# Patient Record
Sex: FEMALE | Race: WHITE | NOT HISPANIC OR LATINO | Employment: UNEMPLOYED | URBAN - NONMETROPOLITAN AREA
[De-identification: names, ages, dates, MRNs, and addresses within clinical notes are randomized per-mention and may not be internally consistent; named-entity substitution may affect disease eponyms.]

---
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
## 2020-01-27 ENCOUNTER — OFFICE VISIT (OUTPATIENT)
Dept: URGENT CARE | Facility: PHYSICIAN GROUP | Age: 20
End: 2020-01-27
Payer: MEDICAID

## 2020-01-27 VITALS
OXYGEN SATURATION: 98 % | BODY MASS INDEX: 21.69 KG/M2 | WEIGHT: 135 LBS | TEMPERATURE: 97.9 F | HEIGHT: 66 IN | RESPIRATION RATE: 16 BRPM | HEART RATE: 92 BPM | DIASTOLIC BLOOD PRESSURE: 80 MMHG | SYSTOLIC BLOOD PRESSURE: 118 MMHG

## 2020-01-27 DIAGNOSIS — J00 ACUTE NASOPHARYNGITIS: Primary | ICD-10-CM

## 2020-01-27 DIAGNOSIS — J02.9 SORE THROAT: ICD-10-CM

## 2020-01-27 LAB
INT CON NEG: NEGATIVE
INT CON POS: POSITIVE
S PYO AG THROAT QL: NEGATIVE

## 2020-01-27 PROCEDURE — 99214 OFFICE O/P EST MOD 30 MIN: CPT | Performed by: PHYSICIAN ASSISTANT

## 2020-01-27 PROCEDURE — 87880 STREP A ASSAY W/OPTIC: CPT | Performed by: PHYSICIAN ASSISTANT

## 2020-01-27 RX ORDER — SUMATRIPTAN 50 MG/1
TABLET, FILM COATED ORAL
COMMUNITY
Start: 2019-11-12 | End: 2020-11-28

## 2020-01-27 RX ORDER — FLUOXETINE HYDROCHLORIDE 20 MG/1
CAPSULE ORAL
COMMUNITY
Start: 2020-01-07 | End: 2020-08-31

## 2020-01-27 NOTE — PATIENT INSTRUCTIONS
"Upper Respiratory Infection, Adult  Most upper respiratory infections (URIs) are a viral infection of the air passages leading to the lungs. A URI affects the nose, throat, and upper air passages. The most common type of URI is nasopharyngitis and is typically referred to as \"the common cold.\"  URIs run their course and usually go away on their own. Most of the time, a URI does not require medical attention, but sometimes a bacterial infection in the upper airways can follow a viral infection. This is called a secondary infection. Sinus and middle ear infections are common types of secondary upper respiratory infections.  Bacterial pneumonia can also complicate a URI. A URI can worsen asthma and chronic obstructive pulmonary disease (COPD). Sometimes, these complications can require emergency medical care and may be life threatening.  What are the causes?  Almost all URIs are caused by viruses. A virus is a type of germ and can spread from one person to another.  What increases the risk?  You may be at risk for a URI if:  · You smoke.  · You have chronic heart or lung disease.  · You have a weakened defense (immune) system.  · You are very young or very old.  · You have nasal allergies or asthma.  · You work in crowded or poorly ventilated areas.  · You work in health care facilities or schools.  What are the signs or symptoms?  Symptoms typically develop 2-3 days after you come in contact with a cold virus. Most viral URIs last 7-10 days. However, viral URIs from the influenza virus (flu virus) can last 14-18 days and are typically more severe. Symptoms may include:  · Runny or stuffy (congested) nose.  · Sneezing.  · Cough.  · Sore throat.  · Headache.  · Fatigue.  · Fever.  · Loss of appetite.  · Pain in your forehead, behind your eyes, and over your cheekbones (sinus pain).  · Muscle aches.  How is this diagnosed?  Your health care provider may diagnose a URI by:  · Physical exam.  · Tests to check that your " symptoms are not due to another condition such as:  ¨ Strep throat.  ¨ Sinusitis.  ¨ Pneumonia.  ¨ Asthma.  How is this treated?  A URI goes away on its own with time. It cannot be cured with medicines, but medicines may be prescribed or recommended to relieve symptoms. Medicines may help:  · Reduce your fever.  · Reduce your cough.  · Relieve nasal congestion.  Follow these instructions at home:  · Take medicines only as directed by your health care provider.  · Gargle warm saltwater or take cough drops to comfort your throat as directed by your health care provider.  · Use a warm mist humidifier or inhale steam from a shower to increase air moisture. This may make it easier to breathe.  · Drink enough fluid to keep your urine clear or pale yellow.  · Eat soups and other clear broths and maintain good nutrition.  · Rest as needed.  · Return to work when your temperature has returned to normal or as your health care provider advises. You may need to stay home longer to avoid infecting others. You can also use a face mask and careful hand washing to prevent spread of the virus.  · Increase the usage of your inhaler if you have asthma.  · Do not use any tobacco products, including cigarettes, chewing tobacco, or electronic cigarettes. If you need help quitting, ask your health care provider.  How is this prevented?  The best way to protect yourself from getting a cold is to practice good hygiene.  · Avoid oral or hand contact with people with cold symptoms.  · Wash your hands often if contact occurs.  There is no clear evidence that vitamin C, vitamin E, echinacea, or exercise reduces the chance of developing a cold. However, it is always recommended to get plenty of rest, exercise, and practice good nutrition.  Contact a health care provider if:  · You are getting worse rather than better.  · Your symptoms are not controlled by medicine.  · You have chills.  · You have worsening shortness of breath.  · You have brown  or red mucus.  · You have yellow or brown nasal discharge.  · You have pain in your face, especially when you bend forward.  · You have a fever.  · You have swollen neck glands.  · You have pain while swallowing.  · You have white areas in the back of your throat.  Get help right away if:  · You have severe or persistent:  ¨ Headache.  ¨ Ear pain.  ¨ Sinus pain.  ¨ Chest pain.  · You have chronic lung disease and any of the following:  ¨ Wheezing.  ¨ Prolonged cough.  ¨ Coughing up blood.  ¨ A change in your usual mucus.  · You have a stiff neck.  · You have changes in your:  ¨ Vision.  ¨ Hearing.  ¨ Thinking.  ¨ Mood.  This information is not intended to replace advice given to you by your health care provider. Make sure you discuss any questions you have with your health care provider.  Document Released: 06/13/2002 Document Revised: 08/20/2017 Document Reviewed: 03/25/2015  [employer] Interactive Patient Education © 2017 [employer]

## 2020-01-27 NOTE — PROGRESS NOTES
Chief Complaint   Patient presents with   • Pharyngitis       HISTORY OF PRESENT ILLNESS: Patient is a 19 y.o. female who presents today because she has a 2-day history of sore throat, nasal congestion, bilateral ear pain.  She has not been taking any medications for her current symptoms.  Denies any fevers, chills, nausea, vomiting or diarrhea.    Patient Active Problem List    Diagnosis Date Noted   • Hospital discharge follow-up 10/01/2019   • Drug-induced sexual dysfunction (HCC) 04/29/2019   • Pain of right scapula 04/29/2019   • Myalgia of muscle of neck 01/17/2019   • Cyst of right ovary 06/27/2018   • Eczema 05/29/2018   • Mild intermittent asthma without complication 12/04/2017   • Chronic right-sided low back pain without sciatica 05/15/2017   • Mild episode of recurrent major depressive disorder (HCC) 12/30/2016       Allergies:Kiwi extract    Current Outpatient Medications Ordered in Epic   Medication Sig Dispense Refill   • SUMAtriptan (IMITREX) 50 MG Tab      • FLUoxetine (PROZAC) 20 MG Cap      • albuterol (PROAIR HFA) 108 (90 Base) MCG/ACT Aero Soln inhalation aerosol INHALE TWO PUFFS BY MOUTH EVERY 6 HOURS AS NEEDED FOR SHORTNESS OF BREATH 1 Inhaler 2   • levonorgestrel (MIRENA, 52 MG,) 20 MCG/24HR IUD 1 Each by Intrauterine route Once.       No current Epic-ordered facility-administered medications on file.        Past Medical History:   Diagnosis Date   • Asthma    • Migraine    • Shoulder pain 8/8/2014   • Urinary tract infection        Social History     Tobacco Use   • Smoking status: Current Some Day Smoker     Packs/day: 0.00     Types: Cigars   • Smokeless tobacco: Never Used   Substance Use Topics   • Alcohol use: No     Alcohol/week: 0.0 oz   • Drug use: No       Family Status   Relation Name Status   • Mo  Alive   • Fa  Alive   • MGMo  Alive   • MGFa  Alive   • Child  Alive     Family History   Problem Relation Age of Onset   • Anxiety disorder Mother    • Heart Disease Maternal  "Grandfather        ROS:  Review of Systems   Constitutional: Negative for fever, chills, weight loss and malaise/fatigue.   HENT: Positive for bilateral ear pain, nosebleeds, positive for congestion, sore throat and no neck pain.    Eyes: Negative for blurred vision.   Respiratory: Positive for mild cough, no sputum production, shortness of breath and wheezing.    Cardiovascular: Negative for chest pain, palpitations, orthopnea and leg swelling.   Gastrointestinal: Negative for heartburn, nausea, vomiting and abdominal pain.   Genitourinary: Negative for dysuria, urgency and frequency.     Exam:  /80 (BP Location: Left arm, Patient Position: Sitting, BP Cuff Size: Adult)   Pulse 92   Temp 36.6 °C (97.9 °F) (Temporal)   Resp 16   Ht 1.664 m (5' 5.5\")   Wt 61.2 kg (135 lb)   SpO2 98%   General:  Well nourished, well developed female in NAD  Head:Normocephalic, atraumatic  Eyes: PERRLA, EOM within normal limits, no conjunctival injection, no scleral icterus, visual fields and acuity grossly intact.  Ears: Normal shape and symmetry, no tenderness, no discharge. External canals are without any significant edema or erythema. Tympanic membranes are without any inflammation, no effusion. Gross auditory acuity is intact  Nose: Symmetrical without tenderness, no discharge.  Mouth: reasonable hygiene, she has mild pharyngeal arch erythema without exudates or tonsillar enlargement.  Neck: no masses, range of motion within normal limits, no tracheal deviation. No obvious thyroid enlargement.  Pulmonary: chest is symmetrical with respiration, no wheezes, crackles, or rhonchi.  Cardiovascular: regular rate and rhythm without murmurs, rubs, or gallops.  Extremities: no clubbing, cyanosis, or edema.    Strep test is negative    Please note that this dictation was created using voice recognition software. I have made every reasonable attempt to correct obvious errors, but I expect that there are errors of grammar and " possibly content that I did not discover before finalizing the note.    Assessment/Plan:  1. Acute nasopharyngitis     2. Sore throat  POCT Rapid Strep A   Over-the-counter DayQuil and NyQuil as tolerated.    Followup with primary care in the next 7-10 days if not significantly improving, return to the urgent care or go to the emergency room sooner for any worsening of symptoms.

## 2020-02-19 ENCOUNTER — OFFICE VISIT (OUTPATIENT)
Dept: URGENT CARE | Facility: PHYSICIAN GROUP | Age: 20
End: 2020-02-19
Payer: MEDICAID

## 2020-02-19 VITALS
TEMPERATURE: 98.3 F | BODY MASS INDEX: 21.69 KG/M2 | SYSTOLIC BLOOD PRESSURE: 102 MMHG | WEIGHT: 135 LBS | HEART RATE: 64 BPM | DIASTOLIC BLOOD PRESSURE: 58 MMHG | RESPIRATION RATE: 16 BRPM | OXYGEN SATURATION: 96 % | HEIGHT: 66 IN

## 2020-02-19 DIAGNOSIS — J06.9 UPPER RESPIRATORY TRACT INFECTION, UNSPECIFIED TYPE: ICD-10-CM

## 2020-02-19 PROCEDURE — 99214 OFFICE O/P EST MOD 30 MIN: CPT | Performed by: PHYSICIAN ASSISTANT

## 2020-02-19 RX ORDER — BENZONATATE 200 MG/1
200 CAPSULE ORAL 3 TIMES DAILY PRN
Qty: 30 CAP | Refills: 0 | Status: SHIPPED | OUTPATIENT
Start: 2020-02-19 | End: 2020-08-31

## 2020-02-19 NOTE — PROGRESS NOTES
Chief Complaint   Patient presents with   • Cough     Sore Throat, Cough, Fatigue.        HISTORY OF PRESENT ILLNESS: Patient is a 19 y.o. female who presents today for the following:    Cough x 3-4 days  + ST, tired, mild ear pain off and on  Denies nasal congestion  OTC meds tried: cough drop  Denies fever, chills    Patient Active Problem List    Diagnosis Date Noted   • Hospital discharge follow-up 10/01/2019   • Drug-induced sexual dysfunction (HCC) 04/29/2019   • Pain of right scapula 04/29/2019   • Myalgia of muscle of neck 01/17/2019   • Cyst of right ovary 06/27/2018   • Eczema 05/29/2018   • Mild intermittent asthma without complication 12/04/2017   • Chronic right-sided low back pain without sciatica 05/15/2017   • Mild episode of recurrent major depressive disorder (HCC) 12/30/2016       Allergies:Kiwi extract    Current Outpatient Medications Ordered in Epic   Medication Sig Dispense Refill   • benzonatate (TESSALON) 200 MG capsule Take 1 Cap by mouth 3 times a day as needed for Cough. 30 Cap 0   • SUMAtriptan (IMITREX) 50 MG Tab      • FLUoxetine (PROZAC) 20 MG Cap      • albuterol (PROAIR HFA) 108 (90 Base) MCG/ACT Aero Soln inhalation aerosol INHALE TWO PUFFS BY MOUTH EVERY 6 HOURS AS NEEDED FOR SHORTNESS OF BREATH 1 Inhaler 2   • levonorgestrel (MIRENA, 52 MG,) 20 MCG/24HR IUD 1 Each by Intrauterine route Once.       No current Epic-ordered facility-administered medications on file.        Past Medical History:   Diagnosis Date   • Asthma    • Migraine    • Shoulder pain 8/8/2014   • Urinary tract infection        Social History     Tobacco Use   • Smoking status: Current Some Day Smoker     Packs/day: 0.00     Types: Cigars   • Smokeless tobacco: Never Used   Substance Use Topics   • Alcohol use: No     Alcohol/week: 0.0 oz   • Drug use: No       Family Status   Relation Name Status   • Mo  Alive   • Fa  Alive   • MGMo  Alive   • MGFa  Alive   • Child  Alive     Family History   Problem Relation  "Age of Onset   • Anxiety disorder Mother    • Heart Disease Maternal Grandfather        Review of Systems:   Constitutional ROS: No unexpected change in weight, No weakness, No fatigue  Eye ROS: No recent significant change in vision, No eye pain, redness, discharge  Ear ROS: No drainage, No tinnitus or vertigo, No recent change in hearing  Mouth/Throat ROS: No teeth or gum problems, No bleeding gums, No tongue complaints  Neck ROS: No swollen glands, No significant pain in neck  Pulmonary ROS: No chronic cough, sputum, or hemoptysis, No dyspnea on exertion, No wheezing  Cardiovascular ROS: No diaphoresis, No edema, No palpitations  Musculoskeletal/Extremities ROS: No peripheral edema, No pain, redness or swelling on the joints  Hematologic/Lymphatic ROS: No chills, No night sweats, No weight loss  Skin/Integumentary ROS: No edema, No evidence of rash, No itching      Exam:  /58   Pulse 64   Temp 36.8 °C (98.3 °F) (Temporal)   Resp 16   Ht 1.676 m (5' 6\")   Wt 61.2 kg (135 lb)   SpO2 96%   General: Well developed, well nourished. No distress.    Eye: PERRL/EOMI; conjunctivae clear, lids normal.  ENMT: Lips without lesions, MMM. Oropharynx is clear. Bilateral TMs are within normal limits.  Pulmonary: Unlabored respiratory effort. Lungs clear to auscultation, no wheezes, no rhonchi.    Cardiovascular: Regular rate and rhythm without murmur.   Neurologic: Grossly nonfocal. No facial asymmetry noted.  Lymph: No cervical lymphadenopathy noted.  Skin: Warm, dry, good turgor. No rashes in visible areas.   Psych: Normal mood. Alert and oriented to person, place and time.    Assessment/Plan:  Discussed likely viral etiology.  Vitals and exam unremarkable.  Low suspicion for pneumonia.  Discussed appropriate over-the-counter symptomatic medication, and when to return to clinic. Follow up for worsening or persistent symptoms.  1. Upper respiratory tract infection, unspecified type  benzonatate (TESSALON) 200 MG " capsule

## 2020-08-31 ENCOUNTER — OFFICE VISIT (OUTPATIENT)
Dept: URGENT CARE | Facility: PHYSICIAN GROUP | Age: 20
End: 2020-08-31
Payer: MEDICAID

## 2020-08-31 VITALS
TEMPERATURE: 98 F | WEIGHT: 137 LBS | RESPIRATION RATE: 16 BRPM | DIASTOLIC BLOOD PRESSURE: 58 MMHG | HEART RATE: 78 BPM | OXYGEN SATURATION: 97 % | SYSTOLIC BLOOD PRESSURE: 98 MMHG | BODY MASS INDEX: 22.02 KG/M2 | HEIGHT: 66 IN

## 2020-08-31 DIAGNOSIS — R42 DIZZINESS: ICD-10-CM

## 2020-08-31 LAB
APPEARANCE UR: CLEAR
BILIRUB UR STRIP-MCNC: NORMAL MG/DL
COLOR UR AUTO: YELLOW
GLUCOSE BLD-MCNC: 96 MG/DL (ref 70–100)
GLUCOSE UR STRIP.AUTO-MCNC: NORMAL MG/DL
INT CON NEG: NORMAL
INT CON POS: NORMAL
KETONES UR STRIP.AUTO-MCNC: NORMAL MG/DL
LEUKOCYTE ESTERASE UR QL STRIP.AUTO: NORMAL
NITRITE UR QL STRIP.AUTO: NORMAL
PH UR STRIP.AUTO: 5.5 [PH] (ref 5–8)
POC URINE PREGNANCY TEST: NORMAL
PROT UR QL STRIP: NORMAL MG/DL
RBC UR QL AUTO: NORMAL
SP GR UR STRIP.AUTO: 1.02
UROBILINOGEN UR STRIP-MCNC: 0.2 MG/DL

## 2020-08-31 PROCEDURE — 99214 OFFICE O/P EST MOD 30 MIN: CPT | Mod: 25 | Performed by: PHYSICIAN ASSISTANT

## 2020-08-31 PROCEDURE — 81002 URINALYSIS NONAUTO W/O SCOPE: CPT | Performed by: PHYSICIAN ASSISTANT

## 2020-08-31 PROCEDURE — 81025 URINE PREGNANCY TEST: CPT | Performed by: PHYSICIAN ASSISTANT

## 2020-08-31 PROCEDURE — 82962 GLUCOSE BLOOD TEST: CPT | Performed by: PHYSICIAN ASSISTANT

## 2020-08-31 ASSESSMENT — FIBROSIS 4 INDEX: FIB4 SCORE: 0.34

## 2020-08-31 NOTE — PROGRESS NOTES
Chief Complaint   Patient presents with   • Dizziness     SOB/Dizziness/Heart Palpitations.        HISTORY OF PRESENT ILLNESS: Patient is a 20 y.o. female who presents today for the following:    Dizziness x 4 days  + SOB; h/o asthma; using inhaler almost daily  Denies fever, body aches, chills  Does not use a nebulizer at home  Mild nasal congestion  LMP: 3 years ago; IUD in place  Nausea with dizziness  OTC meds today: none  COVID contact: none  vapes nictotine  Denies EtOH, illicit drugs, marijuana    Patient Active Problem List    Diagnosis Date Noted   • Hospital discharge follow-up 10/01/2019   • Drug-induced sexual dysfunction (HCC) 04/29/2019   • Pain of right scapula 04/29/2019   • Myalgia of muscle of neck 01/17/2019   • Cyst of right ovary 06/27/2018   • Eczema 05/29/2018   • Mild intermittent asthma without complication 12/04/2017   • Chronic right-sided low back pain without sciatica 05/15/2017   • Mild episode of recurrent major depressive disorder (HCC) 12/30/2016       Allergies:Kiwi extract    Current Outpatient Medications Ordered in Epic   Medication Sig Dispense Refill   • SUMAtriptan (IMITREX) 50 MG Tab      • albuterol (PROAIR HFA) 108 (90 Base) MCG/ACT Aero Soln inhalation aerosol INHALE TWO PUFFS BY MOUTH EVERY 6 HOURS AS NEEDED FOR SHORTNESS OF BREATH 1 Inhaler 2   • levonorgestrel (MIRENA, 52 MG,) 20 MCG/24HR IUD 1 Each by Intrauterine route Once.       No current Epic-ordered facility-administered medications on file.        Past Medical History:   Diagnosis Date   • Asthma    • Migraine    • Shoulder pain 8/8/2014   • Urinary tract infection        Social History     Tobacco Use   • Smoking status: Current Some Day Smoker     Packs/day: 0.00     Types: Cigars   • Smokeless tobacco: Never Used   Substance Use Topics   • Alcohol use: No     Alcohol/week: 0.0 oz   • Drug use: No       Family Status   Relation Name Status   • Mo  Alive   • Fa  Alive   • MGMo  Alive   • MGFa  Alive   • Child   "Alive     Family History   Problem Relation Age of Onset   • Anxiety disorder Mother    • Heart Disease Maternal Grandfather        Review of Systems   Constitutional ROS: No unexpected change in weight, No weakness, No fatigue  Eye ROS: No recent significant change in vision, No eye pain, redness, discharge  Ear ROS: No drainage, No tinnitus or vertigo, No recent change in hearing  Mouth/Throat ROS: No teeth or gum problems, No bleeding gums, No tongue complaints  Neck ROS: No swollen glands, No significant pain in neck  Pulmonary ROS: No chronic cough, sputum, or hemoptysis, No dyspnea on exertion, No wheezing  Cardiovascular ROS: No diaphoresis, No edema, No palpitations  Musculoskeletal/Extremities ROS: No peripheral edema, No pain, redness or swelling on the joints  Hematologic/Lymphatic ROS: No chills, No night sweats, No weight loss  Skin/Integumentary ROS: No edema, No evidence of rash, No itching    Exam:  BP (!) 98/58   Pulse 78   Temp 36.7 °C (98 °F) (Temporal)   Resp 16   Ht 1.676 m (5' 6\")   Wt 62.1 kg (137 lb)   SpO2 97%   General: Well developed, well nourished. No distress.    Eye: PERRL/EOMI; conjunctivae clear, lids normal.  ENMT: Lips without lesions, MMM. Oropharynx is clear. Bilateral TMs are within normal limits.  Pulmonary: Unlabored respiratory effort. Lungs clear to auscultation, no wheezes, no rhonchi.    Cardiovascular: Regular rate and rhythm without murmur.   Neurologic: Grossly nonfocal. No facial asymmetry noted.  Lymph: No cervical lymphadenopathy noted.  Skin: Warm, dry, good turgor. No rashes in visible areas.   Psych: Normal mood. Alert and oriented to person, place and time.    Glucose: 96  UA: Trace blood  Urine hcg: negative    Assessment/Plan:  Discussed differential diagnosis with the patient including but not limited to viral etiology, low blood pressure, anemia, thyroid disease, among others.  Encourage patient increase her fluid intake.  Patient to have labs drawn and " follow up with her primary care provider for further evaluation management of persistent symptoms.   1. Dizziness  POCT Urinalysis    POCT PREGNANCY    POCT glucose

## 2020-09-14 ENCOUNTER — OFFICE VISIT (OUTPATIENT)
Dept: URGENT CARE | Facility: PHYSICIAN GROUP | Age: 20
End: 2020-09-14
Payer: MEDICAID

## 2020-09-14 ENCOUNTER — HOSPITAL ENCOUNTER (OUTPATIENT)
Facility: MEDICAL CENTER | Age: 20
End: 2020-09-14
Attending: NURSE PRACTITIONER
Payer: MEDICAID

## 2020-09-14 VITALS
TEMPERATURE: 98.4 F | HEIGHT: 67 IN | SYSTOLIC BLOOD PRESSURE: 104 MMHG | WEIGHT: 139.8 LBS | HEART RATE: 81 BPM | DIASTOLIC BLOOD PRESSURE: 66 MMHG | BODY MASS INDEX: 21.94 KG/M2 | OXYGEN SATURATION: 97 % | RESPIRATION RATE: 16 BRPM

## 2020-09-14 DIAGNOSIS — J45.21 EXACERBATION OF INTERMITTENT ASTHMA, UNSPECIFIED ASTHMA SEVERITY: ICD-10-CM

## 2020-09-14 DIAGNOSIS — J06.9 ACUTE URI: ICD-10-CM

## 2020-09-14 DIAGNOSIS — H10.9 BACTERIAL CONJUNCTIVITIS OF LEFT EYE: ICD-10-CM

## 2020-09-14 LAB — COVID ORDER STATUS COVID19: NORMAL

## 2020-09-14 PROCEDURE — 99000 SPECIMEN HANDLING OFFICE-LAB: CPT | Performed by: NURSE PRACTITIONER

## 2020-09-14 PROCEDURE — 99214 OFFICE O/P EST MOD 30 MIN: CPT | Performed by: NURSE PRACTITIONER

## 2020-09-14 PROCEDURE — U0003 INFECTIOUS AGENT DETECTION BY NUCLEIC ACID (DNA OR RNA); SEVERE ACUTE RESPIRATORY SYNDROME CORONAVIRUS 2 (SARS-COV-2) (CORONAVIRUS DISEASE [COVID-19]), AMPLIFIED PROBE TECHNIQUE, MAKING USE OF HIGH THROUGHPUT TECHNOLOGIES AS DESCRIBED BY CMS-2020-01-R: HCPCS

## 2020-09-14 RX ORDER — PREDNISONE 10 MG/1
TABLET ORAL
Qty: 15 TAB | Refills: 0 | Status: SHIPPED | OUTPATIENT
Start: 2020-09-14 | End: 2020-11-28

## 2020-09-14 RX ORDER — POLYMYXIN B SULFATE AND TRIMETHOPRIM 1; 10000 MG/ML; [USP'U]/ML
1 SOLUTION OPHTHALMIC EVERY 4 HOURS
Qty: 10 ML | Refills: 0 | Status: SHIPPED | OUTPATIENT
Start: 2020-09-14 | End: 2020-11-28

## 2020-09-14 RX ORDER — ALBUTEROL SULFATE 90 UG/1
2 AEROSOL, METERED RESPIRATORY (INHALATION) EVERY 6 HOURS PRN
Qty: 8.5 G | Refills: 1 | Status: SHIPPED | OUTPATIENT
Start: 2020-09-14 | End: 2022-04-27 | Stop reason: SDUPTHER

## 2020-09-14 RX ORDER — MOXIFLOXACIN 5 MG/ML
1 SOLUTION/ DROPS OPHTHALMIC 3 TIMES DAILY
Qty: 3 ML | Refills: 0 | Status: SHIPPED | OUTPATIENT
Start: 2020-09-14 | End: 2020-09-21

## 2020-09-14 ASSESSMENT — ENCOUNTER SYMPTOMS
EYE DISCHARGE: 1
CHILLS: 0
COUGH: 1
FEVER: 0

## 2020-09-14 ASSESSMENT — FIBROSIS 4 INDEX: FIB4 SCORE: 0.34

## 2020-09-14 NOTE — PROGRESS NOTES
Patient return to office with request for change in her eyedrops.  Vigamox initially prescribed was over $100.  Prescription for Polytrim ophthalmic drops sent to patient's pharmacy.

## 2020-09-14 NOTE — PROGRESS NOTES
Subjective:      Niki Riley is a 20 y.o. female who presents with Conjunctivitis and Nasal Congestion    Past Medical History:   Diagnosis Date   • Asthma    • Migraine    • Shoulder pain 8/8/2014   • Urinary tract infection      Social History     Socioeconomic History   • Marital status: Legally      Spouse name: Not on file   • Number of children: Not on file   • Years of education: Not on file   • Highest education level: Not on file   Occupational History   • Not on file   Social Needs   • Financial resource strain: Not on file   • Food insecurity     Worry: Not on file     Inability: Not on file   • Transportation needs     Medical: Not on file     Non-medical: Not on file   Tobacco Use   • Smoking status: Current Some Day Smoker     Packs/day: 0.00     Types: Cigars   • Smokeless tobacco: Never Used   • Tobacco comment: vapes   Substance and Sexual Activity   • Alcohol use: No     Alcohol/week: 0.0 oz   • Drug use: No   • Sexual activity: Yes     Partners: Male     Birth control/protection: I.U.D.   Lifestyle   • Physical activity     Days per week: Not on file     Minutes per session: Not on file   • Stress: Not on file   Relationships   • Social connections     Talks on phone: Not on file     Gets together: Not on file     Attends Taoist service: Not on file     Active member of club or organization: Not on file     Attends meetings of clubs or organizations: Not on file     Relationship status: Not on file   • Intimate partner violence     Fear of current or ex partner: Not on file     Emotionally abused: Not on file     Physically abused: Not on file     Forced sexual activity: Not on file   Other Topics Concern   • Behavioral problems Not Asked   • Interpersonal relationships Not Asked   • Sad or not enjoying activities Not Asked   • Suicidal thoughts Not Asked   • Poor school performance Not Asked   • Reading difficulties Not Asked   • Speech difficulties Not Asked   • Writing  "difficulties Not Asked   • Inadequate sleep Not Asked   • Excessive TV viewing Not Asked   • Excessive video game use Not Asked   • Inadequate exercise Not Asked   • Sports related Not Asked   • Poor diet Not Asked   • Family concerns for drug/alcohol abuse Not Asked   • Poor oral hygiene Not Asked   • Bike safety Not Asked   • Family concerns vehicle safety Not Asked   Social History Narrative   • Not on file     Family History   Problem Relation Age of Onset   • Anxiety disorder Mother    • Heart Disease Maternal Grandfather        Allergies: Kiwi extract    Patient is a 20-year-old female who presents today with complaint of general fatigue and malaise with nasal congestion and cough.  Also was having tightness in her chest and intermittent wheezing.  Positive prior history of asthma.  States she is out of her inhaler at this time.  She has also had redness and swelling to the left eye with injection of the conjunctiva and yellow thick drainage.  Symptoms started with this over the last 24 hours.  No fevers, no chills.          Conjunctivitis  This is a new problem. The current episode started in the past 7 days. The problem occurs constantly. The problem has been unchanged. Associated symptoms include congestion and coughing. Pertinent negatives include no chills or fever. Associated symptoms comments: Conjunctivitis  . Nothing aggravates the symptoms. She has tried nothing for the symptoms. The treatment provided no relief.       Review of Systems   Constitutional: Positive for malaise/fatigue. Negative for chills and fever.   HENT: Positive for congestion.    Eyes: Positive for discharge.        Patient states she does not wear contact lenses   Respiratory: Positive for cough.    Skin: Negative.    All other systems reviewed and are negative.         Objective:     /66   Pulse 81   Temp 36.9 °C (98.4 °F) (Temporal)   Resp 16   Ht 1.702 m (5' 7\")   Wt 63.4 kg (139 lb 12.8 oz)   SpO2 97%   BMI 21.90 " kg/m²      Physical Exam  Vitals signs reviewed.   Constitutional:       Appearance: Normal appearance.   HENT:      Head: Normocephalic and atraumatic.      Right Ear: Tympanic membrane, ear canal and external ear normal.      Left Ear: Tympanic membrane, ear canal and external ear normal.      Nose: Nose normal.      Mouth/Throat:      Mouth: Mucous membranes are moist.   Eyes:      General:         Right eye: No discharge.         Left eye: Discharge present.     Extraocular Movements: Extraocular movements intact.      Pupils: Pupils are equal, round, and reactive to light.      Comments: Conjunctiva of the left eye red and injected.  There is thick yellow purulent drainage present.   Neck:      Musculoskeletal: Normal range of motion and neck supple.   Cardiovascular:      Rate and Rhythm: Normal rate and regular rhythm.      Heart sounds: Normal heart sounds.   Pulmonary:      Effort: Pulmonary effort is normal.      Comments: Breath sounds are slightly harsh at this time  Musculoskeletal: Normal range of motion.   Skin:     General: Skin is warm and dry.   Neurological:      Mental Status: She is alert and oriented to person, place, and time.   Psychiatric:         Mood and Affect: Mood normal.         Behavior: Behavior normal.         Thought Content: Thought content normal.         Judgment: Judgment normal.                 Assessment/Plan:        1. Acute URI  2.  Exacerbation of asthma  3.  Bacterial conjunctivitis of the left eye    Vigamox drops  Prednisone  Refill albuterol inhaler  COVID nasal swab obtained; will call results  Strict ER precautions for respiratory distress

## 2020-09-15 LAB
SARS-COV-2 RNA RESP QL NAA+PROBE: NOTDETECTED
SPECIMEN SOURCE: NORMAL

## 2020-09-16 ENCOUNTER — TELEPHONE (OUTPATIENT)
Dept: URGENT CARE | Facility: CLINIC | Age: 20
End: 2020-09-16

## 2020-11-28 ENCOUNTER — OFFICE VISIT (OUTPATIENT)
Dept: URGENT CARE | Facility: PHYSICIAN GROUP | Age: 20
End: 2020-11-28
Payer: MEDICAID

## 2020-11-28 VITALS
BODY MASS INDEX: 20.89 KG/M2 | OXYGEN SATURATION: 97 % | SYSTOLIC BLOOD PRESSURE: 122 MMHG | RESPIRATION RATE: 16 BRPM | DIASTOLIC BLOOD PRESSURE: 70 MMHG | HEIGHT: 66 IN | HEART RATE: 98 BPM | WEIGHT: 130 LBS | TEMPERATURE: 98.7 F

## 2020-11-28 DIAGNOSIS — S31.135A INFECTED PIERCED UMBILICUS: ICD-10-CM

## 2020-11-28 DIAGNOSIS — L08.9 INFECTED PIERCED UMBILICUS: ICD-10-CM

## 2020-11-28 PROCEDURE — 99214 OFFICE O/P EST MOD 30 MIN: CPT | Performed by: FAMILY MEDICINE

## 2020-11-28 RX ORDER — SULFAMETHOXAZOLE AND TRIMETHOPRIM 800; 160 MG/1; MG/1
TABLET ORAL
Qty: 20 TAB | Refills: 0 | Status: SHIPPED | OUTPATIENT
Start: 2020-11-28 | End: 2020-12-03

## 2020-11-28 ASSESSMENT — PAIN SCALES - GENERAL: PAINLEVEL: 7=MODERATE-SEVERE PAIN

## 2020-11-28 ASSESSMENT — FIBROSIS 4 INDEX: FIB4 SCORE: 0.34

## 2020-11-28 NOTE — PROGRESS NOTES
Chief Complaint:    Chief Complaint   Patient presents with   • Pain     infected belly button piercing, blister on inside of bottom part, leaking puss, red, and hot. nausea with pain, x2-3 weeks.        History of Present Illness:    This is a new problem. She got a new umbilical piercing and the skin surrounding it has been red, tender, and at times draining pus for the past 2-3 weeks, not getting better. She also feels she might be allergic to the piercing material.      Review of Systems:    Constitutional: Negative for fever, chills, and diaphoresis.   Eyes: Negative for change in vision, photophobia, pain, redness, and discharge.  ENT: Negative for ear pain, ear discharge, hearing loss, tinnitus, nasal congestion, nosebleeds, and sore throat.    Respiratory: Negative for cough, hemoptysis, sputum production, shortness of breath, wheezing, and stridor.    Cardiovascular: Negative for chest pain, palpitations, orthopnea, claudication, leg swelling, and PND.   Gastrointestinal: See HPI.   Genitourinary: Negative for dysuria, urinary urgency, urinary frequency, hematuria, and flank pain.   Musculoskeletal: Negative for myalgias, joint pain, neck pain, and back pain.   Skin: See HPI.  Neurological: Negative for dizziness, tingling, tremors, sensory change, speech change, focal weakness, seizures, and loss of consciousness.   Endo: Negative for polydipsia.   Heme: Does not bruise/bleed easily.   Psychiatric/Behavioral: Negative for depression, suicidal ideas, hallucinations, memory loss and substance abuse. The patient is not nervous/anxious and does not have insomnia.        Past Medical History:    Past Medical History:   Diagnosis Date   • Asthma    • Migraine    • Shoulder pain 8/8/2014   • Urinary tract infection      Past Surgical History:    Past Surgical History:   Procedure Laterality Date   • PELVISCOPY  9/5/2018    Procedure: PELVISCOPY-  OVARIAN CYSTECTOMY;  Surgeon: Kathleen Chen M.D.;  Location:  SURGERY SAME DAY [facility] ORS;  Service: Gynecology   • GASTROSCOPY  5/24/2014    Performed by Luis Felipe Decker M.D. at SURGERY [facility] ORS     Social History:    Social History     Socioeconomic History   • Marital status: Legally      Spouse name: Not on file   • Number of children: Not on file   • Years of education: Not on file   • Highest education level: Not on file   Occupational History   • Not on file   Social Needs   • Financial resource strain: Not on file   • Food insecurity     Worry: Not on file     Inability: Not on file   • Transportation needs     Medical: Not on file     Non-medical: Not on file   Tobacco Use   • Smoking status: Current Some Day Smoker     Packs/day: 0.00     Types: Cigars   • Smokeless tobacco: Never Used   • Tobacco comment: vapes   Substance and Sexual Activity   • Alcohol use: No     Alcohol/week: 0.0 oz   • Drug use: No   • Sexual activity: Yes     Partners: Male     Birth control/protection: I.U.D.   Lifestyle   • Physical activity     Days per week: Not on file     Minutes per session: Not on file   • Stress: Not on file   Relationships   • Social connections     Talks on phone: Not on file     Gets together: Not on file     Attends Protestant service: Not on file     Active member of club or organization: Not on file     Attends meetings of clubs or organizations: Not on file     Relationship status: Not on file   • Intimate partner violence     Fear of current or ex partner: Not on file     Emotionally abused: Not on file     Physically abused: Not on file     Forced sexual activity: Not on file   Other Topics Concern   • Behavioral problems Not Asked   • Interpersonal relationships Not Asked   • Sad or not enjoying activities Not Asked   • Suicidal thoughts Not Asked   • Poor school performance Not Asked   • Reading difficulties Not Asked   • Speech difficulties Not Asked   • Writing difficulties Not Asked   • Inadequate sleep Not Asked   • Excessive TV viewing  "Not Asked   • Excessive video game use Not Asked   • Inadequate exercise Not Asked   • Sports related Not Asked   • Poor diet Not Asked   • Family concerns for drug/alcohol abuse Not Asked   • Poor oral hygiene Not Asked   • Bike safety Not Asked   • Family concerns vehicle safety Not Asked   Social History Narrative   • Not on file     Family History:    Family History   Problem Relation Age of Onset   • Anxiety disorder Mother    • Heart Disease Maternal Grandfather      Medications:    Current Outpatient Medications on File Prior to Visit   Medication Sig Dispense Refill   • albuterol 108 (90 Base) MCG/ACT Aero Soln inhalation aerosol Inhale 2 Puffs by mouth every 6 hours as needed for Shortness of Breath. 8.5 g 1   • levonorgestrel (MIRENA, 52 MG,) 20 MCG/24HR IUD 1 Each by Intrauterine route Once.       No current facility-administered medications on file prior to visit.      Allergies:    Allergies   Allergen Reactions   • Kiwi Extract Anaphylaxis     Clarified by dietary staff       Vitals:    Vitals:    11/28/20 1031   BP: 122/70   Pulse: 98   Resp: 16   Temp: 37.1 °C (98.7 °F)   TempSrc: Temporal   SpO2: 97%   Weight: 59 kg (130 lb)   Height: 1.676 m (5' 6\")       Physical Exam:    Constitutional: Vital signs reviewed. Appears well-developed and well-nourished. No acute distress.   Eyes: Sclera white, conjunctivae clear.   ENT: External ears normal. Hearing normal.   Neck: Neck supple.   Pulmonary/Chest: Respirations non-labored.   Abdomen: Bowel sounds are normal active. Soft, non-distended.  Musculoskeletal: Normal gait. Normal range of motion. No muscular atrophy or weakness.  Neurological: Alert and oriented to person, place, and time. Muscle tone normal. Coordination normal.   Skin: Umbilical piercing present. Skin surrounding the piercing is erythematous and tender to palpation.  Psychiatric: Normal mood and affect. Behavior is normal. Judgment and thought content normal.       Assessment / " Plan:    1. Infected pierced umbilicus  - sulfamethoxazole-trimethoprim (BACTRIM DS) 800-160 MG tablet; 1 TAB BY MOUTH TWICE A DAY X 10 DAYS.  Dispense: 20 Tab; Refill: 0      Discussed with her DDX, management options, and risks, benefits, and alternatives to treatment plan agreed upon.    She will remove current piercing and replace with piercing material that she is not allergic to.    Agreeable to medication prescribed.    Discussed expected course of duration, time for improvement, and to seek follow-up in Emergency Room, urgent care, or with PCP if getting worse at any time or not improving within expected time frame.

## 2020-12-03 ENCOUNTER — OFFICE VISIT (OUTPATIENT)
Dept: URGENT CARE | Facility: PHYSICIAN GROUP | Age: 20
End: 2020-12-03
Payer: MEDICAID

## 2020-12-03 VITALS
BODY MASS INDEX: 21.02 KG/M2 | SYSTOLIC BLOOD PRESSURE: 98 MMHG | HEIGHT: 66 IN | RESPIRATION RATE: 16 BRPM | OXYGEN SATURATION: 100 % | HEART RATE: 87 BPM | DIASTOLIC BLOOD PRESSURE: 58 MMHG | TEMPERATURE: 97.9 F | WEIGHT: 130.8 LBS

## 2020-12-03 DIAGNOSIS — T50.905A MEDICATION REACTION, INITIAL ENCOUNTER: ICD-10-CM

## 2020-12-03 DIAGNOSIS — L08.9 INFECTED PIERCED UMBILICUS: Primary | ICD-10-CM

## 2020-12-03 DIAGNOSIS — S31.135A INFECTED PIERCED UMBILICUS: Primary | ICD-10-CM

## 2020-12-03 PROCEDURE — 99214 OFFICE O/P EST MOD 30 MIN: CPT | Performed by: PHYSICIAN ASSISTANT

## 2020-12-03 RX ORDER — CLINDAMYCIN HYDROCHLORIDE 300 MG/1
300 CAPSULE ORAL 3 TIMES DAILY
Qty: 21 CAP | Refills: 0 | Status: SHIPPED | OUTPATIENT
Start: 2020-12-03 | End: 2020-12-10

## 2020-12-03 ASSESSMENT — FIBROSIS 4 INDEX: FIB4 SCORE: 0.34

## 2020-12-05 NOTE — PATIENT INSTRUCTIONS
Cellulitis, Adult    Cellulitis is a skin infection. The infected area is often warm, red, swollen, and sore. It occurs most often in the arms and lower legs. It is very important to get treated for this condition.  What are the causes?  This condition is caused by bacteria. The bacteria enter through a break in the skin, such as a cut, burn, insect bite, open sore, or crack.  What increases the risk?  This condition is more likely to occur in people who:  · Have a weak body defense system (immune system).  · Have open cuts, burns, bites, or scrapes on the skin.  · Are older than 60 years of age.  · Have a blood sugar problem (diabetes).  · Have a long-lasting (chronic) liver disease (cirrhosis) or kidney disease.  · Are very overweight (obese).  · Have a skin problem, such as:  ? Itchy rash (eczema).  ? Slow movement of blood in the veins (venous stasis).  ? Fluid buildup below the skin (edema).  · Have been treated with high-energy rays (radiation).  · Use IV drugs.  What are the signs or symptoms?  Symptoms of this condition include:  · Skin that is:  ? Red.  ? Streaking.  ? Spotting.  ? Swollen.  ? Sore or painful when you touch it.  ? Warm.  · A fever.  · Chills.  · Blisters.  How is this diagnosed?  This condition is diagnosed based on:  · Medical history.  · Physical exam.  · Blood tests.  · Imaging tests.  How is this treated?  Treatment for this condition may include:  · Medicines to treat infections or allergies.  · Home care, such as:  ? Rest.  ? Placing cold or warm cloths (compresses) on the skin.  · Hospital care, if the condition is very bad.  Follow these instructions at home:  Medicines  · Take over-the-counter and prescription medicines only as told by your doctor.  · If you were prescribed an antibiotic medicine, take it as told by your doctor. Do not stop taking it even if you start to feel better.  General instructions    · Drink enough fluid to keep your pee (urine) pale yellow.  · Do not touch  or rub the infected area.  · Raise (elevate) the infected area above the level of your heart while you are sitting or lying down.  · Place cold or warm cloths on the area as told by your doctor.  · Keep all follow-up visits as told by your doctor. This is important.  Contact a doctor if:  · You have a fever.  · You do not start to get better after 1-2 days of treatment.  · Your bone or joint under the infected area starts to hurt after the skin has healed.  · Your infection comes back. This can happen in the same area or another area.  · You have a swollen bump in the area.  · You have new symptoms.  · You feel ill and have muscle aches and pains.  Get help right away if:  · Your symptoms get worse.  · You feel very sleepy.  · You throw up (vomit) or have watery poop (diarrhea) for a long time.  · You see red streaks coming from the area.  · Your red area gets larger.  · Your red area turns dark in color.  These symptoms may represent a serious problem that is an emergency. Do not wait to see if the symptoms will go away. Get medical help right away. Call your local emergency services (911 in the [place] Do not drive yourself to the hospital.  Summary  · Cellulitis is a skin infection. The area is often warm, red, swollen, and sore.  · This condition is treated with medicines, rest, and cold and warm cloths.  · Take all medicines only as told by your doctor.  · Tell your doctor if symptoms do not start to get better after 1-2 days of treatment.  This information is not intended to replace advice given to you by your health care provider. Make sure you discuss any questions you have with your health care provider.  Document Released: 06/05/2009 Document Revised: 05/09/2019 Document Reviewed: 05/09/2019  Elsevier Patient Education © 2020 [employer]

## 2020-12-05 NOTE — PROGRESS NOTES
Subjective:      Pt is a 20 y.o. female who presents with Medication Reaction (bad stomach aches, nausea and vomiting )            HPI  This is a new problem. Pt was recently seen in the  5 days ago and placed on Bactrim for infected umbilical piercing x 7 days and notes she became nausea with vomiting and bad stomachaches after taking the prescribed anabiotic. Pt is here for a new antibiotic and re-check of her infection. Pt has not taken any Rx medications for this condition. Pt states the pain is a 4/10, aching in nature and worse at night. Pt denies CP, SOB,  paresthesias, headaches, dizziness, change in vision, hives, or other joint pain. The pt's medication list, problem list, and allergies have been evaluated and reviewed during today's visit.    PMH:  Past Medical History:   Diagnosis Date   • Asthma    • Migraine    • Shoulder pain 8/8/2014   • Urinary tract infection        PSH:  Past Surgical History:   Procedure Laterality Date   • PELVISCOPY  9/5/2018    Procedure: PELVISCOPY-  OVARIAN CYSTECTOMY;  Surgeon: Kathleen Chen M.D.;  Location: SURGERY SAME DAY [facility];  Service: Gynecology   • GASTROSCOPY  5/24/2014    Performed by Luis Felipe Decker M.D. at SURGERY [facility]       Fam Hx:    family history includes Anxiety disorder in her mother; Heart Disease in her maternal grandfather.  Family Status   Relation Name Status   • Mo  Alive   • Fa  Alive   • MGMo  Alive   • MGFa  Alive   • Child  Alive       Soc HX:  Single, nonsmoker    Medications:    Current Outpatient Medications:   •  clindamycin (CLEOCIN) 300 MG Cap, Take 1 Cap by mouth 3 times a day for 7 days., Disp: 21 Cap, Rfl: 0  •  albuterol 108 (90 Base) MCG/ACT Aero Soln inhalation aerosol, Inhale 2 Puffs by mouth every 6 hours as needed for Shortness of Breath., Disp: 8.5 g, Rfl: 1  •  levonorgestrel (MIRENA, 52 MG,) 20 MCG/24HR IUD, 1 Each by Intrauterine route Once., Disp: , Rfl:       Allergies:  Sulfa drugs, Honey dew, and  "Kiwi extract    ROS  Constitutional: Negative for fever, chills and malaise/fatigue.   HENT: Negative for congestion and sore throat.    Eyes: Negative for blurred vision, double vision and photophobia.   Respiratory: Negative for cough and shortness of breath.  Cardiovascular: Negative for chest pain and palpitations.   Gastrointestinal: Negative for heartburn, nausea, vomiting, abdominal pain, diarrhea and constipation.   Genitourinary: Negative for dysuria and flank pain.   Musculoskeletal: Negative for joint pain and myalgias.   Skin: + cellulitis of umbilical region  Neurological: Negative for dizziness, tingling and headaches.   Endo/Heme/Allergies: Does not bruise/bleed easily.   Psychiatric/Behavioral: Negative for depression. The patient is not nervous/anxious.           Objective:     BP (!) 98/58   Pulse 87   Temp 36.6 °C (97.9 °F) (Temporal)   Resp 16   Ht 1.676 m (5' 6\")   Wt 59.3 kg (130 lb 12.8 oz)   SpO2 100%   BMI 21.11 kg/m²      Physical Exam      Constitutional: PT is oriented to person, place, and time. PT appears well-developed and well-nourished. No distress.   HENT:   Head: Normocephalic and atraumatic.   Mouth/Throat: Oropharynx is clear and moist. No oropharyngeal exudate.   Eyes: Conjunctivae normal and EOM are normal. Pupils are equal, round, and reactive to light.   Neck: Normal range of motion. Neck supple. No thyromegaly present.   Cardiovascular: Normal rate, regular rhythm, normal heart sounds and intact distal pulses.  Exam reveals no gallop and no friction rub.    No murmur heard.  Pulmonary/Chest: Effort normal and breath sounds normal. No respiratory distress. PT has no wheezes. PT has no rales. Pt exhibits no tenderness.   Abdominal: Soft. Bowel sounds are normal. PT exhibits no distension and no mass. There is umbilical tenderness. There is no rebound and no guarding. +noted umbilical piercing in place with signs of erythema, edema and peeling skin in nature of both " contact dermatitis and cellulitis  Musculoskeletal: Normal range of motion. PT exhibits no edema and no tenderness.   Neurological: PT is alert and oriented to person, place, and time. PT has normal reflexes. No cranial nerve deficit.   Skin: Skin is warm and dry. No rash noted. PT is not diaphoretic. No erythema.       Psychiatric: PT has a normal mood and affect. PT behavior is normal. Judgment and thought content normal.          Assessment/Plan:        1. Infected pierced umbilicus    - clindamycin (CLEOCIN) 300 MG Cap; Take 1 Cap by mouth 3 times a day for 7 days.  Dispense: 21 Cap; Refill: 0    2. Medication reaction, initial encounter      Chart updated with Bactrim allergy  Rest, fluids encouraged.  AVS with medical info given.  Pt was in full understanding and agreement with the plan.  Differential diagnosis, natural history, supportive care, and indications for immediate follow-up discussed. All questions answered. Patient agrees with the plan of care.  Follow-up as needed if symptoms worsen or fail to improve to PCP, Urgent care or Emergency Room.

## 2020-12-26 ENCOUNTER — APPOINTMENT (OUTPATIENT)
Dept: RADIOLOGY | Facility: IMAGING CENTER | Age: 20
End: 2020-12-26
Attending: NURSE PRACTITIONER
Payer: MEDICAID

## 2020-12-26 ENCOUNTER — OFFICE VISIT (OUTPATIENT)
Dept: URGENT CARE | Facility: PHYSICIAN GROUP | Age: 20
End: 2020-12-26
Payer: MEDICAID

## 2020-12-26 VITALS
WEIGHT: 132.5 LBS | BODY MASS INDEX: 21.29 KG/M2 | OXYGEN SATURATION: 99 % | DIASTOLIC BLOOD PRESSURE: 62 MMHG | TEMPERATURE: 97.9 F | HEIGHT: 66 IN | SYSTOLIC BLOOD PRESSURE: 96 MMHG | HEART RATE: 106 BPM | RESPIRATION RATE: 16 BRPM

## 2020-12-26 DIAGNOSIS — S86.912A KNEE STRAIN, LEFT, INITIAL ENCOUNTER: ICD-10-CM

## 2020-12-26 DIAGNOSIS — M25.562 ACUTE PAIN OF LEFT KNEE: ICD-10-CM

## 2020-12-26 PROCEDURE — 99213 OFFICE O/P EST LOW 20 MIN: CPT | Performed by: NURSE PRACTITIONER

## 2020-12-26 PROCEDURE — 73564 X-RAY EXAM KNEE 4 OR MORE: CPT | Mod: TC,FY,LT | Performed by: NURSE PRACTITIONER

## 2020-12-26 ASSESSMENT — FIBROSIS 4 INDEX: FIB4 SCORE: 0.34

## 2020-12-26 NOTE — PROGRESS NOTES
Chief Complaint   Patient presents with   • Knee Pain     pain after waking in the morning, started yesterday. limited movement with brace on.        HISTORY OF PRESENT ILLNESS: Patient is a 20 y.o. female who presents to urgent care today with concerns of left knee pain.  She first noticed pain to her knee upon awakening yesterday morning and has had gradual worsening of pain since.   Pain exacerbated with climbing, walking, pressure.  She has tried a brace and ibuprofen.  She otherwise feels well denies any fever, chills, malaise.  Denies previous injuries to this knee. She has been walking up stairs more recently, otherwise cannot recall injury or trauma.     Patient Active Problem List    Diagnosis Date Noted   • Hospital discharge follow-up 10/01/2019   • Drug-induced sexual dysfunction (HCC) 04/29/2019   • Pain of right scapula 04/29/2019   • Myalgia of muscle of neck 01/17/2019   • Cyst of right ovary 06/27/2018   • Eczema 05/29/2018   • Mild intermittent asthma without complication 12/04/2017   • Chronic right-sided low back pain without sciatica 05/15/2017   • Mild episode of recurrent major depressive disorder (HCC) 12/30/2016       Allergies:Sulfa drugs, Honey dew, and Kiwi extract    Current Outpatient Medications Ordered in Epic   Medication Sig Dispense Refill   • albuterol 108 (90 Base) MCG/ACT Aero Soln inhalation aerosol Inhale 2 Puffs by mouth every 6 hours as needed for Shortness of Breath. 8.5 g 1   • levonorgestrel (MIRENA, 52 MG,) 20 MCG/24HR IUD 1 Each by Intrauterine route Once.       No current Epic-ordered facility-administered medications on file.        Past Medical History:   Diagnosis Date   • Asthma    • Migraine    • Shoulder pain 8/8/2014   • Urinary tract infection        Social History     Tobacco Use   • Smoking status: Current Some Day Smoker     Packs/day: 0.00     Types: Cigars   • Smokeless tobacco: Never Used   • Tobacco comment: vapes   Substance Use Topics   • Alcohol use:  "No     Alcohol/week: 0.0 oz   • Drug use: No       Family Status   Relation Name Status   • Mo  Alive   • Fa  Alive   • MGMo  Alive   • MGFa  Alive   • Child  Alive     Family History   Problem Relation Age of Onset   • Anxiety disorder Mother    • Heart Disease Maternal Grandfather        ROS:  Review of Systems   Constitutional: Negative for fever, chills, weight loss, malaise, and fatigue.   HENT: Negative for ear pain, nosebleeds, congestion, sore throat and neck pain.    Eyes: Negative for vision changes.   Neuro: Negative for headache, sensory changes, weakness, seizure, LOC.   Cardiovascular: Negative for chest pain, palpitations, orthopnea and leg swelling.   Respiratory: Negative for cough, sputum production, shortness of breath and wheezing.   Gastrointestinal: Negative for abdominal pain, nausea, vomiting or diarrhea.   Genitourinary: Negative for dysuria, urgency and frequency.  Musculoskeletal: Positive for left knee pain.  Negative for falls, neck pain, back pain, myalgias.   Skin: Negative for rash, diaphoresis.     Exam:  BP (!) 96/62   Pulse (!) 106   Temp 36.6 °C (97.9 °F) (Temporal)   Resp 16   Ht 1.676 m (5' 6\")   Wt 60.1 kg (132 lb 8 oz)   SpO2 99%   General: well-nourished, well-developed female in NAD  Head: normocephalic, atraumatic  Eyes: PERRLA, no conjunctival injection, acuity grossly intact, lids normal.  Ears: normal shape and symmetry, no tenderness, no discharge. External canals are without any significant edema or erythema. Tympanic membranes are without any inflammation, no effusion. Gross auditory acuity is intact.  Nose: symmetrical without tenderness, no discharge.  Mouth/Throat: reasonable hygiene, no erythema, exudates or tonsillar enlargement.  Neck: no masses, range of motion within normal limits, no tracheal deviation. No obvious thyroid enlargement.   Lymph: no cervical adenopathy. No supraclavicular adenopathy.   Neuro: alert and oriented. Cranial nerves 1-12 grossly " "intact. No sensory deficit.   Cardiovascular: regular rate (auscultated 95), regular rhythm. No edema.  Pulmonary: no distress. Chest is symmetrical with respiration, no wheezes, crackles, or rhonchi.   Musculoskeletal: no clubbing, appropriate muscle tone, gait is antalgic.  Left knee: Normal in appearance, full range of motion, no obvious laxity, soft tissue tenderness to lateral aspects of knee and quadricep tendon.  Neurovascular is intact, no erythema or swelling.  Skin: warm, dry, intact, no clubbing, no cyanosis, no rashes.   Psych: appropriate mood, affect, judgement.         DX left knee radiology reading \"No evidence of acute fracture or dislocation.\"      Assessment/Plan:  1. Knee strain, left, initial encounter  DX-KNEE COMPLETE 4+ LEFT    REFERRAL TO SPORTS MEDICINE         Patient presents with left knee pain, x-ray negative, suspect strain.  Placed in a knee brace, RICE.  OTC Tylenol and ibuprofen.  Referral to sports medicine is placed for follow-up.  Supportive care, differential diagnoses, and indications for immediate follow-up discussed with patient.   Pathogenesis of diagnosis discussed including typical length and natural progression.   Instructed to return to clinic or nearest emergency department for any change in condition, further concerns, or worsening of symptoms.  Patient states understanding of the plan of care and discharge instructions.  Instructed to make an appointment, for follow up, with her primary care provider.        Please note that this dictation was created using voice recognition software. I have made every reasonable attempt to correct obvious errors, but I expect that there are errors of grammar and possibly content that I did not discover before finalizing the note.      ALANA Ribeiro.     "

## 2020-12-30 ENCOUNTER — APPOINTMENT (OUTPATIENT)
Dept: RADIOLOGY | Facility: IMAGING CENTER | Age: 20
End: 2020-12-30
Attending: PHYSICIAN ASSISTANT
Payer: MEDICAID

## 2020-12-30 ENCOUNTER — OFFICE VISIT (OUTPATIENT)
Dept: URGENT CARE | Facility: PHYSICIAN GROUP | Age: 20
End: 2020-12-30
Payer: MEDICAID

## 2020-12-30 VITALS
TEMPERATURE: 97.5 F | SYSTOLIC BLOOD PRESSURE: 102 MMHG | RESPIRATION RATE: 12 BRPM | WEIGHT: 131 LBS | HEART RATE: 108 BPM | OXYGEN SATURATION: 99 % | DIASTOLIC BLOOD PRESSURE: 70 MMHG | BODY MASS INDEX: 21.05 KG/M2 | HEIGHT: 66 IN

## 2020-12-30 DIAGNOSIS — R00.2 PALPITATIONS: ICD-10-CM

## 2020-12-30 DIAGNOSIS — R07.9 CHEST PAIN, UNSPECIFIED TYPE: ICD-10-CM

## 2020-12-30 PROCEDURE — 99214 OFFICE O/P EST MOD 30 MIN: CPT | Performed by: PHYSICIAN ASSISTANT

## 2020-12-30 PROCEDURE — 71046 X-RAY EXAM CHEST 2 VIEWS: CPT | Mod: TC,FY | Performed by: PHYSICIAN ASSISTANT

## 2020-12-30 PROCEDURE — 93000 ELECTROCARDIOGRAM COMPLETE: CPT | Performed by: PHYSICIAN ASSISTANT

## 2020-12-30 ASSESSMENT — ENCOUNTER SYMPTOMS
COUGH: 0
NECK PAIN: 1
VOMITING: 0
TINGLING: 0
SYNCOPE: 0
DIARRHEA: 0
DIAPHORESIS: 0
HEADACHES: 0
CHILLS: 0
IRREGULAR HEARTBEAT: 0
WEAKNESS: 0
FOCAL WEAKNESS: 0
LOWER EXTREMITY EDEMA: 0
SHORTNESS OF BREATH: 1
MYALGIAS: 1
NEAR-SYNCOPE: 0
NAUSEA: 0
HEMOPTYSIS: 0
DIZZINESS: 1
NERVOUS/ANXIOUS: 1
BACK PAIN: 0
EXERTIONAL CHEST PRESSURE: 0
WHEEZING: 0
SPUTUM PRODUCTION: 0
ABDOMINAL PAIN: 0
PALPITATIONS: 1
FEVER: 0
SENSORY CHANGE: 0

## 2020-12-30 ASSESSMENT — FIBROSIS 4 INDEX: FIB4 SCORE: 0.34

## 2020-12-30 ASSESSMENT — LIFESTYLE VARIABLES: STIMULANT_USE: 0

## 2020-12-30 NOTE — PROGRESS NOTES
"Subjective:      Niki Riley is a 20 y.o. female who presents with Palpitations (Hx of heart condition. x2days )            Chest Pain   This is a new problem. The current episode started in the past 7 days (2 days). The onset quality is sudden. The problem occurs constantly. Progression since onset: less sharp today but still present. The pain is present in the substernal region. Pain scale: 10/10 pain 2 days ago. 6/10 pain today  The quality of the pain is described as heavy, sharp and pressure. The pain radiates to the left shoulder, right shoulder, right jaw, left jaw and upper back. Associated symptoms include dizziness, palpitations and shortness of breath. Pertinent negatives include no abdominal pain, back pain, cough, diaphoresis, exertional chest pressure, fever, headaches, hemoptysis, irregular heartbeat, lower extremity edema, malaise/fatigue, nausea, near-syncope, sputum production, syncope, vomiting or weakness. She has tried nothing for the symptoms.   Her past medical history is significant for anxiety/panic attacks.   Pertinent negatives for past medical history include no recent injury, no stimulant use and no thyroid problem.   Her family medical history is significant for CAD.     The patient reports that she was under the care of cardiology about 1 year ago. She states an echocardiogram was done that showed \"2 holes in her heart.\" She was supposed to get a \"monitor.\" She states, \"I never followed up because I was afraid of the surgery.\"  She currently has a Mirena IUD. She denies tobacco use, Illicit drug use or alcohol use. She states she does vape.  She has a hx of asthma and does use albuterol.   She denies any recent lower extremity pain. Her pain was so severe a few days ago she was unable to move. She has not had any recent injury or undergone strenuous exercise.   No fever, chills, or hemoptysis. The patient also states she has a history of anxiety and panic attacks- she " "reports that she has never had these symptoms before.    Past Medical History:   Diagnosis Date   • Asthma    • Migraine    • Shoulder pain 8/8/2014   • Urinary tract infection        Past Surgical History:   Procedure Laterality Date   • PELVISCOPY  9/5/2018    Procedure: PELVISCOPY-  OVARIAN CYSTECTOMY;  Surgeon: Kathleen Chen M.D.;  Location: SURGERY SAME DAY [facility];  Service: Gynecology   • GASTROSCOPY  5/24/2014    Performed by Luis Felipe Decker M.D. at SURGERY [facility]       Family History   Problem Relation Age of Onset   • Anxiety disorder Mother    • Heart Disease Maternal Grandfather        Allergies   Allergen Reactions   • Sulfa Drugs    • Honey Dew    • Kiwi Extract Anaphylaxis     Clarified by dietary staff       Medications, Allergies, and current problem list reviewed today in Epic      Review of Systems   Constitutional: Negative for chills, diaphoresis, fever and malaise/fatigue.   Respiratory: Positive for shortness of breath. Negative for cough, hemoptysis, sputum production and wheezing.    Cardiovascular: Positive for chest pain and palpitations. Negative for leg swelling, syncope and near-syncope.   Gastrointestinal: Negative for abdominal pain, diarrhea, nausea and vomiting.   Musculoskeletal: Positive for myalgias (upper back and jaw pain ) and neck pain. Negative for back pain.   Neurological: Positive for dizziness. Negative for tingling, sensory change, focal weakness, weakness and headaches.   Psychiatric/Behavioral: The patient is nervous/anxious.      All other systems reviewed and are negative.          Objective:     /70   Pulse (!) 108   Temp 36.4 °C (97.5 °F) (Temporal)   Resp 12   Ht 1.676 m (5' 6\")   Wt 59.4 kg (131 lb)   SpO2 99%   BMI 21.14 kg/m²      Physical Exam  Constitutional:       General: She is not in acute distress (patient is moderately anxious).     Appearance: She is not ill-appearing or diaphoretic.   HENT:      Head: Normocephalic and " atraumatic.   Eyes:      Conjunctiva/sclera: Conjunctivae normal.   Cardiovascular:      Rate and Rhythm: Regular rhythm. Tachycardia present.      Heart sounds: Normal heart sounds. No murmur. No friction rub. No gallop.    Pulmonary:      Effort: Pulmonary effort is normal. No respiratory distress.      Breath sounds: Normal breath sounds. No wheezing, rhonchi or rales.   Chest:      Chest wall: Tenderness (pain in anterior chest with palpationa and pain with deep breathing ) present.   Musculoskeletal: Normal range of motion.      Right lower leg: No edema.      Left lower leg: No edema.      Comments: Lower extremities without TTP, No edema or erythema.   Skin:     General: Skin is warm and dry.      Findings: No rash.   Neurological:      General: No focal deficit present.      Mental Status: She is alert and oriented to person, place, and time.   Psychiatric:         Mood and Affect: Mood is anxious.         Behavior: Behavior normal.         Thought Content: Thought content normal.         Judgment: Judgment normal.          EKG: NSR. No ST elevation or acute abnormalities.     Chest X-ray: Radiologist read pending.  X-ray negative per my read.     Assessment/Plan:        1. Palpitations  EKG - Clinic Performed    DX-CHEST-2 VIEWS   2. Chest pain, unspecified type         At this time, I feel the patient requires a higher level of care including closer monitoring, stat lab work and/or imaging for further evaluation for complaints of chest pain with unclear history of heart issues. Explained that she needs a cardiac work-up to rule out cardiac etiology for her pain and also rule out PE.     This has been discussed with the patient and they state agreement and understanding.  . The patient is in no acute distress upon clinic departure and will go directly to ED without delay. She states she will have her boyfriend drive her directly to the ED.     The patient demonstrated a good understanding and agreed with  the treatment plan.      Nan Mark P.A.-C.

## 2021-03-09 ENCOUNTER — HOSPITAL ENCOUNTER (OUTPATIENT)
Facility: MEDICAL CENTER | Age: 21
End: 2021-03-09
Attending: PHYSICIAN ASSISTANT
Payer: MEDICAID

## 2021-03-09 ENCOUNTER — OFFICE VISIT (OUTPATIENT)
Dept: URGENT CARE | Facility: PHYSICIAN GROUP | Age: 21
End: 2021-03-09
Payer: MEDICAID

## 2021-03-09 VITALS
BODY MASS INDEX: 20.25 KG/M2 | OXYGEN SATURATION: 96 % | DIASTOLIC BLOOD PRESSURE: 64 MMHG | WEIGHT: 126 LBS | HEART RATE: 84 BPM | SYSTOLIC BLOOD PRESSURE: 114 MMHG | RESPIRATION RATE: 16 BRPM | HEIGHT: 66 IN | TEMPERATURE: 99 F

## 2021-03-09 DIAGNOSIS — R68.89 FLU-LIKE SYMPTOMS: ICD-10-CM

## 2021-03-09 PROCEDURE — U0005 INFEC AGEN DETEC AMPLI PROBE: HCPCS

## 2021-03-09 PROCEDURE — U0003 INFECTIOUS AGENT DETECTION BY NUCLEIC ACID (DNA OR RNA); SEVERE ACUTE RESPIRATORY SYNDROME CORONAVIRUS 2 (SARS-COV-2) (CORONAVIRUS DISEASE [COVID-19]), AMPLIFIED PROBE TECHNIQUE, MAKING USE OF HIGH THROUGHPUT TECHNOLOGIES AS DESCRIBED BY CMS-2020-01-R: HCPCS

## 2021-03-09 PROCEDURE — 99213 OFFICE O/P EST LOW 20 MIN: CPT | Performed by: PHYSICIAN ASSISTANT

## 2021-03-09 NOTE — PROGRESS NOTES
Chief Complaint   Patient presents with   • Cough     cough, scratchy throat, lightheaded, nasueous, fatigue. x2 days.        HISTORY OF PRESENT ILLNESS: Patient is a 20 y.o. female who presents today for the following:    ST x 3-4 days  + fatigue, cough, dizziness, nausea, lightheaded  Feels clammy  Denies vomiting  OTC meds tried: none   LMP: unknown; mirena in place    Patient Active Problem List    Diagnosis Date Noted   • Hospital discharge follow-up 10/01/2019   • Drug-induced sexual dysfunction (HCC) 04/29/2019   • Pain of right scapula 04/29/2019   • Myalgia of muscle of neck 01/17/2019   • Cyst of right ovary 06/27/2018   • Eczema 05/29/2018   • Mild intermittent asthma without complication 12/04/2017   • Chronic right-sided low back pain without sciatica 05/15/2017   • Mild episode of recurrent major depressive disorder (HCC) 12/30/2016       Allergies:Sulfa drugs, Honey dew, and Kiwi extract    Current Outpatient Medications Ordered in Epic   Medication Sig Dispense Refill   • albuterol 108 (90 Base) MCG/ACT Aero Soln inhalation aerosol Inhale 2 Puffs by mouth every 6 hours as needed for Shortness of Breath. 8.5 g 1   • levonorgestrel (MIRENA, 52 MG,) 20 MCG/24HR IUD 1 Each by Intrauterine route Once.       No current Epic-ordered facility-administered medications on file.       Past Medical History:   Diagnosis Date   • Asthma    • Migraine    • Shoulder pain 8/8/2014   • Urinary tract infection        Social History     Tobacco Use   • Smoking status: Never Smoker   • Smokeless tobacco: Never Used   • Tobacco comment: vapes   Substance Use Topics   • Alcohol use: No     Alcohol/week: 0.0 oz   • Drug use: No       Family Status   Relation Name Status   • Mo  Alive   • Fa  Alive   • MGMo  Alive   • MGFa  Alive   • Child  Alive     Family History   Problem Relation Age of Onset   • Anxiety disorder Mother    • Heart Disease Maternal Grandfather        Review of Systems:    Constitutional ROS: +fatigue  Eye  "ROS: No recent significant change in vision, No eye pain, redness, discharge  Ear ROS: No drainage, No tinnitus or vertigo, No recent change in hearing  Mouth/Throat ROS: No teeth or gum problems, No bleeding gums, No tongue complaints  Neck ROS: No swollen glands, No significant pain in neck  Pulmonary ROS: No chronic cough, sputum, or hemoptysis, No dyspnea on exertion, No wheezing  Cardiovascular ROS: No diaphoresis, No edema, No palpitations  Musculoskeletal/Extremities ROS: No peripheral edema, No pain, redness or swelling on the joints  Hematologic/Lymphatic ROS: No chills, No night sweats, No weight loss  Skin/Integumentary ROS: No edema, No evidence of rash, No itching    Exam:  /64   Pulse 84   Temp 37.2 °C (99 °F) (Temporal)   Resp 16   Ht 1.676 m (5' 6\")   Wt 57.2 kg (126 lb)   SpO2 96%   General: Well developed, well nourished. No distress.    Eye: PERRL/EOMI; conjunctivae clear, lids normal.  ENMT: Lips without lesions, MMM. Oropharynx is clear. Bilateral TMs are within normal limits.  Pulmonary: Unlabored respiratory effort. Lungs clear to auscultation, no wheezes, no rhonchi.    Cardiovascular: Regular rate and rhythm without murmur.   Neurologic: Grossly nonfocal. No facial asymmetry noted.  Lymph: No cervical lymphadenopathy noted.  Skin: Warm, dry, good turgor. No rashes in visible areas.   Psych: Normal mood. Alert and oriented to person, place and time.    Assessment/Plan:  Discussed likely viral etiology.  Vitals and exam are unremarkable.  Low suspicion for pneumonia. Patient will be tested for COVID and has been advised to quarantine until results are available. Discussed appropriate over-the-counter symptomatic medication, and when to return to clinic. Follow up for worsening or persistent symptoms.  1. Flu-like symptoms  SARS-CoV-2, PCR (In-House): Collect NP OR nasal swab in VTM       "

## 2021-03-09 NOTE — LETTER
March 9, 2021         Patient: Niki Riley   YOB: 2000   Date of Visit: 3/9/2021           To Whom it May Concern:    Niki Riley was seen in my clinic on 3/9/2021.     Please excuse patient for missing school/work until COVID results are available, typically taking 1-3 days.  They have been advised to quarantine during this time.      If you have any questions or concerns, please don't hesitate to call.        Sincerely,           Arianna Cruz P.A.-C.  Electronically Signed

## 2021-03-10 LAB
COVID ORDER STATUS COVID19: NORMAL
SARS-COV-2 RNA RESP QL NAA+PROBE: NOTDETECTED
SPECIMEN SOURCE: NORMAL

## 2022-01-03 ENCOUNTER — OFFICE VISIT (OUTPATIENT)
Dept: URGENT CARE | Facility: PHYSICIAN GROUP | Age: 22
End: 2022-01-03
Payer: MEDICAID

## 2022-01-03 ENCOUNTER — HOSPITAL ENCOUNTER (OUTPATIENT)
Facility: MEDICAL CENTER | Age: 22
End: 2022-01-03
Attending: STUDENT IN AN ORGANIZED HEALTH CARE EDUCATION/TRAINING PROGRAM
Payer: MEDICAID

## 2022-01-03 VITALS
HEART RATE: 132 BPM | RESPIRATION RATE: 16 BRPM | OXYGEN SATURATION: 98 % | TEMPERATURE: 99.6 F | HEIGHT: 66 IN | DIASTOLIC BLOOD PRESSURE: 58 MMHG | BODY MASS INDEX: 20.09 KG/M2 | SYSTOLIC BLOOD PRESSURE: 114 MMHG | WEIGHT: 125 LBS

## 2022-01-03 DIAGNOSIS — R53.81 MALAISE AND FATIGUE: ICD-10-CM

## 2022-01-03 DIAGNOSIS — R53.83 MALAISE AND FATIGUE: ICD-10-CM

## 2022-01-03 LAB
EXTERNAL QUALITY CONTROL: NORMAL
SARS-COV+SARS-COV-2 AG RESP QL IA.RAPID: POSITIVE

## 2022-01-03 PROCEDURE — 99213 OFFICE O/P EST LOW 20 MIN: CPT | Performed by: STUDENT IN AN ORGANIZED HEALTH CARE EDUCATION/TRAINING PROGRAM

## 2022-01-03 PROCEDURE — 87426 SARSCOV CORONAVIRUS AG IA: CPT | Performed by: STUDENT IN AN ORGANIZED HEALTH CARE EDUCATION/TRAINING PROGRAM

## 2022-01-03 PROCEDURE — U0005 INFEC AGEN DETEC AMPLI PROBE: HCPCS

## 2022-01-03 PROCEDURE — U0003 INFECTIOUS AGENT DETECTION BY NUCLEIC ACID (DNA OR RNA); SEVERE ACUTE RESPIRATORY SYNDROME CORONAVIRUS 2 (SARS-COV-2) (CORONAVIRUS DISEASE [COVID-19]), AMPLIFIED PROBE TECHNIQUE, MAKING USE OF HIGH THROUGHPUT TECHNOLOGIES AS DESCRIBED BY CMS-2020-01-R: HCPCS

## 2022-01-03 RX ORDER — DIPHENHYDRAMINE HYDROCHLORIDE 50 MG/ML
INJECTION INTRAMUSCULAR; INTRAVENOUS
COMMUNITY
Start: 2021-11-27 | End: 2022-03-21

## 2022-01-03 RX ORDER — LORATADINE 10 MG/1
1 TABLET ORAL
COMMUNITY
End: 2022-03-21

## 2022-01-03 RX ORDER — ONDANSETRON 4 MG/1
TABLET, ORALLY DISINTEGRATING ORAL
COMMUNITY
End: 2022-03-21

## 2022-01-03 RX ORDER — METHYLPREDNISOLONE 4 MG/1
TABLET ORAL
COMMUNITY
Start: 2021-11-27 | End: 2022-03-21

## 2022-01-03 RX ORDER — DEXAMETHASONE SODIUM PHOSPHATE 4 MG/ML
INJECTION, SOLUTION INTRA-ARTICULAR; INTRALESIONAL; INTRAMUSCULAR; INTRAVENOUS; SOFT TISSUE
COMMUNITY
Start: 2021-11-27 | End: 2022-03-21

## 2022-01-03 RX ORDER — MECLIZINE HCL 25MG 25 MG/1
TABLET, CHEWABLE ORAL
COMMUNITY
End: 2022-03-21

## 2022-01-03 RX ORDER — SODIUM CHLORIDE 9 MG/ML
INJECTION, SOLUTION INTRAVENOUS
COMMUNITY
Start: 2021-11-27 | End: 2022-03-21

## 2022-01-03 ASSESSMENT — ENCOUNTER SYMPTOMS
COUGH: 1
SORE THROAT: 1
FEVER: 1
CHILLS: 1

## 2022-01-03 NOTE — PROGRESS NOTES
"Subjective     Niki Riley is a 21 y.o. female who presents with Coronavirus Screening (Sore throat, congestion, facial pressure, cough, x3days )            Patient is very agreeable 21-year-old female who presents to clinic with complaints of subjective fevers malaise and generalized fatigue for approximately 3 days.  Patient has not been vaccinated for COVID-19.  Patient denies recent sick contacts.  Patient presents to clinic for further testing evaluation.      Review of Systems   Constitutional: Positive for chills, fever and malaise/fatigue.   HENT: Positive for congestion and sore throat.    Respiratory: Positive for cough.               Objective     /58   Pulse (!) 132   Temp 37.6 °C (99.6 °F) (Temporal)   Resp 16   Ht 1.676 m (5' 6\")   Wt 56.7 kg (125 lb)   SpO2 98%   BMI 20.18 kg/m²      Physical Exam  Vitals reviewed.   Constitutional:       General: She is not in acute distress.     Appearance: She is ill-appearing. She is not toxic-appearing.   HENT:      Nose: Congestion and rhinorrhea present.                             Assessment & Plan        1. Malaise and fatigue  Not vaccinated patient for COVID-19 with symptomatology highly suggestive of COVID-19 infection  Plan:  1.  COVID-19 rapid test  2.  COVID-19 PCR test    Patient was counseled to continue use over-the-counter medication Tylenol ibuprofen intermittently and as needed for symptom support.  Patient was counseled that should her symptoms suddenly worsen or she experienced severe shortness of breath immediately go to nearest emergency department.  Patient endorsed understanding.  - POCT SARS-COV Antigen MAYO (Symptomatic Only)  - SARS-CoV-2 PCR (24 hour In-House): Collect NP swab in VTM; Future                "

## 2022-01-04 DIAGNOSIS — R53.81 MALAISE AND FATIGUE: ICD-10-CM

## 2022-01-04 DIAGNOSIS — R53.83 MALAISE AND FATIGUE: ICD-10-CM

## 2022-01-05 LAB
COVID ORDER STATUS COVID19: NORMAL
SARS-COV-2 RNA RESP QL NAA+PROBE: DETECTED
SPECIMEN SOURCE: ABNORMAL

## 2022-02-09 ENCOUNTER — OFFICE VISIT (OUTPATIENT)
Dept: URGENT CARE | Facility: PHYSICIAN GROUP | Age: 22
End: 2022-02-09
Payer: MEDICAID

## 2022-02-09 VITALS
OXYGEN SATURATION: 97 % | HEART RATE: 88 BPM | BODY MASS INDEX: 18.8 KG/M2 | WEIGHT: 117 LBS | SYSTOLIC BLOOD PRESSURE: 104 MMHG | TEMPERATURE: 97.7 F | DIASTOLIC BLOOD PRESSURE: 70 MMHG | HEIGHT: 66 IN

## 2022-02-09 DIAGNOSIS — R11.0 NAUSEA: ICD-10-CM

## 2022-02-09 PROBLEM — F41.9 ANXIETY: Status: ACTIVE | Noted: 2021-02-12

## 2022-02-09 PROBLEM — R00.2 PALPITATIONS: Status: ACTIVE | Noted: 2021-02-12

## 2022-02-09 PROCEDURE — 99213 OFFICE O/P EST LOW 20 MIN: CPT | Performed by: NURSE PRACTITIONER

## 2022-02-09 RX ORDER — PROMETHAZINE HYDROCHLORIDE 25 MG/1
25 TABLET ORAL EVERY 8 HOURS PRN
Qty: 12 TABLET | Refills: 0 | Status: SHIPPED | OUTPATIENT
Start: 2022-02-09 | End: 2022-02-13

## 2022-02-09 ASSESSMENT — ENCOUNTER SYMPTOMS
FLANK PAIN: 0
DIZZINESS: 0
WEAKNESS: 0
HEADACHES: 0
CONSTIPATION: 0
CHILLS: 0
NAUSEA: 1
ABDOMINAL PAIN: 0
MYALGIAS: 0
DIARRHEA: 0
VOMITING: 0
PALPITATIONS: 0
ORTHOPNEA: 0
HEARTBURN: 0
FEVER: 0

## 2022-02-09 NOTE — PROGRESS NOTES
"Subjective     Niki Berkley Riley is a 21 y.o. female who presents with Nausea and Emesis (Since 1/3/22)            HPI  States seen in  for nausea and vomiting a month ago. States performed blood work and gave IV fluids and \"something for nausea\", discharged on Zofran. States this medication causes more nausea. Only able to eat nuts and now this causes nausea. States was able to eat orange chicken last night. States she was referred to someone in [place] for anorexia, but adits that she has not done this yet. No current PCP.     PMH:  has a past medical history of Asthma, Migraine, Shoulder pain (8/8/2014), and Urinary tract infection. She also has no past medical history of Blood transfusion without reported diagnosis or Kidney disease.  MEDS:   Current Outpatient Medications:   •  promethazine (PHENERGAN) 25 MG Tab, Take 1 Tablet by mouth every 8 hours as needed for Nausea/Vomiting for up to 4 days. Causes drowsiness, do not drive or work while using. Caution with use with other sedating medications., Disp: 12 Tablet, Rfl: 0  •  loratadine (CLARITIN) 10 MG Tab, Take 1 Tablet by mouth every day., Disp: , Rfl:   •  ondansetron (ZOFRAN ODT) 4 MG TABLET DISPERSIBLE, 1 tablet on the tongue and allow to dissolve Orally Once a day for 30 day(s), Disp: , Rfl:   •  levonorgestrel (MIRENA, 52 MG,) 20 MCG/24HR IUD, 1 Each by Intrauterine route Once., Disp: , Rfl:   •  dexamethasone (DECADRON) 4 MG/ML Solution, , Disp: , Rfl:   •  diphenhydrAMINE (BENADRYL) 50 MG/ML Solution, , Disp: , Rfl:   •  Meclizine HCl 25 MG Chew Tab, 1 tablet as needed Orally once or twice a day PRN (Patient not taking: Reported on 1/3/2022), Disp: , Rfl:   •  methylPREDNISolone (MEDROL DOSEPAK) 4 MG Tablet Therapy Pack, TAKE BY MOUTH AS DIRECTED ON INSIDE OF PACKAGE (Patient not taking: Reported on 1/3/2022), Disp: , Rfl:   •  SOLU-MEDROL 125 MG Recon Soln, , Disp: , Rfl:   •  Sodium Chloride (NS) Solution, , Disp: , Rfl:   •  albuterol 108 " "(90 Base) MCG/ACT Aero Soln inhalation aerosol, Inhale 2 Puffs by mouth every 6 hours as needed for Shortness of Breath. (Patient not taking: Reported on 1/3/2022), Disp: 8.5 g, Rfl: 1  ALLERGIES:   Allergies   Allergen Reactions   • Sulfa Drugs    • Honey Dew    • Kiwi Extract Anaphylaxis     Clarified by dietary staff     SURGHX:   Past Surgical History:   Procedure Laterality Date   • PELVISCOPY  9/5/2018    Procedure: PELVISCOPY-  OVARIAN CYSTECTOMY;  Surgeon: Kathleen Chen M.D.;  Location: SURGERY SAME DAY [facility];  Service: Gynecology   • GASTROSCOPY  5/24/2014    Performed by Luis Felipe Decker M.D. at SURGERY [facility]     SOCHX:  reports that she has never smoked. She has never used smokeless tobacco. She reports that she does not drink alcohol and does not use drugs.  FH: Family history was reviewed, no pertinent findings to report    Review of Systems   Constitutional: Negative for chills, fever and malaise/fatigue.   Cardiovascular: Negative for chest pain, palpitations and orthopnea.   Gastrointestinal: Positive for nausea. Negative for abdominal pain, constipation, diarrhea, heartburn and vomiting.   Genitourinary: Negative for dysuria, flank pain, frequency, hematuria and urgency.   Musculoskeletal: Negative for myalgias.   Neurological: Negative for dizziness, weakness and headaches.   All other systems reviewed and are negative.             Objective     /70   Pulse 88   Temp 36.5 °C (97.7 °F)   Ht 1.676 m (5' 6\")   Wt 53.1 kg (117 lb)   SpO2 97%   BMI 18.88 kg/m²      Physical Exam  Vitals reviewed.   Constitutional:       General: She is awake. She is not in acute distress.     Appearance: She is well-developed. She is not ill-appearing, toxic-appearing or diaphoretic.   HENT:      Head: Normocephalic.   Eyes:      Conjunctiva/sclera: Conjunctivae normal.      Pupils: Pupils are equal, round, and reactive to light.   Cardiovascular:      Rate and Rhythm: Normal rate and " regular rhythm.      Heart sounds: Normal heart sounds.   Pulmonary:      Effort: Pulmonary effort is normal. No respiratory distress.      Breath sounds: Normal breath sounds. No wheezing or rales.   Abdominal:      General: Bowel sounds are normal. There is no distension.      Palpations: Abdomen is soft.      Tenderness: There is no abdominal tenderness. There is no guarding or rebound.   Musculoskeletal:         General: Normal range of motion.      Cervical back: Normal range of motion and neck supple.   Skin:     General: Skin is warm and dry.   Neurological:      Mental Status: She is alert and oriented to person, place, and time.   Psychiatric:         Behavior: Behavior is cooperative.                             Assessment & Plan        1. Nausea    - promethazine (PHENERGAN) 25 MG Tab; Take 1 Tablet by mouth every 8 hours as needed for Nausea/Vomiting for up to 4 days. Causes drowsiness, do not drive or work while using. Caution with use with other sedating medications.  Dispense: 12 Tablet; Refill: 0  - Referral to Gastroenterology  - Referral to establish with [facility] PCP    -May use over the counter Pepcid/Tums as needed for any acute acid reflux symptoms  -Maintain hydration status  -Avoid triggers for nausea like alcohol/high caffeine/high sugar or fat intake  -Get rest  -Monitor for fever, severe abdominal pain, nausea/vomiting, unable to retain fluids or solids, unable to urinate or have bowel movement- need re-evaluation- may need to go to ER, patient understands this  -Sign up with new PCP for general health exam  -Follow up with Gastroenterology   -Recommend to follow up with referral from [facility] ER for evaluation of possible anorexia  -Gave patient [facility] Scheduling number to set up for new PCP in [facility]

## 2022-02-09 NOTE — LETTER
February 9, 2022       Patient: Niki Riley   YOB: 2000   Date of Visit: 2/9/2022         To Whom It May Concern:    In my medical opinion, I recommend that Niki Riley be excused from work today due to non-respiratory illness.     If you have any questions or concerns, please don't hesitate to call [phone]          Sincerely,          ZEHRA MartinRDionneN.  Electronically Signed

## 2022-03-21 ENCOUNTER — OFFICE VISIT (OUTPATIENT)
Dept: MEDICAL GROUP | Facility: CLINIC | Age: 22
End: 2022-03-21
Payer: MEDICAID

## 2022-03-21 VITALS
RESPIRATION RATE: 16 BRPM | SYSTOLIC BLOOD PRESSURE: 90 MMHG | DIASTOLIC BLOOD PRESSURE: 62 MMHG | HEIGHT: 66 IN | OXYGEN SATURATION: 98 % | TEMPERATURE: 97.8 F | WEIGHT: 116.6 LBS | HEART RATE: 84 BPM | BODY MASS INDEX: 18.74 KG/M2

## 2022-03-21 DIAGNOSIS — E16.2 HYPOGLYCEMIA: ICD-10-CM

## 2022-03-21 DIAGNOSIS — F41.9 ANXIETY: ICD-10-CM

## 2022-03-21 DIAGNOSIS — R63.4 WEIGHT LOSS: ICD-10-CM

## 2022-03-21 DIAGNOSIS — R63.6 UNDERWEIGHT: ICD-10-CM

## 2022-03-21 DIAGNOSIS — F33.0 MILD EPISODE OF RECURRENT MAJOR DEPRESSIVE DISORDER (HCC): ICD-10-CM

## 2022-03-21 DIAGNOSIS — R11.0 NAUSEA: ICD-10-CM

## 2022-03-21 PROCEDURE — 99214 OFFICE O/P EST MOD 30 MIN: CPT | Performed by: PHYSICIAN ASSISTANT

## 2022-03-21 RX ORDER — HYDROXYZINE HYDROCHLORIDE 10 MG/1
TABLET, FILM COATED ORAL
COMMUNITY
Start: 2022-02-10 | End: 2022-03-21 | Stop reason: SDUPTHER

## 2022-03-21 RX ORDER — FLUOXETINE 10 MG/1
10 CAPSULE ORAL DAILY
Qty: 30 CAPSULE | Refills: 2 | Status: SHIPPED | OUTPATIENT
Start: 2022-03-21 | End: 2022-04-27

## 2022-03-21 RX ORDER — HYDROXYZINE HYDROCHLORIDE 10 MG/1
TABLET, FILM COATED ORAL
Qty: 50 TABLET | Refills: 1 | Status: SHIPPED | OUTPATIENT
Start: 2022-03-21 | End: 2022-09-25

## 2022-03-21 ASSESSMENT — PATIENT HEALTH QUESTIONNAIRE - PHQ9
8. MOVING OR SPEAKING SO SLOWLY THAT OTHER PEOPLE COULD HAVE NOTICED. OR THE OPPOSITE, BEING SO FIGETY OR RESTLESS THAT YOU HAVE BEEN MOVING AROUND A LOT MORE THAN USUAL: NEARLY EVERY DAY
4. FEELING TIRED OR HAVING LITTLE ENERGY: NEARLY EVERY DAY
1. LITTLE INTEREST OR PLEASURE IN DOING THINGS: SEVERAL DAYS
2. FEELING DOWN, DEPRESSED, IRRITABLE, OR HOPELESS: SEVERAL DAYS
6. FEELING BAD ABOUT YOURSELF - OR THAT YOU ARE A FAILURE OR HAVE LET YOURSELF OR YOUR FAMILY DOWN: SEVERAL DAYS
3. TROUBLE FALLING OR STAYING ASLEEP OR SLEEPING TOO MUCH: MORE THAN HALF THE DAYS
9. THOUGHTS THAT YOU WOULD BE BETTER OFF DEAD, OR OF HURTING YOURSELF: NOT AT ALL
5. POOR APPETITE OR OVEREATING: NEARLY EVERY DAY
SUM OF ALL RESPONSES TO PHQ QUESTIONS 1-9: 15
SUM OF ALL RESPONSES TO PHQ9 QUESTIONS 1 AND 2: 2
7. TROUBLE CONCENTRATING ON THINGS, SUCH AS READING THE NEWSPAPER OR WATCHING TELEVISION: SEVERAL DAYS

## 2022-03-21 NOTE — PROGRESS NOTES
"cc:  anorexia    Subjective:     Niki Riley is a 21 y.o. female presenting for anorexia      Patient presents to the office for not eating.  She states that she has not been able to eat as she is not having an appetite.  She states that this has been going on for several weeks.  She states that she was between 126 to 127.  She states that she went to the ER in [place].  We currently do not have records.  She states that she was placed on an anti-nausea medication.  She states that she was also set up with an anorexia specialist.  She states that her appointment is in April.  She states that she is nauseated every time she eats and is vomiting.  Hydroxyzine helps with nausea but she cannot take at work. Anti nausea medication does not help as she vomits it up as soon as she takes it.  She states that her sugars were \"super low\".  She was also told she was dehydrated.  She has had to go home from work because of how the hydroxyzine has made her feel. She states that she does want to eat but she cannot keep food down.  She denies stomach pain.    Review of systems:  See above.   Denies any symptoms unless previously indicated.        Current Outpatient Medications:   •  FLUoxetine (PROZAC) 10 MG Cap, Take 1 Capsule by mouth every day., Disp: 30 Capsule, Rfl: 2  •  hydrOXYzine HCl (ATARAX) 10 MG Tab, TAKE 1 TO 2 TABLETS BY MOUTH 4 TIMES DAILY FOR 3 DAYS, Disp: 50 Tablet, Rfl: 1  •  albuterol 108 (90 Base) MCG/ACT Aero Soln inhalation aerosol, Inhale 2 Puffs by mouth every 6 hours as needed for Shortness of Breath., Disp: 8.5 g, Rfl: 1  •  levonorgestrel (MIRENA) 52 mg (20 mcg/24 hr) IUD, 1 Each by Intrauterine route Once., Disp: , Rfl:     Allergies, past medical history, past surgical history, family history, social history reviewed and updated    Objective:     Vitals: BP (!) 90/62 (BP Location: Left arm, Patient Position: Sitting, BP Cuff Size: Adult)   Pulse 84   Temp 36.6 °C (97.8 °F) (Temporal)   " "Resp 16   Ht 1.676 m (5' 6\")   Wt 52.9 kg (116 lb 9.6 oz)   SpO2 98%   BMI 18.82 kg/m²   General: Alert, pleasant, NAD  EYES:   PERRL, EOMI, no icterus or pallor.  Conjunctivae and lids normal.   HENT:  Normocephalic.  External ears normal.     Neck supple.   No thyromegaly or masses palpated. No cervical or supraclavicular lymphadenopathy.  Respiratory: Normal respiratory effort.    Abdomen: Not obese  Skin: Warm, dry, no rashes.  Musculoskeletal: Gait is normal.  Moves all extremities well.    Extremities: normal range of motion all extremities.   Neurological: No tremors, sensation grossly intact,  gait is normal, CN2-12 intact.  Psych:  Affect/mood is normal, judgement is good, memory is intact, grooming is appropriate.    Assessment/Plan:     Niki was seen today for establish care and follow-up.    Diagnoses and all orders for this visit:    Underweight  -     Lipid Profile; Future  -     CBC WITH DIFFERENTIAL; Future  -     Comp Metabolic Panel; Future  -     TSH; Future  -     FREE THYROXINE; Future  -     VITAMIN D,25 HYDROXY; Future  -     VITAMIN B12; Future  -     FOLATE; Future  -     IRON/TOTAL IRON BIND; Future  -     H. PYLORI BREATH TEST  -     CORTISOL; Future  Nausea  -     CBC WITH DIFFERENTIAL; Future  -     Comp Metabolic Panel; Future  -     H. PYLORI BREATH TEST  -     hydrOXYzine HCl (ATARAX) 10 MG Tab; TAKE 1 TO 2 TABLETS BY MOUTH 4 TIMES DAILY FOR 3 DAYS  Weight loss  -     Lipid Profile; Future  -     CBC WITH DIFFERENTIAL; Future  -     Comp Metabolic Panel; Future  -     TSH; Future  -     FREE THYROXINE; Future  Hypoglycemia  -     CBC WITH DIFFERENTIAL; Future  -     Comp Metabolic Panel; Future  -     HEMOGLOBIN A1C; Future  Mild episode of recurrent major depressive disorder (HCC)  -     FLUoxetine (PROZAC) 10 MG Cap; Take 1 Capsule by mouth every day.  Anxiety  -     FLUoxetine (PROZAC) 10 MG Cap; Take 1 Capsule by mouth every day.    Etiology of weight loss is unknown.  It " may be anxiety/depression related.  However there may also be a physiologic reason.  We will refill hydroxyzine as this is worked best for her nausea at this time.  We will start her on fluoxetine as she has done well with this medication in the past.  We will follow-up in 2 weeks with test results and reevaluation of medication.  We are not able to draw all labs recommended at this time.  Further labs to be ordered for some adrenal testing as well.  We will need to order these at next visit.  In the meantime we will try to obtain ER records.  Information for GI has been given today.          Return in about 2 weeks (around 4/4/2022), or if symptoms worsen or fail to improve.    Please note that this dictation was created using voice recognition software. I have made every reasonable attempt to correct obvious errors, but expect that there are errors of grammar and possible content that I did not discover before finalizing note.

## 2022-04-01 ENCOUNTER — OFFICE VISIT (OUTPATIENT)
Dept: URGENT CARE | Facility: PHYSICIAN GROUP | Age: 22
End: 2022-04-01
Payer: MEDICAID

## 2022-04-01 VITALS
BODY MASS INDEX: 18.48 KG/M2 | WEIGHT: 115 LBS | HEIGHT: 66 IN | DIASTOLIC BLOOD PRESSURE: 66 MMHG | OXYGEN SATURATION: 98 % | HEART RATE: 96 BPM | TEMPERATURE: 98.1 F | SYSTOLIC BLOOD PRESSURE: 92 MMHG | RESPIRATION RATE: 16 BRPM

## 2022-04-01 DIAGNOSIS — R50.81 FEVER IN OTHER DISEASES: ICD-10-CM

## 2022-04-01 DIAGNOSIS — R10.10 PAIN OF UPPER ABDOMEN: ICD-10-CM

## 2022-04-01 DIAGNOSIS — J02.9 SORE THROAT: ICD-10-CM

## 2022-04-01 DIAGNOSIS — B34.9 VIRAL ILLNESS: ICD-10-CM

## 2022-04-01 LAB
APPEARANCE UR: CLEAR
BILIRUB UR STRIP-MCNC: NEGATIVE MG/DL
COLOR UR AUTO: YELLOW
FLUAV+FLUBV AG SPEC QL IA: NEGATIVE
GLUCOSE UR STRIP.AUTO-MCNC: NEGATIVE MG/DL
INT CON NEG: NORMAL
INT CON POS: NORMAL
KETONES UR STRIP.AUTO-MCNC: NEGATIVE MG/DL
LEUKOCYTE ESTERASE UR QL STRIP.AUTO: NEGATIVE
NITRITE UR QL STRIP.AUTO: NEGATIVE
PH UR STRIP.AUTO: 7.5 [PH] (ref 5–8)
PROT UR QL STRIP: NEGATIVE MG/DL
RBC UR QL AUTO: NORMAL
SP GR UR STRIP.AUTO: 1.02
UROBILINOGEN UR STRIP-MCNC: NEGATIVE MG/DL

## 2022-04-01 PROCEDURE — 87804 INFLUENZA ASSAY W/OPTIC: CPT | Mod: QW

## 2022-04-01 PROCEDURE — 81002 URINALYSIS NONAUTO W/O SCOPE: CPT

## 2022-04-01 PROCEDURE — 99213 OFFICE O/P EST LOW 20 MIN: CPT | Mod: 25

## 2022-04-01 RX ORDER — ACETAMINOPHEN 325 MG/1
650 TABLET ORAL EVERY 4 HOURS PRN
COMMUNITY
End: 2022-04-27

## 2022-04-01 ASSESSMENT — ENCOUNTER SYMPTOMS
SINUS PAIN: 0
COUGH: 1
MYALGIAS: 1
WHEEZING: 0
ABDOMINAL PAIN: 1
FLANK PAIN: 0
CHILLS: 1
SHORTNESS OF BREATH: 0
FEVER: 1
HEADACHES: 1
SPUTUM PRODUCTION: 1
SORE THROAT: 1
VOMITING: 0
EYES NEGATIVE: 1
DIARRHEA: 0
CONSTIPATION: 0
DIZZINESS: 0
NAUSEA: 1

## 2022-04-01 NOTE — PROGRESS NOTES
Subjective     Niki Riley is a 21 y.o. female who presents with Cough (X4days ), Fever, and Nasal Congestion        HPI     Patient presents today with symptoms for 4 days. She endorses nasal congestion, cough, sore throat, headache, and fever.  She reports that her temperature was highest at 100.9.  This was accompanied by chills, fatigue, bilateral ear pain, myalgias.  She also reports abdominal pain, states upper abdominal area, but cannot really pinpoint the location.  She describes the pain as sharp, nonradiating, not food related.  She does report one episode of vomiting 2 days ago after eating Chinese takeout. She denies any known sick contacts. She is not COVID vaccinated.     PMH:  has a past medical history of Asthma, Migraine, Shoulder pain (8/8/2014), and Urinary tract infection.    She has no past medical history of Blood transfusion without reported diagnosis or Kidney disease.  MEDS:   Current Outpatient Medications:   •  DM-Phenylephrine-Acetaminophen (ROBITUSSIN COLD+FLU DAYTIME PO), Take  by mouth., Disp: , Rfl:   •  acetaminophen (TYLENOL) 325 MG Tab, Take 650 mg by mouth every four hours as needed., Disp: , Rfl:   •  FLUoxetine (PROZAC) 10 MG Cap, Take 1 Capsule by mouth every day., Disp: 30 Capsule, Rfl: 2  •  hydrOXYzine HCl (ATARAX) 10 MG Tab, TAKE 1 TO 2 TABLETS BY MOUTH 4 TIMES DAILY FOR 3 DAYS, Disp: 50 Tablet, Rfl: 1  •  albuterol 108 (90 Base) MCG/ACT Aero Soln inhalation aerosol, Inhale 2 Puffs by mouth every 6 hours as needed for Shortness of Breath., Disp: 8.5 g, Rfl: 1  •  levonorgestrel (MIRENA) 52 mg (20 mcg/24 hr) IUD, 1 Each by Intrauterine route Once., Disp: , Rfl:   ALLERGIES:   Allergies   Allergen Reactions   • Sulfa Drugs    • Honey Dew    • Kiwi Extract Anaphylaxis     Clarified by dietary staff     SURGHX:   Past Surgical History:   Procedure Laterality Date   • PELVISCOPY  9/5/2018    Procedure: PELVISCOPY-  OVARIAN CYSTECTOMY;  Surgeon: Kathleen Chen,  "M.D.;  Location: SURGERY SAME DAY [facility];  Service: Gynecology   • GASTROSCOPY  5/24/2014    Performed by Luis Felipe Decker M.D. at SURGERY [facility]     SOCHX:  reports that she has never smoked. She has never used smokeless tobacco. She reports that she does not drink alcohol and does not use drugs.  FH: Reviewed with patient, not pertinent to this visit.       Review of Systems   Constitutional: Positive for chills, fever and malaise/fatigue.   HENT: Positive for congestion, ear pain (bilateral) and sore throat. Negative for sinus pain.    Eyes: Negative.    Respiratory: Positive for cough and sputum production. Negative for shortness of breath and wheezing.    Gastrointestinal: Positive for abdominal pain and nausea. Negative for constipation, diarrhea and vomiting.   Genitourinary: Negative for dysuria, flank pain, frequency, hematuria and urgency.   Musculoskeletal: Positive for myalgias.   Neurological: Positive for headaches. Negative for dizziness.              Objective     BP (!) 92/66   Pulse 96   Temp 36.7 °C (98.1 °F) (Temporal)   Resp 16   Ht 1.676 m (5' 6\")   Wt 52.2 kg (115 lb)   SpO2 98%   BMI 18.56 kg/m²      Physical Exam  Constitutional:       Appearance: Normal appearance.   HENT:      Head: Normocephalic.      Nose: Congestion present.      Mouth/Throat:      Pharynx: Posterior oropharyngeal erythema present. No pharyngeal swelling, oropharyngeal exudate or uvula swelling.   Eyes:      Extraocular Movements: Extraocular movements intact.   Cardiovascular:      Rate and Rhythm: Normal rate and regular rhythm.      Pulses: Normal pulses.      Heart sounds: Normal heart sounds.   Pulmonary:      Effort: Pulmonary effort is normal.      Breath sounds: Normal breath sounds.   Abdominal:      General: Abdomen is flat. Bowel sounds are normal.      Tenderness: There is no abdominal tenderness. There is no right CVA tenderness, left CVA tenderness, guarding or rebound. "   Musculoskeletal:         General: Normal range of motion.      Cervical back: Normal range of motion. Tenderness present.   Lymphadenopathy:      Cervical: No cervical adenopathy.   Skin:     General: Skin is warm and dry.   Neurological:      General: No focal deficit present.      Mental Status: She is alert.   Psychiatric:         Mood and Affect: Mood normal.         Behavior: Behavior normal.         Judgment: Judgment normal.     Results:    Influenza A/B- negative  COVID-  Urinalysis:   leukocyte-negative; blood-trace intact; glucose, bilirubin, ketone,   protein, nitrate-negative       Assessment & Plan     1. Viral illness      2. Sore throat    - POCT SARS-COV Antigen MAYO manual result ([facility] Only); Standing  - POCT SARS-COV Antigen MAYO manual result ([facility] Only)  - POCT Influenza A/B    3. Pain of upper abdomen    - POCT Urinalysis  - POCT Pregnancy    4. Fever in other diseases    - POCT Influenza A/B    Patient is negative for Covid and influenza a/B.  Patient most likely has a viral illness. Discussed treatment plan which is mostly supportive, patient is agreeable and verbalized understanding.  Discussed differentials for abdominal pain including but not limited to GERD, gastritis, stress related, muscular.  Recommended taking OTC antacids if pain is food related.  It is most likely muscular in nature considering it is related to physical activity.    Recommended supportive treatment at home:  OTC Tylenol or Motrin for fever/discomfort.  OTC supportive care for nasal congestion - saline nasal spray/Flonase nasal spray and/or netipot  Humidifier and steam inhalation/warm showers.  Increase oral fluid intake.    Differential diagnoses, supportive care, and indications for immediate follow-up discussed with patient. Pathogenesis of diagnosis discussed including typical length and natural progression.     Instructed to return to clinic or nearest emergency department for any change in condition, further  concerns, or worsening of symptoms.    Work note provided.     Electronically Signed by DYLAN Cabrera

## 2022-04-01 NOTE — LETTER
"MICHAELA  [facility] [address] [address]     April 1, 2022    Patient: Niki Riley   YOB: 2000   Date of Visit: 4/1/2022       To Whom It May Concern:    Niki Riley was seen and treated in our department on 4/1/2022.     Sincerely,     Cammie Infante \"Sweet\" DYLAN Ho               "

## 2022-04-18 ENCOUNTER — OFFICE VISIT (OUTPATIENT)
Dept: MEDICAL GROUP | Facility: CLINIC | Age: 22
End: 2022-04-18
Payer: MEDICAID

## 2022-04-18 VITALS
RESPIRATION RATE: 16 BRPM | HEART RATE: 100 BPM | SYSTOLIC BLOOD PRESSURE: 94 MMHG | HEIGHT: 66 IN | WEIGHT: 114 LBS | BODY MASS INDEX: 18.32 KG/M2 | OXYGEN SATURATION: 100 % | DIASTOLIC BLOOD PRESSURE: 60 MMHG | TEMPERATURE: 99 F

## 2022-04-18 DIAGNOSIS — N63.0 BREAST LUMP: ICD-10-CM

## 2022-04-18 PROCEDURE — 99213 OFFICE O/P EST LOW 20 MIN: CPT | Performed by: PHYSICIAN ASSISTANT

## 2022-04-18 RX ORDER — CEPHALEXIN 250 MG/1
250 CAPSULE ORAL 4 TIMES DAILY
Qty: 40 CAPSULE | Refills: 0 | Status: SHIPPED | OUTPATIENT
Start: 2022-04-18 | End: 2022-04-27

## 2022-04-18 NOTE — PROGRESS NOTES
"cc: Breast lump    Subjective:     Niki Riley is a 21 y.o. female presenting for breast lump    Patient presents to the office for breast lump.  Patient is complaining of a painful breast lump for approximately 3 days on the right side.    She states that she can't sleep on her right side.  She states that her bra makes it hurt.  She is not breast feeding.      Review of systems:  See above.   Denies any symptoms unless previously indicated.        Current Outpatient Medications:   •  cephALEXin (KEFLEX) 250 MG Cap, Take 1 Capsule by mouth 4 times a day., Disp: 40 Capsule, Rfl: 0  •  hydrOXYzine HCl (ATARAX) 10 MG Tab, TAKE 1 TO 2 TABLETS BY MOUTH 4 TIMES DAILY FOR 3 DAYS, Disp: 50 Tablet, Rfl: 1  •  albuterol 108 (90 Base) MCG/ACT Aero Soln inhalation aerosol, Inhale 2 Puffs by mouth every 6 hours as needed for Shortness of Breath., Disp: 8.5 g, Rfl: 1  •  levonorgestrel (MIRENA) 52 mg (20 mcg/24 hr) IUD, 1 Each by Intrauterine route Once., Disp: , Rfl:   •  DM-Phenylephrine-Acetaminophen (ROBITUSSIN COLD+FLU DAYTIME PO), Take  by mouth. (Patient not taking: Reported on 4/18/2022), Disp: , Rfl:   •  acetaminophen (TYLENOL) 325 MG Tab, Take 650 mg by mouth every four hours as needed. (Patient not taking: Reported on 4/18/2022), Disp: , Rfl:   •  FLUoxetine (PROZAC) 10 MG Cap, Take 1 Capsule by mouth every day. (Patient not taking: Reported on 4/18/2022), Disp: 30 Capsule, Rfl: 2    Allergies, past medical history, past surgical history, family history, social history reviewed and updated    Objective:     Vitals: BP (!) 94/60 (BP Location: Left arm, Patient Position: Sitting, BP Cuff Size: Adult)   Pulse 100   Temp 37.2 °C (99 °F) (Temporal)   Resp 16   Ht 1.676 m (5' 6\") Comment: stated by pt  Wt 51.7 kg (114 lb) Comment: with shoeson  SpO2 100%   BMI 18.40 kg/m²   General: Alert, pleasant, NAD  EYES:   PERRL, EOMI, no icterus or pallor.  Conjunctivae and lids normal.   HENT:  Normocephalic.  " External ears normal.   Neck supple.    Breast:   Large palpable lump that is irregular under the breast and firm to palpation.  Painful with pressure.    Respiratory: Normal respiratory effort.    Abdomen: Not obese  Skin: Warm, dry, no rashes.  Musculoskeletal: Gait is normal.  Moves all extremities well.    Extremities: normal range of motion all extremities.   Neurological: No tremors, sensation grossly intact,  CN2-12 intact.  Psych:  Affect/mood is normal, judgement is good, memory is intact, grooming is appropriate.    Assessment/Plan:     Niki was seen today for breast mass.    Diagnoses and all orders for this visit:    Breast lump  -     US-BREAST LIMITED-RIGHT; Future  -     cephALEXin (KEFLEX) 250 MG Cap; Take 1 Capsule by mouth 4 times a day.      Possibly an infected duct.  We will have patient start warm compresses, avoid caffeine, start on cephalexin 250 mg 4 times a day for the next 10 days.  Obtain a breast ultrasound for further evaluation and follow-up in 1 week.  She will contact me if there are any issues or concerns.    No follow-ups on file.    Please note that this dictation was created using voice recognition software. I have made every reasonable attempt to correct obvious errors, but expect that there are errors of grammar and possible content that I did not discover before finalizing note.

## 2022-04-19 ENCOUNTER — TELEPHONE (OUTPATIENT)
Dept: MEDICAL GROUP | Facility: CLINIC | Age: 22
End: 2022-04-19
Payer: MEDICAID

## 2022-04-19 NOTE — TELEPHONE ENCOUNTER
VOICEMAIL  1. Caller Name: Niki Riley                         Call Back Number: [phone] (home)       2. Message: pt called to say that she tried to call [facility] and set up appointment for order from yesterdays appointment.     I called and let her know that I refaxed it this morning and to let us know if there is anything more we can do     3. Patient approves office to leave a detailed voicemail/MyChart message: N\A

## 2022-04-27 ENCOUNTER — OFFICE VISIT (OUTPATIENT)
Dept: MEDICAL GROUP | Facility: CLINIC | Age: 22
End: 2022-04-27
Payer: MEDICAID

## 2022-04-27 VITALS
WEIGHT: 113.4 LBS | TEMPERATURE: 98.5 F | BODY MASS INDEX: 18.23 KG/M2 | OXYGEN SATURATION: 95 % | RESPIRATION RATE: 14 BRPM | HEIGHT: 66 IN | DIASTOLIC BLOOD PRESSURE: 68 MMHG | HEART RATE: 95 BPM | SYSTOLIC BLOOD PRESSURE: 102 MMHG

## 2022-04-27 DIAGNOSIS — N63.0 BREAST LUMP: ICD-10-CM

## 2022-04-27 DIAGNOSIS — J45.21 EXACERBATION OF INTERMITTENT ASTHMA, UNSPECIFIED ASTHMA SEVERITY: ICD-10-CM

## 2022-04-27 PROCEDURE — 99213 OFFICE O/P EST LOW 20 MIN: CPT | Performed by: PHYSICIAN ASSISTANT

## 2022-04-27 RX ORDER — ALBUTEROL SULFATE 90 UG/1
2 AEROSOL, METERED RESPIRATORY (INHALATION) EVERY 6 HOURS PRN
Qty: 8.5 G | Refills: 1 | Status: SHIPPED | OUTPATIENT
Start: 2022-04-27 | End: 2022-09-25

## 2022-04-27 RX ORDER — CEPHALEXIN 500 MG/1
500 CAPSULE ORAL 4 TIMES DAILY
Qty: 28 CAPSULE | Refills: 0 | Status: SHIPPED | OUTPATIENT
Start: 2022-04-27 | End: 2022-05-16

## 2022-04-27 NOTE — PROGRESS NOTES
"cc:  Breast pain    Subjective:     Niki Riley is a 21 y.o. female presenting for breast pain      Patient presents to the office for breast pain.  She has been taking the abx and has trying to avoid caffeine as much as possibles.  She has used the warm compresses a few times.  She states that it did help with the pain but it is still sore.  She states that the size has decreased.  It is not hot to touch.  She states that there is a 40 to 50 % improvement.      Patient requesting refill of her albuterol inhaler.  She states that she uses intermittently but uses it more frequently and spring and fall months.  She is currently moving and has been using the inhaler more.  Last time she filled an inhaler was a year ago.    Review of systems:  See above.   Denies any symptoms unless previously indicated.        Current Outpatient Medications:   •  cephALEXin (KEFLEX) 500 MG Cap, Take 1 Capsule by mouth 4 times a day., Disp: 28 Capsule, Rfl: 0  •  albuterol 108 (90 Base) MCG/ACT Aero Soln inhalation aerosol, Inhale 2 Puffs every 6 hours as needed for Shortness of Breath., Disp: 8.5 g, Rfl: 1  •  hydrOXYzine HCl (ATARAX) 10 MG Tab, TAKE 1 TO 2 TABLETS BY MOUTH 4 TIMES DAILY FOR 3 DAYS, Disp: 50 Tablet, Rfl: 1  •  levonorgestrel (MIRENA) 52 mg (20 mcg/24 hr) IUD, 1 Each by Intrauterine route Once., Disp: , Rfl:     Allergies, past medical history, past surgical history, family history, social history reviewed and updated    Objective:     Vitals: /68 (BP Location: Left arm, Patient Position: Sitting, BP Cuff Size: Adult)   Pulse 95   Temp 36.9 °C (98.5 °F) (Temporal)   Resp 14   Ht 1.676 m (5' 6\")   Wt 51.4 kg (113 lb 6.4 oz)   SpO2 95%   BMI 18.30 kg/m²   General: Alert, pleasant, NAD  EYES:   PERRL, EOMI, no icterus or pallor.  Conjunctivae and lids normal.   HENT:  Normocephalic.  External ears normal.  Neck supple.   Respiratory: Normal respiratory effort.   Breast:  Lump still palpable but " decreased from previous exam.  Not hot. More plyable.  Pain with pressure.     Abdomen: Not obese  Skin: Warm, dry, no rashes.  Musculoskeletal: Gait is normal.  Moves all extremities well.    Extremities: normal range of motion all extremities.   Neurological: No tremors, sensation grossly intact, CN2-12 intact.  Psych:  Affect/mood is normal, judgement is good, memory is intact, grooming is appropriate.    Assessment/Plan:     Niki was seen today for breast problem.    Diagnoses and all orders for this visit:    Breast lump  -     cephALEXin (KEFLEX) 500 MG Cap; Take 1 Capsule by mouth 4 times a day.    Improved but not resolved.  Will increase the dose of cephalexin and patient will continue to attempt to make an appointment with radiology.      Exacerbation of intermittent asthma, unspecified asthma severity  -     albuterol 108 (90 Base) MCG/ACT Aero Soln inhalation aerosol; Inhale 2 Puffs every 6 hours as needed for Shortness of Breath.      We will refill medication at this time.      Return in about 2 weeks (around 5/11/2022), or if symptoms worsen or fail to improve, for 1-2 weeks.    Please note that this dictation was created using voice recognition software. I have made every reasonable attempt to correct obvious errors, but expect that there are errors of grammar and possible content that I did not discover before finalizing note.

## 2022-04-28 ENCOUNTER — TELEPHONE (OUTPATIENT)
Dept: MEDICAL GROUP | Facility: CLINIC | Age: 22
End: 2022-04-28
Payer: MEDICAID

## 2022-04-28 NOTE — TELEPHONE ENCOUNTER
Where does she want it done?  If it is [facility], okay to refax.  If [facility], it is in and we need to contact radiology to find out what is needed.

## 2022-04-28 NOTE — TELEPHONE ENCOUNTER
VOICEMAIL  1. Caller Name: Niki Riley                      Call Back Number: [phone]    2. Message: Pt called and stated that she has been trying to get scheduled for a radiology appointment, she has called the referrals department and they stated the referral has not been put through. Pt requesting the referral be sent again.    3. Patient approves office to leave a detailed voicemail/MyChart message: yes

## 2022-04-29 NOTE — TELEPHONE ENCOUNTER
Phone Number Called: [phone] (home)       Call outcome: Spoke to patient regarding message below.    Message: Spoke to pt, pt stated she would like it sent to [facility]. Sent.

## 2022-05-10 ENCOUNTER — TELEPHONE (OUTPATIENT)
Dept: MEDICAL GROUP | Facility: CLINIC | Age: 22
End: 2022-05-10
Payer: MEDICAID

## 2022-05-10 NOTE — TELEPHONE ENCOUNTER
Phone Number Called: **[phone] (home)   *    Call outcome: Spoke to patient regarding message below.    Message: Pt came into clinic to reschedule. Pt scheduled appt for next week. Pt needs to know: she is getting a Zio Patch tomorrow, is she able to go to her breast imaging appt if she is wearing the Zio Patch.

## 2022-05-10 NOTE — TELEPHONE ENCOUNTER
Phone Number Called: [phone] (home)       Call outcome: Spoke to patient regarding message below.    Message: Notified pt to contact radiology. Gave number to [facility].

## 2022-05-11 NOTE — TELEPHONE ENCOUNTER
VOICEMAIL  1. Caller Name: Shreya from [facility]                       Call Back Number: [phone]    2. Message: [facility] left voicemail asking for clarification on the breast imaging order for pt. They need more specific location for the lump. They have R breast, they need to know is it upper, lower, outer, inner portion of the R breast? For billing purposes.    3. Patient approves office to leave a detailed voicemail/MyChart message: yes

## 2022-05-16 ENCOUNTER — OFFICE VISIT (OUTPATIENT)
Dept: MEDICAL GROUP | Facility: CLINIC | Age: 22
End: 2022-05-16
Payer: MEDICAID

## 2022-05-16 VITALS
OXYGEN SATURATION: 100 % | HEIGHT: 67 IN | BODY MASS INDEX: 18.21 KG/M2 | RESPIRATION RATE: 20 BRPM | TEMPERATURE: 99.8 F | HEART RATE: 95 BPM | SYSTOLIC BLOOD PRESSURE: 90 MMHG | WEIGHT: 116 LBS | DIASTOLIC BLOOD PRESSURE: 60 MMHG

## 2022-05-16 DIAGNOSIS — N64.4 BREAST PAIN: ICD-10-CM

## 2022-05-16 DIAGNOSIS — N63.0 BREAST LUMP: ICD-10-CM

## 2022-05-16 LAB
INT CON NEG: NEGATIVE
INT CON POS: POSITIVE
POC URINE PREGNANCY TEST: NEGATIVE

## 2022-05-16 PROCEDURE — 81025 URINE PREGNANCY TEST: CPT | Performed by: PHYSICIAN ASSISTANT

## 2022-05-16 PROCEDURE — 99213 OFFICE O/P EST LOW 20 MIN: CPT | Performed by: PHYSICIAN ASSISTANT

## 2022-05-16 RX ORDER — CEPHALEXIN 500 MG/1
1 CAPSULE ORAL 4 TIMES DAILY
COMMUNITY
Start: 2022-04-27 | End: 2022-05-16

## 2022-05-16 RX ORDER — CEPHALEXIN 250 MG/1
CAPSULE ORAL
Qty: 320 CAPSULE | Refills: 0 | Status: SHIPPED | OUTPATIENT
Start: 2022-05-16 | End: 2022-05-16 | Stop reason: SDUPTHER

## 2022-05-16 RX ORDER — CEPHALEXIN 250 MG/1
CAPSULE ORAL
Qty: 80 CAPSULE | Refills: 0 | Status: SHIPPED | OUTPATIENT
Start: 2022-05-16 | End: 2022-09-25

## 2022-05-16 NOTE — PROGRESS NOTES
"cc:  Breast pain    Subjective:     Niki Riley is a 22 y.o. female presenting for breast pain      Patient presents to the office for breast pain.  Patient states that she was not able to do a second round of abx as she has a hard time swallowing pills.  She states that the swelling in the right breast is returning and is having swelling in the left breast. She has an appointment tomorrow with [facility]. She is trying to cut back on caffeine and can no longer use warm compresses due to the sensitivity of her breasts.      Review of systems:  See above.   Denies any symptoms unless previously indicated.        Current Outpatient Medications:   •  cephALEXin (KEFLEX) 250 MG Cap, Take 2 tabs four times a day for 10 days., Disp: 320 Capsule, Rfl: 0  •  albuterol 108 (90 Base) MCG/ACT Aero Soln inhalation aerosol, Inhale 2 Puffs every 6 hours as needed for Shortness of Breath., Disp: 8.5 g, Rfl: 1  •  levonorgestrel (MIRENA) 52 mg (20 mcg/24 hr) IUD, 1 Each by Intrauterine route Once., Disp: , Rfl:   •  hydrOXYzine HCl (ATARAX) 10 MG Tab, TAKE 1 TO 2 TABLETS BY MOUTH 4 TIMES DAILY FOR 3 DAYS (Patient not taking: Reported on 5/16/2022), Disp: 50 Tablet, Rfl: 1    Allergies, past medical history, past surgical history, family history, social history reviewed and updated    Objective:     Vitals: BP (!) 90/60 (BP Location: Left arm, Patient Position: Sitting, BP Cuff Size: Adult)   Pulse 95   Temp 37.7 °C (99.8 °F) (Temporal)   Resp 20   Ht 1.689 m (5' 6.5\")   Wt 52.6 kg (116 lb)   SpO2 100%   BMI 18.44 kg/m²   General: Alert, pleasant, NAD  EYES:   PERRL, EOMI, no icterus or pallor.  Conjunctivae and lids normal.   HENT:  Normocephalic.  External ears normal.  Neck supple.  Respiratory: Normal respiratory effort.   Breast:  Tenderness and swelling outer left breast 12 to 6 o'clock position   Abdomen: Not obese.  Skin: Warm, dry, no rashes.  Musculoskeletal: Gait is normal.  Moves all " extremities well.    Extremities: normal range of motion all extremities.   Neurological: No tremors, sensation grossly intact,  CN2-12 intact.  Psych:  Affect/mood is normal, judgement is good, memory is intact, grooming is appropriate.    Assessment/Plan:     Niki was seen today for follow-up.    Diagnoses and all orders for this visit:    Breast lump  -     cephALEXin (KEFLEX) 250 MG Cap; Take 2 tabs four times a day for 10 days.  -     US-BREAST LIMITED-LEFT; Future    Breast pain  -     cephALEXin (KEFLEX) 250 MG Cap; Take 2 tabs four times a day for 10 days.  -     US-BREAST LIMITED-LEFT; Future  -     POCT Pregnancy      Right breast ultrasound ordered and scheduled for tomorrow.  Will order a left breast ultrasound.  Follow up with test results.  Unable to take larger cephalexin dose and so this was decreased to 250 mg 2 pills 4 times a day for 10 days.  Follow-up in 1 week with test results she will contact us sooner with any problems.      No follow-ups on file.    Please note that this dictation was created using voice recognition software. I have made every reasonable attempt to correct obvious errors, but expect that there are errors of grammar and possible content that I did not discover before finalizing note.

## 2022-09-15 ENCOUNTER — OFFICE VISIT (OUTPATIENT)
Dept: MEDICAL GROUP | Facility: CLINIC | Age: 22
End: 2022-09-15
Payer: MEDICAID

## 2022-09-15 VITALS
HEIGHT: 67 IN | HEART RATE: 80 BPM | SYSTOLIC BLOOD PRESSURE: 98 MMHG | TEMPERATURE: 99 F | BODY MASS INDEX: 18.52 KG/M2 | DIASTOLIC BLOOD PRESSURE: 64 MMHG | OXYGEN SATURATION: 98 % | RESPIRATION RATE: 18 BRPM | WEIGHT: 118 LBS

## 2022-09-15 DIAGNOSIS — T83.9XXA COMPLICATION OF INTRAUTERINE DEVICE (IUD), UNSPECIFIED COMPLICATION, INITIAL ENCOUNTER (HCC): ICD-10-CM

## 2022-09-15 PROCEDURE — 99213 OFFICE O/P EST LOW 20 MIN: CPT | Performed by: PHYSICIAN ASSISTANT

## 2022-09-16 NOTE — PROGRESS NOTES
"cc:  IUD    Subjective:     Niki Riley is a 22 y.o. female presenting for IUD      Patient presents to the office for IUD.  Patient states that she has not been able to find her IUD strings.  She would like to have this further evaluated.  She states that that she has not checked in the last 2 months but she has checked several times since and has not been able to find the strings.      Review of systems:  See above.   Denies any symptoms unless previously indicated.        Current Outpatient Medications:     albuterol 108 (90 Base) MCG/ACT Aero Soln inhalation aerosol, Inhale 2 Puffs every 6 hours as needed for Shortness of Breath., Disp: 8.5 g, Rfl: 1    levonorgestrel (MIRENA) 52 mg (20 mcg/24 hr) IUD, 1 Each by Intrauterine route Once., Disp: , Rfl:     cephALEXin (KEFLEX) 250 MG Cap, Take 2 tabs four times a day for 10 days. (Patient not taking: Reported on 9/15/2022), Disp: 80 Capsule, Rfl: 0    hydrOXYzine HCl (ATARAX) 10 MG Tab, TAKE 1 TO 2 TABLETS BY MOUTH 4 TIMES DAILY FOR 3 DAYS (Patient not taking: No sig reported), Disp: 50 Tablet, Rfl: 1    Allergies, past medical history, past surgical history, family history, social history reviewed and updated    Objective:     Vitals: BP (!) 98/64 (BP Location: Left arm, Patient Position: Sitting, BP Cuff Size: Small adult)   Pulse 80   Temp 37.2 °C (99 °F) (Temporal)   Resp 18   Ht 1.689 m (5' 6.5\")   Wt 53.5 kg (118 lb)   SpO2 98%   BMI 18.76 kg/m²   General: Alert, pleasant, NAD  EYES:   PERRL, EOMI, no icterus or pallor.  Conjunctivae and lids normal.   HENT:  Normocephalic.  External ears normal.   Neck supple.   Respiratory: Normal respiratory effort.    Abdomen: Not obese  Skin: Warm, dry, no rashes.  Musculoskeletal: Gait is normal.  Moves all extremities well.    Extremities: normal range of motion all extremities.   Neurological: No tremors, sensation grossly intact,  CN2-12 intact.  Psych:  Affect/mood is normal, judgement is good, " memory is intact, grooming is appropriate.    Assessment/Plan:     Niki was seen today for referral needed.    Diagnoses and all orders for this visit:    Complication of intrauterine device (IUD), unspecified complication, initial encounter (HCC)  -     Referral to Gynecology      Concerned that IUD may have dislodged.  Patient is currently menstruating so unable to do exam at this time.  We will submit a referral to gynecology.  Depending on when she is able to obtain an appointment, if we do need to do a pelvic exam to evaluate further I am happy to do so.    No follow-ups on file.    Please note that this dictation was created using voice recognition software. I have made every reasonable attempt to correct obvious errors, but expect that there are errors of grammar and possible content that I did not discover before finalizing note.

## 2022-09-25 ENCOUNTER — OFFICE VISIT (OUTPATIENT)
Dept: URGENT CARE | Facility: PHYSICIAN GROUP | Age: 22
End: 2022-09-25
Payer: MEDICAID

## 2022-09-25 VITALS
RESPIRATION RATE: 14 BRPM | WEIGHT: 114 LBS | BODY MASS INDEX: 17.28 KG/M2 | SYSTOLIC BLOOD PRESSURE: 98 MMHG | DIASTOLIC BLOOD PRESSURE: 64 MMHG | OXYGEN SATURATION: 99 % | TEMPERATURE: 97.9 F | HEART RATE: 62 BPM | HEIGHT: 68 IN

## 2022-09-25 DIAGNOSIS — R10.13 EPIGASTRIC DISCOMFORT: ICD-10-CM

## 2022-09-25 DIAGNOSIS — R07.89 CHEST DISCOMFORT: ICD-10-CM

## 2022-09-25 PROCEDURE — 99214 OFFICE O/P EST MOD 30 MIN: CPT | Performed by: FAMILY MEDICINE

## 2022-09-25 PROCEDURE — 93000 ELECTROCARDIOGRAM COMPLETE: CPT | Performed by: FAMILY MEDICINE

## 2022-09-25 RX ORDER — PANTOPRAZOLE SODIUM 40 MG/1
TABLET, DELAYED RELEASE ORAL
Qty: 30 TABLET | Refills: 0 | Status: SHIPPED | OUTPATIENT
Start: 2022-09-25 | End: 2023-02-25

## 2022-09-25 NOTE — PROGRESS NOTES
Chief Complaint:    Chief Complaint   Patient presents with    Abdominal Pain     Upper gastric pain, chest pressure, heart palpitations x2 days       History of Present Illness:    Symptoms started noon - 1 PM yesterday after eating. Had chest discomfort, felt burning. Currently in exam room, no chest discomfort, but she did have chest discomfort earlier today. Has history of 2 holes in her heart, she reports she saw Cardiology earlier this year, wore what sounds like a Holter monitor for 2 weeks, and was told she was OK. In exam room, she has mild epigastric discomfort, feels like pressure. She has history of anxiety and palpitations, but reports recent symptoms feel different.      Past Medical History:    Past Medical History:   Diagnosis Date    Asthma     Migraine     Shoulder pain 8/8/2014    Urinary tract infection      Past Surgical History:    Past Surgical History:   Procedure Laterality Date    PELVISCOPY  9/5/2018    Procedure: PELVISCOPY-  OVARIAN CYSTECTOMY;  Surgeon: Katlheen Chen M.D.;  Location: SURGERY SAME DAY [facility];  Service: Gynecology    GASTROSCOPY  5/24/2014    Performed by Luis Felipe Decker M.D. at SURGERY [facility]     Social History:    Social History     Socioeconomic History    Marital status: Legally      Spouse name: Not on file    Number of children: Not on file    Years of education: Not on file    Highest education level: Not on file   Occupational History    Not on file   Tobacco Use    Smoking status: Never    Smokeless tobacco: Never    Tobacco comments:     vapes   Vaping Use    Vaping Use: Every day    Substances: Nicotine, Flavoring   Substance and Sexual Activity    Alcohol use: No     Alcohol/week: 0.0 oz    Drug use: No    Sexual activity: Yes     Partners: Male     Birth control/protection: I.U.D.   Other Topics Concern    Behavioral problems Not Asked    Interpersonal relationships Not Asked    Sad or not enjoying activities Not Asked    Suicidal  "thoughts Not Asked    Poor school performance Not Asked    Reading difficulties Not Asked    Speech difficulties Not Asked    Writing difficulties Not Asked    Inadequate sleep Not Asked    Excessive TV viewing Not Asked    Excessive video game use Not Asked    Inadequate exercise Not Asked    Sports related Not Asked    Poor diet Not Asked    Family concerns for drug/alcohol abuse Not Asked    Poor oral hygiene Not Asked    Bike safety Not Asked    Family concerns vehicle safety Not Asked   Social History Narrative    Not on file     Social Determinants of Health     Financial Resource Strain: Not on file   Food Insecurity: Not on file   Transportation Needs: Not on file   Physical Activity: Not on file   Stress: Not on file   Social Connections: Not on file   Intimate Partner Violence: Not on file   Housing Stability: Not on file     Family History:    Family History   Problem Relation Age of Onset    Anxiety disorder Mother     Heart Disease Maternal Grandfather      Medications:    No current outpatient medications on file prior to visit.     No current facility-administered medications on file prior to visit.      Allergies:    Allergies   Allergen Reactions    Sulfa Drugs     Honey Dew     Kiwi Extract Anaphylaxis     Clarified by dietary staff       Vitals:    Vitals:    09/25/22 1237   BP: (!) 98/64   BP Location: Right arm   Patient Position: Sitting   BP Cuff Size: Adult   Pulse: 62   Resp: 14   Temp: 36.6 °C (97.9 °F)   TempSrc: Temporal   SpO2: 99%   Weight: 51.7 kg (114 lb)   Height: 1.727 m (5' 8\")       Physical Exam:    Constitutional: Vital signs reviewed. Appears well-developed and well-nourished. No acute distress.   Eyes: Sclera white, conjunctivae clear.   ENT: External ears normal. Hearing normal.   Neck: Neck supple.   Cardiovascular: Regular rate and rhythm. No murmur.  Pulmonary/Chest: Respirations non-labored. Clear to auscultation bilaterally.  Abdomen: Mild pressure discomfort on " "palpation of epigastric region. Bowel sounds are normal active. Soft, non-distended, and otherwise non-tender to palpation.  Musculoskeletal: Normal gait. No muscular atrophy or weakness.  Neurological: Alert and oriented to person, place, and time. Muscle tone normal. Coordination normal.   Skin: No rashes or lesions. Warm, dry, normal turgor.  Psychiatric: Normal mood and affect. Behavior is normal. Judgment and thought content normal.       Diagnostics:    EKG x 2: Sinus rhythm 73 and 74. Normal ECG.    EKGs reviewed with her. As they were essentially the same, one given to her.      Medical Decision Makin. Chest discomfort  - EKG  - pantoprazole (PROTONIX) 40 MG Tablet Delayed Response; 1 TAB BY MOUTH ONCE A DAY TO REDUCE ACID STOMACH.  Dispense: 30 Tablet; Refill: 0    2. Epigastric discomfort  - EKG  - pantoprazole (PROTONIX) 40 MG Tablet Delayed Response; 1 TAB BY MOUTH ONCE A DAY TO REDUCE ACID STOMACH.  Dispense: 30 Tablet; Refill: 0       Discussed with her DDX, management options, and risks, benefits, and alternatives to treatment plan agreed upon.    Symptoms started noon - 1 PM yesterday after eating. Had chest discomfort, felt burning. Currently in exam room, no chest discomfort, but she did have chest discomfort earlier today. Has history of 2 holes in her heart, she reports she saw Cardiology earlier this year, wore what sounds like a Holter monitor for 2 weeks, and was told she was OK. In exam room, she has mild epigastric discomfort, feels like pressure. She has history of anxiety and palpitations, but reports recent symptoms feel different.    Mild pressure discomfort on palpation of epigastric region.     EKG x 2: Sinus rhythm 73 and 74. Normal ECG.    EKGs reviewed with her. As they were essentially the same, one given to her.    ? etiology of symptoms.     Agreeable to GI cocktail given in clinic. She felt this helped her epigastric discomfort although it did cause some \"bubbling\" in her " abdomen afterwards.    Since GI cocktail helped epigastric discomfort, offered her PPI medication treatment. She is agreeable.    Agreeable to medication prescribed.    Discussed expected course of duration, time for improvement, and to seek follow-up in Emergency Room, urgent care, or with PCP if getting worse at any time or not improving within expected time frame.

## 2022-10-04 ENCOUNTER — TELEPHONE (OUTPATIENT)
Dept: MEDICAL GROUP | Facility: CLINIC | Age: 22
End: 2022-10-04
Payer: MEDICAID

## 2022-12-13 ENCOUNTER — APPOINTMENT (OUTPATIENT)
Dept: URGENT CARE | Facility: PHYSICIAN GROUP | Age: 22
End: 2022-12-13
Payer: MEDICAID

## 2023-02-25 ENCOUNTER — OFFICE VISIT (OUTPATIENT)
Dept: URGENT CARE | Facility: PHYSICIAN GROUP | Age: 23
End: 2023-02-25
Payer: MEDICAID

## 2023-02-25 VITALS
WEIGHT: 123 LBS | RESPIRATION RATE: 16 BRPM | BODY MASS INDEX: 18.7 KG/M2 | TEMPERATURE: 98.1 F | DIASTOLIC BLOOD PRESSURE: 68 MMHG | OXYGEN SATURATION: 97 % | SYSTOLIC BLOOD PRESSURE: 108 MMHG | HEART RATE: 116 BPM

## 2023-02-25 DIAGNOSIS — R10.9 ABDOMINAL CRAMPING: ICD-10-CM

## 2023-02-25 DIAGNOSIS — R11.2 NAUSEA AND VOMITING, UNSPECIFIED VOMITING TYPE: ICD-10-CM

## 2023-02-25 DIAGNOSIS — K52.9 AGE (ACUTE GASTROENTERITIS): ICD-10-CM

## 2023-02-25 PROCEDURE — 99214 OFFICE O/P EST MOD 30 MIN: CPT | Performed by: NURSE PRACTITIONER

## 2023-02-25 RX ORDER — DICYCLOMINE HCL 20 MG
20 TABLET ORAL EVERY 6 HOURS
Qty: 120 TABLET | Refills: 0 | Status: SHIPPED | OUTPATIENT
Start: 2023-02-25 | End: 2024-03-11

## 2023-02-25 RX ORDER — ONDANSETRON 4 MG/1
4 TABLET, ORALLY DISINTEGRATING ORAL EVERY 8 HOURS PRN
Qty: 10 TABLET | Refills: 0 | Status: SHIPPED | OUTPATIENT
Start: 2023-02-25 | End: 2023-09-25

## 2023-02-28 ASSESSMENT — ENCOUNTER SYMPTOMS
SHORTNESS OF BREATH: 0
MYALGIAS: 0
BLOOD IN STOOL: 0
SORE THROAT: 0
CHILLS: 0
ROS GI COMMENTS: 1
FEVER: 0
ABDOMINAL PAIN: 1
COUGH: 0
VOMITING: 1
HEARTBURN: 0
FATIGUE: 1
CHANGE IN BOWEL HABIT: 1
JOINT SWELLING: 0
DIARRHEA: 1
CONSTIPATION: 0
EYE PAIN: 0
DIZZINESS: 0
NAUSEA: 0

## 2023-02-28 NOTE — PROGRESS NOTES
Subjective:   Niki Riley is a 22 y.o. female who presents for Nausea (X2weeks ), Diarrhea, and Emesis (/)      GI Problem  This is a new problem. The current episode started 1 to 4 weeks ago. The problem occurs constantly. The problem has been waxing and waning. Associated symptoms include abdominal pain, a change in bowel habit, fatigue and vomiting. Pertinent negatives include no chest pain, chills, congestion, coughing, fever, joint swelling, myalgias, nausea, rash, sore throat or urinary symptoms. The symptoms are aggravated by eating. She has tried drinking for the symptoms. The treatment provided no relief.     Review of Systems   Constitutional:  Positive for fatigue. Negative for chills and fever.   HENT:  Negative for congestion and sore throat.    Eyes:  Negative for pain.   Respiratory:  Negative for cough and shortness of breath.    Cardiovascular:  Negative for chest pain.   Gastrointestinal:  Positive for abdominal pain, change in bowel habit, diarrhea and vomiting. Negative for blood in stool, constipation, heartburn, melena and nausea.        1   Genitourinary:  Negative for hematuria.   Musculoskeletal:  Negative for joint swelling and myalgias.   Skin:  Negative for rash.   Neurological:  Negative for dizziness.     Medications:    dicyclomine Tabs  ondansetron Tbdp    Allergies: Sulfa drugs, Honey dew, and Kiwi extract    Problem List: Niki Riley does not have any pertinent problems on file.    Surgical History:  Past Surgical History:   Procedure Laterality Date    PELVISCOPY  9/5/2018    Procedure: PELVISCOPY-  OVARIAN CYSTECTOMY;  Surgeon: Kathleen Chen M.D.;  Location: SURGERY SAME DAY [facility];  Service: Gynecology    GASTROSCOPY  5/24/2014    Performed by Luis Felipe Decker M.D. at SURGERY [facility]       Past Social Hx: Niki Riley  reports that she has never smoked. She has never used smokeless tobacco. She reports that she does not  drink alcohol and does not use drugs.     Past Family Hx:  Niki Riley family history includes Anxiety disorder in her mother; Heart Disease in her maternal grandfather.     Problem list, medications, and allergies reviewed by myself today in Epic.     Objective:     /68   Pulse (!) 116   Temp 36.7 °C (98.1 °F) (Temporal)   Resp 16   Wt 55.8 kg (123 lb)   SpO2 97%   BMI 18.70 kg/m²     Physical Exam  Vitals and nursing note reviewed.   Constitutional:       General: She is not in acute distress.     Appearance: She is well-developed.   HENT:      Head: Normocephalic and atraumatic.      Right Ear: Tympanic membrane and external ear normal.      Left Ear: Tympanic membrane and external ear normal.      Nose: Nose normal.      Right Sinus: No maxillary sinus tenderness or frontal sinus tenderness.      Left Sinus: No maxillary sinus tenderness or frontal sinus tenderness.      Mouth/Throat:      Mouth: Mucous membranes are moist.      Pharynx: Uvula midline. No posterior oropharyngeal erythema.      Tonsils: No tonsillar exudate or tonsillar abscesses.   Eyes:      General:         Right eye: No discharge.         Left eye: No discharge.      Conjunctiva/sclera: Conjunctivae normal.   Cardiovascular:      Rate and Rhythm: Normal rate.   Pulmonary:      Effort: Pulmonary effort is normal. No respiratory distress.      Breath sounds: Normal breath sounds.   Abdominal:      General: Bowel sounds are increased. There is no distension.      Tenderness: There is no abdominal tenderness. There is no right CVA tenderness, left CVA tenderness, guarding or rebound. Negative signs include Manzanares's sign, Rovsing's sign, McBurney's sign, psoas sign and obturator sign.      Hernia: No hernia is present.   Musculoskeletal:         General: Normal range of motion.   Skin:     General: Skin is warm and dry.   Neurological:      General: No focal deficit present.      Mental Status: She is alert and oriented  to person, place, and time. Mental status is at baseline.      Gait: Gait (gait at baseline) normal.   Psychiatric:         Judgment: Judgment normal.       Assessment/Plan:     Diagnosis and associated orders:     1. AGE (acute gastroenteritis)        2. Nausea and vomiting, unspecified vomiting type  ondansetron (ZOFRAN ODT) 4 MG TABLET DISPERSIBLE      3. Abdominal cramping  dicyclomine (BENTYL) 20 MG Tab         Comments/MDM:   Acute illness with systemic symptoms  I personally reviewed prior external notes and prior test results pertinent to today's visit.  Patient with no acute abdominal tenderness, no rebound tenderness or guarding, increased bowel sounds, afebrile discussed symptoms likely viral versus inflammatory encouraged to increase fluids and electrolytes.  Discussed management options, risks and benefits, and alternatives to treatment plan agreed upon.   Red flags discussed and indications to immediately call 911 or present to the Emergency Department.   Supportive care, differential diagnoses, and indications for immediate follow-up discussed with patient.    Patient expresses understanding and agrees to plan. Patient denies any other questions or concerns.              Please note that this dictation was created using voice recognition software. I have made a reasonable attempt to correct obvious errors, but I expect that there are errors of grammar and possibly content that I did not discover before finalizing the note.    This note was electronically signed by Lamine RICHARDSON.     Counseling regarding goals of care Acute on chronic respiratory failure

## 2023-03-09 ENCOUNTER — TELEPHONE (OUTPATIENT)
Dept: MEDICAL GROUP | Facility: CLINIC | Age: 23
End: 2023-03-09
Payer: MEDICAID

## 2023-05-24 ENCOUNTER — OFFICE VISIT (OUTPATIENT)
Dept: MEDICAL GROUP | Facility: CLINIC | Age: 23
End: 2023-05-24
Payer: MEDICAID

## 2023-05-24 VITALS
DIASTOLIC BLOOD PRESSURE: 70 MMHG | WEIGHT: 131 LBS | BODY MASS INDEX: 20.56 KG/M2 | TEMPERATURE: 98.6 F | RESPIRATION RATE: 16 BRPM | SYSTOLIC BLOOD PRESSURE: 114 MMHG | HEIGHT: 67 IN | OXYGEN SATURATION: 99 % | HEART RATE: 84 BPM

## 2023-05-24 DIAGNOSIS — Z97.5 IUD (INTRAUTERINE DEVICE) IN PLACE: ICD-10-CM

## 2023-05-24 DIAGNOSIS — R45.4 ANGER REACTION: ICD-10-CM

## 2023-05-24 DIAGNOSIS — M54.50 CHRONIC MIDLINE LOW BACK PAIN WITHOUT SCIATICA: ICD-10-CM

## 2023-05-24 DIAGNOSIS — L30.9 ECZEMA, UNSPECIFIED TYPE: ICD-10-CM

## 2023-05-24 DIAGNOSIS — F41.9 ANXIETY: ICD-10-CM

## 2023-05-24 DIAGNOSIS — G89.29 CHRONIC MIDLINE LOW BACK PAIN WITHOUT SCIATICA: ICD-10-CM

## 2023-05-24 DIAGNOSIS — F43.10 PTSD (POST-TRAUMATIC STRESS DISORDER): ICD-10-CM

## 2023-05-24 DIAGNOSIS — F33.0 MILD EPISODE OF RECURRENT MAJOR DEPRESSIVE DISORDER (HCC): ICD-10-CM

## 2023-05-24 DIAGNOSIS — J30.2 SEASONAL ALLERGIES: ICD-10-CM

## 2023-05-24 PROCEDURE — 99214 OFFICE O/P EST MOD 30 MIN: CPT | Performed by: PHYSICIAN ASSISTANT

## 2023-05-24 PROCEDURE — 3074F SYST BP LT 130 MM HG: CPT | Performed by: PHYSICIAN ASSISTANT

## 2023-05-24 PROCEDURE — 3078F DIAST BP <80 MM HG: CPT | Performed by: PHYSICIAN ASSISTANT

## 2023-05-24 RX ORDER — LORATADINE 10 MG/1
10 TABLET ORAL DAILY
Qty: 90 TABLET | Refills: 2 | Status: SHIPPED | OUTPATIENT
Start: 2023-05-24 | End: 2024-01-16 | Stop reason: SDUPTHER

## 2023-05-24 RX ORDER — TRIAMCINOLONE ACETONIDE 1 MG/G
CREAM TOPICAL
Qty: 60 G | Refills: 1 | Status: SHIPPED | OUTPATIENT
Start: 2023-05-24 | End: 2023-09-25

## 2023-05-24 RX ORDER — SODIUM CHLORIDE 9 MG/ML
INJECTION, SOLUTION INTRAVENOUS
COMMUNITY
Start: 2023-02-27 | End: 2023-05-24

## 2023-05-24 RX ORDER — ONDANSETRON 4 MG/1
4 TABLET, ORALLY DISINTEGRATING ORAL
COMMUNITY
Start: 2023-02-27 | End: 2023-05-24

## 2023-05-24 RX ORDER — ALBUTEROL SULFATE 90 UG/1
2 AEROSOL, METERED RESPIRATORY (INHALATION) EVERY 6 HOURS PRN
COMMUNITY
End: 2024-01-16 | Stop reason: SDUPTHER

## 2023-05-24 RX ORDER — ONDANSETRON 2 MG/ML
INJECTION INTRAMUSCULAR; INTRAVENOUS
COMMUNITY
Start: 2023-02-27 | End: 2023-05-24

## 2023-05-24 ASSESSMENT — FIBROSIS 4 INDEX: FIB4 SCORE: 0.45

## 2023-05-24 ASSESSMENT — PATIENT HEALTH QUESTIONNAIRE - PHQ9
8. MOVING OR SPEAKING SO SLOWLY THAT OTHER PEOPLE COULD HAVE NOTICED. OR THE OPPOSITE, BEING SO FIGETY OR RESTLESS THAT YOU HAVE BEEN MOVING AROUND A LOT MORE THAN USUAL: MORE THAN HALF THE DAYS
4. FEELING TIRED OR HAVING LITTLE ENERGY: SEVERAL DAYS
6. FEELING BAD ABOUT YOURSELF - OR THAT YOU ARE A FAILURE OR HAVE LET YOURSELF OR YOUR FAMILY DOWN: SEVERAL DAYS
2. FEELING DOWN, DEPRESSED, IRRITABLE, OR HOPELESS: SEVERAL DAYS
3. TROUBLE FALLING OR STAYING ASLEEP OR SLEEPING TOO MUCH: SEVERAL DAYS
SUM OF ALL RESPONSES TO PHQ9 QUESTIONS 1 AND 2: 3
SUM OF ALL RESPONSES TO PHQ QUESTIONS 1-9: 11
7. TROUBLE CONCENTRATING ON THINGS, SUCH AS READING THE NEWSPAPER OR WATCHING TELEVISION: SEVERAL DAYS
5. POOR APPETITE OR OVEREATING: MORE THAN HALF THE DAYS
1. LITTLE INTEREST OR PLEASURE IN DOING THINGS: MORE THAN HALF THE DAYS
9. THOUGHTS THAT YOU WOULD BE BETTER OFF DEAD, OR OF HURTING YOURSELF: NOT AT ALL

## 2023-05-24 NOTE — PROGRESS NOTES
cc: Referral    Subjective:     Niki Riley is a 23 y.o. female presenting for referral    Patient presents to the office for referral.  Patient is requesting referral to behavioral health.  Patient is needing a referral for therapy.  Patient states that she is in [place].  She has been trying to see a specialist she was referred to but has not been able to get through by phone to make an appointment.      Patient is requesting referral to gynecology.   She currently has an IUD and is needing this removed and new IUD placed.      Patient has been having increased back pain.  She states that she has been told that she has scoliosis. Mom has scoliosis.  She states that it has been pinching and locking up.      Patient does have seasonal allergies and is needing a refill of loratadine as this helps to keep her allergies controlled.  She is also complaining of eczema on the back of her neck at her hairline which is better controlled with the steroid cream.    Review of systems:  See above.   Denies any symptoms unless previously indicated.        Current Outpatient Medications:     albuterol 108 (90 Base) MCG/ACT Aero Soln inhalation aerosol, Inhale 2 Puffs every 6 hours as needed for Shortness of Breath., Disp: , Rfl:     diclofenac sodium (VOLTAREN) 1 % Gel, Apply 2 g topically 4 times a day as needed (pain)., Disp: 150 g, Rfl: 1    loratadine (CLARITIN) 10 MG Tab, Take 1 Tablet by mouth every day., Disp: 90 Tablet, Rfl: 2    triamcinolone acetonide (KENALOG) 0.1 % Cream, Apply to affected area twice a day no more than 10 days., Disp: 60 g, Rfl: 1    ondansetron (ZOFRAN ODT) 4 MG TABLET DISPERSIBLE, Take 1 Tablet by mouth every 8 hours as needed for Nausea/Vomiting., Disp: 10 Tablet, Rfl: 0    dicyclomine (BENTYL) 20 MG Tab, Take 1 Tablet by mouth every 6 hours. (Patient not taking: Reported on 5/24/2023), Disp: 120 Tablet, Rfl: 0    Allergies, past medical history, past surgical history, family history,  "social history reviewed and updated    Objective:     Vitals: /70 (BP Location: Left arm, Patient Position: Sitting, BP Cuff Size: Adult)   Pulse 84   Temp 37 °C (98.6 °F) (Temporal)   Resp 16   Ht 1.702 m (5' 7\")   Wt 59.4 kg (131 lb)   LMP 04/15/2023 (Within Days)   SpO2 99%   BMI 20.52 kg/m²   General: Alert, pleasant, NAD  EYES:   PERRL, EOMI, no icterus or pallor.  Conjunctivae and lids normal.   HENT:  Normocephalic.  Eczema hairline and through hair occipital part of the head.  External ears normal.   Neck supple.    Respiratory: Normal respiratory effort.    Abdomen: Not obese  Skin: Warm, dry, no rashes.  Musculoskeletal: Gait is normal.  Moves all extremities well.  Possible abnormal curve thoracic area of spine.  Extremities: Normal range of motion all extremities.   Neurological: No tremors, sensation grossly intact, CN2-12 intact.  Psych:  Affect/mood is normal, judgement is good, memory is intact, grooming is appropriate.    Assessment/Plan:     Niki was seen today for referral needed.    Diagnoses and all orders for this visit:    Mild episode of recurrent major depressive disorder (HCC)  -     Referral to Behavioral Health  Anxiety  -     Referral to Behavioral Health  Anger reaction  -     Referral to Behavioral Health  PTSD (post-traumatic stress disorder)  -     Referral to Behavioral Health    Referral to behavioral health submitted.    IUD (intrauterine device) in place  -     Referral to Gynecology    Referral to gynecology submitted.    Chronic midline low back pain without sciatica  -     DX-SPINE-SCOLIOSIS STUDY; Future  -     diclofenac sodium (VOLTAREN) 1 % Gel; Apply 2 g topically 4 times a day as needed (pain).    We will obtain x-rays of the spine and follow-up in 1 to 2 weeks.    Seasonal allergies  -     loratadine (CLARITIN) 10 MG Tab; Take 1 Tablet by mouth every day.  Eczema, unspecified type  -     triamcinolone acetonide (KENALOG) 0.1 % Cream; Apply to affected " area twice a day no more than 10 days.    Rx for loratadine and triamcinolone ordered at this time.  Patient advised on use of medication.        Return in about 2 weeks (around 6/7/2023), or if symptoms worsen or fail to improve, for 1-2 weeks. .    Please note that this dictation was created using voice recognition software. I have made every reasonable attempt to correct obvious errors, but expect that there are errors of grammar and possible content that I did not discover before finalizing note.

## 2023-05-25 ENCOUNTER — NON-PROVIDER VISIT (OUTPATIENT)
Dept: URGENT CARE | Facility: PHYSICIAN GROUP | Age: 23
End: 2023-05-25
Payer: MEDICAID

## 2023-05-25 ENCOUNTER — APPOINTMENT (OUTPATIENT)
Dept: RADIOLOGY | Facility: IMAGING CENTER | Age: 23
End: 2023-05-25
Attending: PHYSICIAN ASSISTANT
Payer: MEDICAID

## 2023-05-25 DIAGNOSIS — M41.125 ADOLESCENT IDIOPATHIC SCOLIOSIS OF THORACOLUMBAR REGION: ICD-10-CM

## 2023-05-25 DIAGNOSIS — G89.29 CHRONIC MIDLINE LOW BACK PAIN WITHOUT SCIATICA: ICD-10-CM

## 2023-05-25 DIAGNOSIS — M54.50 CHRONIC MIDLINE LOW BACK PAIN WITHOUT SCIATICA: ICD-10-CM

## 2023-05-25 PROCEDURE — 72081 X-RAY EXAM ENTIRE SPI 1 VW: CPT | Mod: TC,FY | Performed by: FAMILY MEDICINE

## 2023-06-07 ENCOUNTER — OFFICE VISIT (OUTPATIENT)
Dept: MEDICAL GROUP | Facility: CLINIC | Age: 23
End: 2023-06-07
Payer: MEDICAID

## 2023-06-07 VITALS
WEIGHT: 136.4 LBS | HEIGHT: 67 IN | HEART RATE: 87 BPM | OXYGEN SATURATION: 100 % | RESPIRATION RATE: 16 BRPM | BODY MASS INDEX: 21.41 KG/M2 | DIASTOLIC BLOOD PRESSURE: 78 MMHG | TEMPERATURE: 99.2 F | SYSTOLIC BLOOD PRESSURE: 110 MMHG

## 2023-06-07 DIAGNOSIS — Z23 NEED FOR VACCINATION: ICD-10-CM

## 2023-06-07 DIAGNOSIS — M41.125 ADOLESCENT IDIOPATHIC SCOLIOSIS OF THORACOLUMBAR REGION: ICD-10-CM

## 2023-06-07 PROCEDURE — 99213 OFFICE O/P EST LOW 20 MIN: CPT | Performed by: PHYSICIAN ASSISTANT

## 2023-06-07 PROCEDURE — 3078F DIAST BP <80 MM HG: CPT | Performed by: PHYSICIAN ASSISTANT

## 2023-06-07 PROCEDURE — 3074F SYST BP LT 130 MM HG: CPT | Performed by: PHYSICIAN ASSISTANT

## 2023-06-07 ASSESSMENT — FIBROSIS 4 INDEX: FIB4 SCORE: 0.45

## 2023-06-07 NOTE — PROGRESS NOTES
cc:  back    Subjective:     Niki Riley is a 23 y.o. female presenting for back      Patient presents to the office for back.  Patient presents the office today to follow-up with her most recent x-ray results. She is here to discuss further.      Patient has been trying to reach [facility] with little success.      Patient is due for vaccines but has difficulty with needles and concerns about sore arm.  Patient indicates that she does have a heart problem.  We are requesting records from [facility].     Review of systems:  See above.   Denies any symptoms unless previously indicated.        Current Outpatient Medications:     albuterol 108 (90 Base) MCG/ACT Aero Soln inhalation aerosol, Inhale 2 Puffs every 6 hours as needed for Shortness of Breath., Disp: , Rfl:     diclofenac sodium (VOLTAREN) 1 % Gel, Apply 2 g topically 4 times a day as needed (pain)., Disp: 150 g, Rfl: 1    loratadine (CLARITIN) 10 MG Tab, Take 1 Tablet by mouth every day., Disp: 90 Tablet, Rfl: 2    triamcinolone acetonide (KENALOG) 0.1 % Cream, Apply to affected area twice a day no more than 10 days., Disp: 60 g, Rfl: 1    ondansetron (ZOFRAN ODT) 4 MG TABLET DISPERSIBLE, Take 1 Tablet by mouth every 8 hours as needed for Nausea/Vomiting., Disp: 10 Tablet, Rfl: 0    dicyclomine (BENTYL) 20 MG Tab, Take 1 Tablet by mouth every 6 hours. (Patient not taking: Reported on 5/24/2023), Disp: 120 Tablet, Rfl: 0    Allergies, past medical history, past surgical history, family history, social history reviewed and updated    Narrative & Impression     5/25/2023 10:59 AM     HISTORY/REASON FOR EXAM:  concern for scoliosis.  runs in family..  Mid to low back pain     TECHNIQUE/EXAM DESCRIPTION AND NUMBER OF VIEWS:  Weight-bearing PA view(s) of the thoracolumbar spine for scoliosis evaluation.     COMPARISON:  05/29/2018     FINDINGS:  There is mild dextroscoliosis of the thoracolumbar spine centered at the T10 level with 10 degrees of  "curvature. No hemivertebra or focal vertebral deformities are identified.     IMPRESSION:     1.  Mild dextroscoliosis centered at T10 is identified which is slightly more prominent than on prior exam.    Objective:     Vitals: /78 (BP Location: Left arm, Patient Position: Sitting, BP Cuff Size: Adult)   Pulse 87   Temp 37.3 °C (99.2 °F) (Temporal)   Resp 16   Ht 1.702 m (5' 7\")   Wt 61.9 kg (136 lb 6.4 oz)   LMP 05/15/2023 (Within Days)   SpO2 100%   BMI 21.36 kg/m²   General: Alert, pleasant, NAD  EYES:   PERRL, EOMI, no icterus or pallor.  Conjunctivae and lids normal.   HENT:  Normocephalic.  External ears normal.   Neck supple.     Respiratory: Normal respiratory effort.    Abdomen: Not obese  Skin: Warm, dry, no rashes.  Musculoskeletal: Gait is normal.  Moves all extremities well.    Extremities: normal range of motion all extremities.   Neurological: No tremors, sensation grossly intact, CN2-12 intact.  Psych:  Affect/mood is normal, judgement is good, memory is intact, grooming is appropriate.    Assessment/Plan:     Niki was seen today for results.    Diagnoses and all orders for this visit:    Adolescent idiopathic scoliosis of thoracolumbar region  -     Referral to Physical Therapy    Abnormal curve measured at 10 degrees.  We will try physical therapy to help improve symptoms.  Follow-up in a year to repeat x-rays.    Need for vaccination  -     Meningococcal (IM) Group B    We will provide patient with men B vaccines at this time.  She would prefer to take her vaccines 1 at a time.  With follow-up we can discuss pneumococcal.    Will follow up in 4-12 weeks to discuss cardiology history and will hold off on men B at this time.     No follow-ups on file.    Please note that this dictation was created using voice recognition software. I have made every reasonable attempt to correct obvious errors, but expect that there are errors of grammar and possible content that I did not discover " before finalizing note.

## 2023-06-07 NOTE — LETTER
[employer]  Binta Ware P.A.-C.  [address] [address] [address]  [facility] [address]  Fax: [phone]   Authorization for Release/Disclosure of   Protected Health Information   Name: YESENIA DANIEL : 2000 SSN: [id number]   Address: [address] [address] Phone:    [phone] (home)    I authorize the entity listed below to release/disclose the PHI below to:   [employer]/Binta Ware P.A.-C[facility] and Binta Ware P.A.-C[facility]   Provider or Entity Name:  [facility].       Address   City, State, Zip   Phone:      Fax:     Reason for request: continuity of care   Information to be released:    [  ] LAST COLONOSCOPY,  including any PATH REPORT and follow-up  [  ] LAST FIT/COLOGUARD RESULT [  ] LAST DEXA  [  ] LAST MAMMOGRAM  [  ] LAST PAP  [  ] LAST LABS [  ] RETINA EXAM REPORT  [  ] IMMUNIZATION RECORDS  [  x] Release all info  cardiology      [  ] Check here and initial the line next to each item to release ALL health information INCLUDING  _____ Care and treatment for drug and / or alcohol abuse  _____ HIV testing, infection status, or AIDS  _____ Genetic Testing    DATES OF SERVICE OR TIME PERIOD TO BE DISCLOSED: _____________  I understand and acknowledge that:  * This Authorization may be revoked at any time by you in writing, except if your health information has already been used or disclosed.  * Your health information that will be used or disclosed as a result of you signing this authorization could be re-disclosed by the recipient. If this occurs, your re-disclosed health information may no longer be protected by State or Federal laws.  * You may refuse to sign this Authorization. Your refusal will not affect your ability to obtain treatment.  * This Authorization becomes effective upon signing and will  on (date) __________.      If no date is indicated, this Authorization will  one (1) year from the signature date.    Name: Yesenia Palacios  Osvaldo  Signature: Date:   6/7/2023     PLEASE FAX REQUESTED RECORDS BACK TO: [phone]

## 2023-07-06 ENCOUNTER — HOSPITAL ENCOUNTER (OUTPATIENT)
Facility: MEDICAL CENTER | Age: 23
End: 2023-07-06
Attending: OBSTETRICS & GYNECOLOGY
Payer: MEDICAID

## 2023-07-06 ENCOUNTER — GYNECOLOGY VISIT (OUTPATIENT)
Dept: OBGYN | Facility: CLINIC | Age: 23
End: 2023-07-06
Payer: MEDICAID

## 2023-07-06 VITALS — BODY MASS INDEX: 20.83 KG/M2 | WEIGHT: 133 LBS | SYSTOLIC BLOOD PRESSURE: 90 MMHG | DIASTOLIC BLOOD PRESSURE: 60 MMHG

## 2023-07-06 DIAGNOSIS — Z30.433 ENCOUNTER FOR IUD REMOVAL AND REINSERTION: Primary | ICD-10-CM

## 2023-07-06 DIAGNOSIS — Z30.433 ENCOUNTER FOR IUD REMOVAL AND REINSERTION: ICD-10-CM

## 2023-07-06 LAB
POCT INT CON NEG: NEGATIVE
POCT INT CON POS: POSITIVE
POCT URINE PREGNANCY TEST: NEGATIVE

## 2023-07-06 PROCEDURE — 87591 N.GONORRHOEAE DNA AMP PROB: CPT

## 2023-07-06 PROCEDURE — 81025 URINE PREGNANCY TEST: CPT | Performed by: OBSTETRICS & GYNECOLOGY

## 2023-07-06 PROCEDURE — 3074F SYST BP LT 130 MM HG: CPT | Performed by: OBSTETRICS & GYNECOLOGY

## 2023-07-06 PROCEDURE — 58301 REMOVE INTRAUTERINE DEVICE: CPT | Performed by: OBSTETRICS & GYNECOLOGY

## 2023-07-06 PROCEDURE — 87491 CHLMYD TRACH DNA AMP PROBE: CPT

## 2023-07-06 PROCEDURE — 58300 INSERT INTRAUTERINE DEVICE: CPT | Mod: 51 | Performed by: OBSTETRICS & GYNECOLOGY

## 2023-07-06 PROCEDURE — 88175 CYTOPATH C/V AUTO FLUID REDO: CPT

## 2023-07-06 PROCEDURE — 3078F DIAST BP <80 MM HG: CPT | Performed by: OBSTETRICS & GYNECOLOGY

## 2023-07-06 ASSESSMENT — FIBROSIS 4 INDEX: FIB4 SCORE: 0.45

## 2023-07-06 NOTE — PROGRESS NOTES
GYN FOLLOW UP VISIT    CC:  Gynecologic Exam (New pt IUD replacement)       HPI: Patient is a 23 y.o.  who presents for replacement of her Mirena IUD which she has had in place for the past 7 years. Quite happy with birth control method and wishes to have it replaced. Denies pelvic pain, foul discharge or heavy bleeding. Has occasional rare spotting.   She also has never had a pap smear.     Patient counseled regarding risks of intrauterine device replacement, including, but not limited to, bleeding, infection, perforation of uterus, malposition. Patient also counseled on long term rare risks of IUDs including, but not limited to, imbedding into other organs, migration of device into other bodily spaces, failure to prevent pregnancy or spontaneous expulsion of device. Discussed that intrauterine devices do not prevent against sexually transmitted diseases.  Possible side effects or hormonal IUDs discussed, including but not limited to, irregular and/or heavy bleeding, cessation of menstrual cycle, weight fluctuation, acne, bloating, mood changes.         ROS:   General: denies fever / chills  HEENT: denies sore throat:  CV: denies chest pain:  Repiratory: denies shortness of breath  GI: denies abdominal pain  : denies dysuria:    PFSH:  I personally reviewed the past medical and surgical histories.       PHYSICAL EXAMINATION:  Vital Signs:   Vitals:    23 1111   BP: 90/60   Weight: 133 lb     Body mass index is 20.83 kg/m².    Gen: appears well, NAD  Respiratory: normal effort  Abdomen: Soft, non-tender.    IUD Insertion Procedure Note    Pre-operative Diagnosis: Desiring long term contraception    Post-operative Diagnosis: same    Indications: undesired fertility    Procedure Details    Urine pregnancy test was negative.  The risks (including infection, bleeding, pain, and uterine perforation) and benefits of the procedure were explained to the patient and written informed consent was obtained.       Patient placed in lithotomy position. Speculum placed, cervix viewed. Pap, HPV and GC/CT collected. IUD strings grasped with ring forceps and removed with gentle traction intact.     Cervix cleansed with Betadine. Single tooth tenaculum applied to anterior lip of cervix. Uterus sounded to 7 cm, retroverted. IUD inserted without difficulty. Tenaculum removed, sites hemostatic with silver nitrate. String visible and trimmed. Patient tolerated procedure well.     IUD Information:  Mirena.    Condition:  Stable    Complications:  None    Plan:    The patient was advised to call for any fever or for prolonged or severe pain or bleeding. She was advised to use OTC ibuprofen as needed for mild to moderate pain.       ASSESSMENT AND PLAN:  23 y.o.    1. Encounter for IUD removal and reinsertion  Consent for all Surgical, Special Diagnostic or Therapeutic Procedures    POCT Pregnancy    THINPREP RFLX HPV ASCUS W/CTNG    CANCELED: POCT Urinalysis          F/u pap, HPV and GC/CT.   RTC in 6 weeks for IUD string check.         Time spent: 30 minutes    Jr Wolf M.D.

## 2023-07-06 NOTE — PROGRESS NOTES
Pt here for new pt appt  Pt states she is here for an IUD replacement  Pharmacy verified  Good #: [phone] (home)    LMP: unknown  PAP: never had one  BC: has Mirena

## 2023-07-07 LAB
C TRACH DNA GENITAL QL NAA+PROBE: NEGATIVE
CYTOLOGY REG CYTOL: NORMAL
N GONORRHOEA DNA GENITAL QL NAA+PROBE: NEGATIVE
SPECIMEN SOURCE: NORMAL

## 2023-08-10 ENCOUNTER — OFFICE VISIT (OUTPATIENT)
Dept: MEDICAL GROUP | Facility: CLINIC | Age: 23
End: 2023-08-10
Payer: MEDICAID

## 2023-08-10 VITALS
DIASTOLIC BLOOD PRESSURE: 68 MMHG | HEART RATE: 99 BPM | BODY MASS INDEX: 20.88 KG/M2 | RESPIRATION RATE: 16 BRPM | TEMPERATURE: 98.5 F | SYSTOLIC BLOOD PRESSURE: 96 MMHG | HEIGHT: 67 IN | OXYGEN SATURATION: 97 % | WEIGHT: 133 LBS

## 2023-08-10 DIAGNOSIS — R00.2 PALPITATIONS: ICD-10-CM

## 2023-08-10 DIAGNOSIS — G47.09 OTHER INSOMNIA: ICD-10-CM

## 2023-08-10 DIAGNOSIS — M54.50 CHRONIC MIDLINE LOW BACK PAIN WITHOUT SCIATICA: ICD-10-CM

## 2023-08-10 DIAGNOSIS — G89.29 CHRONIC MIDLINE LOW BACK PAIN WITHOUT SCIATICA: ICD-10-CM

## 2023-08-10 PROCEDURE — 3078F DIAST BP <80 MM HG: CPT | Performed by: PHYSICIAN ASSISTANT

## 2023-08-10 PROCEDURE — 99214 OFFICE O/P EST MOD 30 MIN: CPT | Performed by: PHYSICIAN ASSISTANT

## 2023-08-10 PROCEDURE — 3074F SYST BP LT 130 MM HG: CPT | Performed by: PHYSICIAN ASSISTANT

## 2023-08-10 RX ORDER — TRAZODONE HYDROCHLORIDE 50 MG/1
50 TABLET ORAL NIGHTLY PRN
Qty: 90 TABLET | Refills: 0 | Status: SHIPPED | OUTPATIENT
Start: 2023-08-10 | End: 2024-03-11

## 2023-08-10 ASSESSMENT — FIBROSIS 4 INDEX: FIB4 SCORE: 0.45

## 2023-08-10 NOTE — PROGRESS NOTES
Cc:  follow up cardiology    Subjective:     Niki Riley is a 23 y.o. female presenting for follow up cardiology      Patient presents to the office for follow up cardiology.  Patient was seen in May 2022 by [facility].  She was seen for palpitations and a heart monitor was ordered.   She states that she will have palpitations that are bounding.  She states that she had an echo that showed 2 holes.  She has also had dizziness.  She states that she went to ENT and was told that it could be coming from her ears.  She has been told that anxiety and adhd could be contributing to her palpitations.  She is concerned about POTS syndrome. She was told by cardiology that they did not believe the hole was larger.  She states that she had a zio patch.  She states that nothing had changed with the results.     Patient is having difficulty sleeping.  She has tried melatonin and sleepy tea as well as shutting off electronics.  Nothing has helped.      Patient is also requesting information for her physical therapy referral for her back pain.    Review of systems:  See above.   Denies any symptoms unless previously indicated.        Current Outpatient Medications:     traZODone (DESYREL) 50 MG Tab, Take 1 Tablet by mouth at bedtime as needed for Sleep., Disp: 90 Tablet, Rfl: 0    albuterol 108 (90 Base) MCG/ACT Aero Soln inhalation aerosol, Inhale 2 Puffs every 6 hours as needed for Shortness of Breath., Disp: , Rfl:     loratadine (CLARITIN) 10 MG Tab, Take 1 Tablet by mouth every day., Disp: 90 Tablet, Rfl: 2    diclofenac sodium (VOLTAREN) 1 % Gel, Apply 2 g topically 4 times a day as needed (pain). (Patient not taking: Reported on 7/6/2023), Disp: 150 g, Rfl: 1    triamcinolone acetonide (KENALOG) 0.1 % Cream, Apply to affected area twice a day no more than 10 days. (Patient not taking: Reported on 7/6/2023), Disp: 60 g, Rfl: 1    ondansetron (ZOFRAN ODT) 4 MG TABLET DISPERSIBLE, Take 1 Tablet by mouth  "every 8 hours as needed for Nausea/Vomiting. (Patient not taking: Reported on 7/6/2023), Disp: 10 Tablet, Rfl: 0    dicyclomine (BENTYL) 20 MG Tab, Take 1 Tablet by mouth every 6 hours. (Patient not taking: Reported on 5/24/2023), Disp: 120 Tablet, Rfl: 0    Allergies, past medical history, past surgical history, family history, social history reviewed and updated    Objective:     Vitals: BP 96/68 (BP Location: Left arm, Patient Position: Sitting, BP Cuff Size: Adult)   Pulse 99   Temp 36.9 °C (98.5 °F) (Temporal)   Resp 16   Ht 1.689 m (5' 6.5\")   Wt 60.3 kg (133 lb)   SpO2 97%   BMI 21.15 kg/m²   General: Alert, pleasant, NAD  EYES:   PERRL, EOMI, no icterus or pallor.  Conjunctivae and lids normal.   HENT:  Normocephalic.  External ears normal.  Neck supple.    Respiratory: Normal respiratory effort.    Abdomen: Not obese  Skin: Warm, dry, no rashes.  Musculoskeletal: Gait is normal.  Moves all extremities well.    Extremities: normal range of motion all extremities.   Neurological: No tremors, sensation grossly intact, CN2-12 intact.  Psych:  Affect/mood is normal, judgement is good, memory is intact, grooming is appropriate.    Assessment/Plan:     Niki was seen today for heart problem.    Diagnoses and all orders for this visit:    Palpitations  -     REFERRAL TO CARDIOLOGY  -     EC-ECHOCARDIOGRAM COMPLETE W/O CONT; Future    Patient has a significant cardiac history.  She denies having any patching but has been told that she may need a stent in the future.  I do not have a copy of her previous echo.  We will order a new echo at this time.  Will also submit a referral to cardiology for second opinion for further evaluation.    Other insomnia  -     traZODone (DESYREL) 50 MG Tab; Take 1 Tablet by mouth at bedtime as needed for Sleep.    We will try trazodone to help patient's sleep.  Follow-up in 4 to 6 weeks.    Chronic midline low back pain without sciatica    Information for physical therapy " referral given to patient at this time.        Return in about 4 weeks (around 9/7/2023), or if symptoms worsen or fail to improve, for 4-6 weeks.  meds.    Please note that this dictation was created using voice recognition software. I have made every reasonable attempt to correct obvious errors, but expect that there are errors of grammar and possible content that I did not discover before finalizing note.

## 2023-08-17 ENCOUNTER — GYNECOLOGY VISIT (OUTPATIENT)
Dept: OBGYN | Facility: CLINIC | Age: 23
End: 2023-08-17
Payer: MEDICAID

## 2023-08-17 VITALS — WEIGHT: 135 LBS | SYSTOLIC BLOOD PRESSURE: 90 MMHG | DIASTOLIC BLOOD PRESSURE: 60 MMHG | BODY MASS INDEX: 21.46 KG/M2

## 2023-08-17 DIAGNOSIS — N76.0 BV (BACTERIAL VAGINOSIS): ICD-10-CM

## 2023-08-17 DIAGNOSIS — B96.89 BV (BACTERIAL VAGINOSIS): ICD-10-CM

## 2023-08-17 DIAGNOSIS — Z30.431 IUD CHECK UP: Primary | ICD-10-CM

## 2023-08-17 PROCEDURE — 99213 OFFICE O/P EST LOW 20 MIN: CPT | Performed by: NURSE PRACTITIONER

## 2023-08-17 PROCEDURE — 3074F SYST BP LT 130 MM HG: CPT | Performed by: NURSE PRACTITIONER

## 2023-08-17 PROCEDURE — 3078F DIAST BP <80 MM HG: CPT | Performed by: NURSE PRACTITIONER

## 2023-08-17 RX ORDER — METRONIDAZOLE 500 MG/1
500 TABLET ORAL 2 TIMES DAILY
Qty: 14 TABLET | Refills: 0 | Status: SHIPPED | OUTPATIENT
Start: 2023-08-17 | End: 2023-08-24

## 2023-08-17 ASSESSMENT — ENCOUNTER SYMPTOMS
CONSTITUTIONAL NEGATIVE: 1
CARDIOVASCULAR NEGATIVE: 1
GASTROINTESTINAL NEGATIVE: 1
EYES NEGATIVE: 1
MUSCULOSKELETAL NEGATIVE: 1
PSYCHIATRIC NEGATIVE: 1
RESPIRATORY NEGATIVE: 1
NEUROLOGICAL NEGATIVE: 1

## 2023-08-17 ASSESSMENT — FIBROSIS 4 INDEX: FIB4 SCORE: 0.45

## 2023-08-17 NOTE — PROGRESS NOTES
Subjective     Niki Riley is a 23 y.o. female who presents for IUD check.  She had a Mirena placed on 7/6/2023 and no complications were noted.  She denies any heavy bleeding, discharge, or pain.  She had some spotting after placement, and slight cramping about 1 week after placement, but no concerns since then.    Pap was done at that time, and WNL. Did note BV on pap, patient states symptomatic with odor and discharge. RX sent today. Repeat pap in 07/2026    Aware of 7 year efficacy and need to have device removed and replaced in 2030    HPI    Review of Systems   Constitutional: Negative.    HENT: Negative.     Eyes: Negative.    Respiratory: Negative.     Cardiovascular: Negative.    Gastrointestinal: Negative.    Genitourinary: Negative.    Musculoskeletal: Negative.    Skin: Negative.    Neurological: Negative.    Endo/Heme/Allergies: Negative.    Psychiatric/Behavioral: Negative.     All other systems reviewed and are negative.        Objective     BP 90/60   Wt 135 lb   BMI 21.46 kg/m²      Physical Exam  Vitals and nursing note reviewed. Exam conducted with a chaperone present.   Constitutional:       Appearance: Normal appearance.   HENT:      Head: Normocephalic.      Nose: Nose normal.   Eyes:      Extraocular Movements: Extraocular movements intact.   Cardiovascular:      Rate and Rhythm: Normal rate and regular rhythm.      Pulses: Normal pulses.      Heart sounds: Normal heart sounds.   Pulmonary:      Effort: Pulmonary effort is normal.      Breath sounds: Normal breath sounds.   Abdominal:      Palpations: Abdomen is soft.   Genitourinary:     General: Normal vulva.   Musculoskeletal:         General: Normal range of motion.      Cervical back: Normal range of motion.   Skin:     General: Skin is warm and dry.      Capillary Refill: Capillary refill takes less than 2 seconds.   Neurological:      General: No focal deficit present.      Mental Status: She is alert and oriented to  person, place, and time.   Psychiatric:         Mood and Affect: Mood normal.         Behavior: Behavior normal.         Thought Content: Thought content normal.         Judgment: Judgment normal.           Assessment & Plan       1. IUD check up  Mirena replaced 7/6/2023

## 2023-09-25 ENCOUNTER — TELEMEDICINE (OUTPATIENT)
Dept: MEDICAL GROUP | Facility: CLINIC | Age: 23
End: 2023-09-25
Payer: MEDICAID

## 2023-09-25 VITALS — RESPIRATION RATE: 16 BRPM | HEIGHT: 67 IN | BODY MASS INDEX: 21.19 KG/M2 | WEIGHT: 135 LBS

## 2023-09-25 DIAGNOSIS — E16.2 HYPOGLYCEMIA: ICD-10-CM

## 2023-09-25 DIAGNOSIS — R11.0 NAUSEA: ICD-10-CM

## 2023-09-25 DIAGNOSIS — R00.2 PALPITATIONS: ICD-10-CM

## 2023-09-25 PROCEDURE — 99214 OFFICE O/P EST MOD 30 MIN: CPT | Mod: 95 | Performed by: PHYSICIAN ASSISTANT

## 2023-09-25 RX ORDER — HYDROCODONE BITARTRATE AND ACETAMINOPHEN 10; 325 MG/1; MG/1
TABLET ORAL
COMMUNITY
End: 2023-09-25

## 2023-09-25 RX ORDER — ONDANSETRON 4 MG/1
1 TABLET, ORALLY DISINTEGRATING ORAL EVERY 8 HOURS PRN
COMMUNITY
End: 2023-09-25

## 2023-09-25 RX ORDER — ONDANSETRON HYDROCHLORIDE 4 MG/2ML
INJECTION, SOLUTION INTRAMUSCULAR; INTRAVENOUS
COMMUNITY
End: 2023-09-25

## 2023-09-25 RX ORDER — SODIUM CHLORIDE 0.9 % (FLUSH) 0.9 %
SYRINGE (ML) INJECTION
COMMUNITY
End: 2023-09-25

## 2023-09-25 RX ORDER — GLUCOSAMINE HCL/CHONDROITIN SU 500-400 MG
CAPSULE ORAL
Qty: 200 EACH | Refills: 2 | Status: SHIPPED | OUTPATIENT
Start: 2023-09-25

## 2023-09-25 RX ORDER — ONDANSETRON 4 MG/1
4 TABLET, ORALLY DISINTEGRATING ORAL EVERY 8 HOURS PRN
Qty: 10 TABLET | Refills: 3 | Status: SHIPPED | OUTPATIENT
Start: 2023-09-25 | End: 2024-01-16 | Stop reason: SDUPTHER

## 2023-09-25 RX ORDER — AMOXICILLIN 250 MG/1
CAPSULE ORAL
COMMUNITY
End: 2023-09-25

## 2023-09-25 RX ORDER — LANCETS 30 GAUGE
EACH MISCELLANEOUS
Qty: 200 EACH | Refills: 2 | Status: SHIPPED | OUTPATIENT
Start: 2023-09-25 | End: 2023-12-05 | Stop reason: SDUPTHER

## 2023-09-25 RX ORDER — HYDROCODONE BITARTRATE AND ACETAMINOPHEN 5; 325 MG/1; MG/1
TABLET ORAL
COMMUNITY
End: 2023-09-25

## 2023-09-25 RX ORDER — AMOXICILLIN 500 MG/1
CAPSULE ORAL
COMMUNITY
End: 2023-09-25

## 2023-09-25 RX ORDER — LEVONORGESTREL 52 MG/1
INTRAUTERINE DEVICE INTRAUTERINE
COMMUNITY
Start: 2023-07-06 | End: 2024-03-11

## 2023-09-25 RX ORDER — IBUPROFEN 800 MG/1
TABLET ORAL
COMMUNITY
End: 2023-09-25

## 2023-09-25 RX ORDER — ACETAMINOPHEN AND CODEINE PHOSPHATE 300; 30 MG/1; MG/1
TABLET ORAL
COMMUNITY
End: 2023-09-25

## 2023-09-25 RX ORDER — DICYCLOMINE HCL 20 MG
1 TABLET ORAL EVERY 6 HOURS
COMMUNITY
End: 2023-09-25

## 2023-09-25 RX ORDER — PANTOPRAZOLE SODIUM 40 MG/1
TABLET, DELAYED RELEASE ORAL
COMMUNITY
End: 2023-09-25

## 2023-09-25 ASSESSMENT — FIBROSIS 4 INDEX: FIB4 SCORE: 0.45

## 2023-09-25 NOTE — PROGRESS NOTES
Virtual Visit: Established Patient   This visit was conducted via Zoom using secure and encrypted videoconferencing technology.   The patient was in their home in the [facility].    The patient's identity was confirmed and verbal consent was obtained for this virtual visit.    Subjective:   CC:   Chief Complaint   Patient presents with    Results     Echo results      Niki Riley is a 23 y.o. female presenting for evaluation and management of:    Echo results.  Patient presents virtually to discuss her echo results.  She indicates that she was told that she has 2 holes in her heart.  Echo does not show holes.  I am wondering if she was told that she may have valve insufficiency with her tricuspid and pulmonic valve.  She presents indicating that she continues to have palpitations that are quite significant for her.  She also acknowledges that she has had syncope or near syncope with her symptoms.    Patient requests a refill of zofran.  She indicates that some of her larger dinners will cause nausea.  She states that she has a hard time eating and does not eat a lot of food.  She also indicates that she does not eat a lot during the day and when she has a big meal at dinner, it is very difficult for her to not have nausea and vomiting.  When inquired further, she does indicate that she eats very little during the day and sometimes when she has palpitations or syncope or near syncope events, it only improves if she has coffee with sugar or cheese stick.    ROS   Denies any other symptoms unless previously indicated.      Current medicines (including changes today)  Current Outpatient Medications   Medication Sig Dispense Refill    MIRENA, 52 MG, 20 MCG/DAY IUD       ondansetron (ZOFRAN ODT) 4 MG TABLET DISPERSIBLE Take 1 Tablet by mouth every 8 hours as needed for Nausea/Vomiting. 10 Tablet 3    Blood Glucose Meter Kit Test blood sugar as recommended by provider. One Touch Verio blood glucose  "monitoring kit. 1 Kit 0    Blood Glucose Test Strips Use one One Touch Verio strip to test blood sugar twice daily. 200 Strip 2    Lancets Use one One Touch Verio lancet to test blood sugar twice daily. 200 Each 2    Alcohol Swabs Wipe site with prep pad prior to injection. 200 Each 2    traZODone (DESYREL) 50 MG Tab Take 1 Tablet by mouth at bedtime as needed for Sleep. 90 Tablet 0    albuterol 108 (90 Base) MCG/ACT Aero Soln inhalation aerosol Inhale 2 Puffs every 6 hours as needed for Shortness of Breath.      loratadine (CLARITIN) 10 MG Tab Take 1 Tablet by mouth every day. 90 Tablet 2    dicyclomine (BENTYL) 20 MG Tab Take 1 Tablet by mouth every 6 hours. 120 Tablet 0     No current facility-administered medications for this visit.       Patient Active Problem List    Diagnosis Date Noted    Other insomnia 08/10/2023    Adolescent idiopathic scoliosis of thoracolumbar region 06/07/2023    Anger reaction 05/24/2023    PTSD (post-traumatic stress disorder) 05/24/2023    IUD (intrauterine device) in place 05/24/2023    Seasonal allergies 05/24/2023    IUD complication (HCC) 09/15/2022    Breast pain 05/16/2022    Breast lump 04/18/2022    Underweight 03/21/2022    Nausea 03/21/2022    Weight loss 03/21/2022    Hypoglycemia 03/21/2022    Anxiety 02/12/2021    Palpitations 02/12/2021    Hospital discharge follow-up 10/01/2019    Drug-induced sexual dysfunction (HCC) 04/29/2019    Pain of right scapula 04/29/2019    Myalgia of muscle of neck 01/17/2019    Cyst of right ovary 06/27/2018    Eczema 05/29/2018    Mild intermittent asthma without complication 12/04/2017    Chronic midline low back pain without sciatica 05/15/2017    Mild episode of recurrent major depressive disorder (HCC) 12/30/2016        Objective:   Resp 16   Ht 1.689 m (5' 6.5\") Comment: Pt stated  Wt 61.2 kg (135 lb) Comment: Pt stated  BMI 21.46 kg/m²     Physical Exam:  Constitutional: Alert, no distress, well-groomed.  Skin: No rashes in " visible areas.  Eye: Round. Conjunctiva clear, lids normal. No icterus.   ENMT: Lips pink without lesions, good dentition, moist mucous membranes. Phonation normal.  Neck: No masses, no thyromegaly. Moves freely without pain.  Respiratory: Unlabored respiratory effort, no cough or audible wheeze  Psych: Alert and oriented x3, normal affect and mood.                                               Echocardiogram Report   Name: YESENIA DANIEL             Study Date: 2023                  Height: 66 in   MRN: [id number]                                Patient Location: [facility]           Weight: 130 lb   : 2000                              Gender: Female                          BSA: 1.7 m2   Age: 23 yrs                                  Account #: [id number]                    BP: 108/63 mmHg   Reason For Study: Palpitations                                                       HR: 80   Ordering Physician: SHARON CONN              Performed By: Margoth Aguilar RDCS   Referring Physician: SHARON CONN     Study Comments:   Prior Exam Yes. Two forms of patient identification verified. A two-dimensional   transthoracic echocardiogram with color flow and Doppler was performed. The procedure was explained   to the patient and/or family member present. Digital images were acquired using a [employer] E95   Ultrasound System. The study was technically adequate. The study was done portably. The patient was   in normal sinus rhythm during the exam.   Left Ventricle:   The left ventricle is normal in size, thickness and function. The Ejection   Fraction estimate is 60-65%. No regional wall motion abnormalities noted. A variety of Doppler   measurements indicate normal left ventricular diastolic function.   Right Ventricle:   The right ventricle is normal in size and function. Tricuspid Annular Plane   Systolic Excursion is greater than 1.8 cm.   Atria:   The left and right atria are normal in size. There  is no Doppler evidence for an   interatrial shunt.   Mitral Valve:   The mitral valve leaflets appear normal. There is no evidence of stenosis,   fluttering, or prolapse. There is mild mitral regurgitation.   Aortic Valve:   The aortic valve is normal in structure and function. The aortic valve is   trileaflet. The aortic valve opens well. No hemodynamically significant valvular aortic stenosis. No   aortic regurgitation is present.   Tricuspid Valve:   The tricuspid valve leaflets are thin and pliable. There is no tricuspid   stenosis. There is trace tricuspid regurgitation.   Pulmonic Valve:   There is no pulmonic valvular stenosis. There is mild pulmonic regurgitation.   Great Vessels:   The aortic root is normal size. The IVC was normal in size at < 23 mm and collapsed   >50% with respiration (RAP 3 mmHg).   Pericardium/Pleural:   There is no pericardial effusion.     MMode/2D Measurements & Calculations   LVIDd: 4.8 cm      FS: 38.4 %              LV mass(C)d: 81.5 grams   Ao root diam: 2.6 cm   LVIDs: 3.0 cm                                                        ACS: 1.8 cm   LVPWd: 0.55 cm                                                       LA dimension: 2.7 cm   IVSd: 0.56 cm                                                        LA Volume Index: 11.1 ml/m2               ______________________________________________________________________________   LVOT diam: 2.0 cm  EDV(MOD-sp4): 62.3 ml   EDV(MOD-sp2): 60.1 ml     LA ESV-A/L: 18.4 ml                      ESV(MOD-sp4): 29.2 ml   ESV(MOD-sp2): 26.1 ml                      EF(MOD-sp4): 53.0 %     EF(MOD-sp2): 56.6 %             ______________________________________________________________________________   RA Length: 3.8 cm  LAV(MOD-sp2): 15.0 ml   LAV(MOD-sp4): 18.1 ml     LV EF (MOD) bp: 55.1 %   RA Width: 3.3 cm             ______________________________________________________________________________   TAPSE: 1.9 cm     Doppler Measurements &  Calculations   MV E max marcos: 56.6 cm/sec   E/E' sept: 5.0        MV dec slope: 314.3 cm/sec2Ao V2 max: 104.0 cm/sec   Med Peak E' Marcos: 11.4 cm/secE/E' lat: 3.3         MV dec time: 0.18 sec      Ao max P.3 mmHg   Lat Peak E' Marcos: 17.1 cm/secMV V2 max: 75.6 cm/sec                           Ao mean P.4 mmHg   MV A max marcos: 39.6 cm/sec   MV max P.3 mmHg                              Ao V2 VTI: 20.7 cm   MV E/A: 1.4                 MV mean P.96 mmHg                            CLOVIS(I,A): 2.9 cm2                               MVA(VTI): 3.4 cm2               ______________________________________________________________________________   LV V1 max: 100.0 cm/sec     PA V2 max: 96.5 cm/secPI end-d marcos: 105.1 cm/sec RV V1 mean P.6 mmHg   LV V1 max P.0 mmHg      PA max PG: 3.7 mmHg                              RV V1 mean: 57.6 cm/sec   LV V1 mean P.1 mmHg     PA mean P.3 mmHg                             RV V1 VTI: 20.0 cm   LV V1 VTI: 18.7 cm          PA V2 VTI: 23.8 cm               ______________________________________________________________________________   E/E' AV.1     Interpretation Summary   1. Normal LV systolic function. Ejection fraction is 60-65%. Diastolic function is normal.   2. Normal RV size and function.   3. No hemodynamically significant valvular abnormalities.   4. No pericardial effusion.   5. Compared to prior study in 2020, RV function appears normal.      Assessment and Plan:   The following treatment plan was discussed:     1. Palpitations  - Tilt Table Test  - Cardiac Event Monitor; Future    We will obtain a Holter monitor and a table tilt test to evaluate further.    2. Hypoglycemia  - Blood Glucose Meter Kit; Test blood sugar as recommended by provider. One Touch Verio blood glucose monitoring kit.  Dispense: 1 Kit; Refill: 0  - Blood Glucose Test Strips; Use one One Touch Verio strip to test blood sugar twice daily.  Dispense: 200 Strip; Refill: 2  -  Lancets; Use one One Touch Verio lancet to test blood sugar twice daily.  Dispense: 200 Each; Refill: 2  - Alcohol Swabs; Wipe site with prep pad prior to injection.  Dispense: 200 Each; Refill: 2  3. Nausea  - ondansetron (ZOFRAN ODT) 4 MG TABLET DISPERSIBLE; Take 1 Tablet by mouth every 8 hours as needed for Nausea/Vomiting.  Dispense: 10 Tablet; Refill: 3    I will refill her antinausea medication.  However my concern is patient may actually be having hypoglycemic events causing palpitations and syncope or near syncope.  We will order a glucometer and she will need to check her glucose levels when she has symptoms.  We will follow-up in approximately 8 weeks as it will take time to obtain the Holter monitor test results.  I have also advised her to increase her fuel intake.  I believe this may be a significant reason for her symptoms.    Other orders  - MIRENA, 52 MG, 20 MCG/DAY IUD      Follow-up: Return in about 8 weeks (around 11/20/2023).

## 2023-10-03 ENCOUNTER — NON-PROVIDER VISIT (OUTPATIENT)
Dept: CARDIOLOGY | Facility: MEDICAL CENTER | Age: 23
End: 2023-10-03
Attending: PHYSICIAN ASSISTANT
Payer: MEDICAID

## 2023-10-03 DIAGNOSIS — R00.2 PALPITATIONS: ICD-10-CM

## 2023-10-03 NOTE — PROGRESS NOTES
In progress activated on 10/08/23    Home enrollment completed in the 30 day [employer] heart monitoring program per Binta Ware PA-C.  Monitor will be shipped to patient via [employer]. Baseline Pending. EOS Pending.

## 2023-10-13 ENCOUNTER — OFFICE VISIT (OUTPATIENT)
Dept: URGENT CARE | Facility: PHYSICIAN GROUP | Age: 23
End: 2023-10-13
Payer: MEDICAID

## 2023-10-13 VITALS
BODY MASS INDEX: 22.02 KG/M2 | OXYGEN SATURATION: 97 % | DIASTOLIC BLOOD PRESSURE: 82 MMHG | WEIGHT: 137 LBS | TEMPERATURE: 97.2 F | HEART RATE: 92 BPM | RESPIRATION RATE: 12 BRPM | SYSTOLIC BLOOD PRESSURE: 104 MMHG | HEIGHT: 66 IN

## 2023-10-13 DIAGNOSIS — J06.9 URI WITH COUGH AND CONGESTION: ICD-10-CM

## 2023-10-13 DIAGNOSIS — J02.9 PHARYNGITIS, UNSPECIFIED ETIOLOGY: ICD-10-CM

## 2023-10-13 LAB — S PYO DNA SPEC NAA+PROBE: NOT DETECTED

## 2023-10-13 PROCEDURE — 99213 OFFICE O/P EST LOW 20 MIN: CPT | Performed by: NURSE PRACTITIONER

## 2023-10-13 PROCEDURE — 3074F SYST BP LT 130 MM HG: CPT | Performed by: NURSE PRACTITIONER

## 2023-10-13 PROCEDURE — 3079F DIAST BP 80-89 MM HG: CPT | Performed by: NURSE PRACTITIONER

## 2023-10-13 PROCEDURE — 87651 STREP A DNA AMP PROBE: CPT | Performed by: NURSE PRACTITIONER

## 2023-10-13 RX ORDER — DEXAMETHASONE SODIUM PHOSPHATE 10 MG/ML
10 INJECTION INTRAMUSCULAR; INTRAVENOUS ONCE
Status: COMPLETED | OUTPATIENT
Start: 2023-10-13 | End: 2023-10-13

## 2023-10-13 RX ORDER — DEXTROMETHORPHAN HYDROBROMIDE AND PROMETHAZINE HYDROCHLORIDE 15; 6.25 MG/5ML; MG/5ML
5 SYRUP ORAL EVERY 4 HOURS PRN
Qty: 120 ML | Refills: 0 | Status: SHIPPED | OUTPATIENT
Start: 2023-10-13 | End: 2023-10-20

## 2023-10-13 RX ADMIN — DEXAMETHASONE SODIUM PHOSPHATE 10 MG: 10 INJECTION INTRAMUSCULAR; INTRAVENOUS at 12:06

## 2023-10-13 ASSESSMENT — ENCOUNTER SYMPTOMS
SORE THROAT: 1
MYALGIAS: 1
ABDOMINAL PAIN: 0
CHILLS: 1
FEVER: 0
NAUSEA: 1
VOMITING: 0
COUGH: 1
HEADACHES: 1
DIARRHEA: 0

## 2023-10-13 ASSESSMENT — FIBROSIS 4 INDEX: FIB4 SCORE: 0.45

## 2023-10-13 NOTE — PROGRESS NOTES
Subjective:     Niki Riley is a 23 y.o. female who presents for Pharyngitis (X4-5days ), Cough, and Congestion      Pharyngitis   Associated symptoms include congestion, coughing and headaches. Pertinent negatives include no abdominal pain, diarrhea or vomiting.   Cough  Associated symptoms include chills, headaches, myalgias and a sore throat. Pertinent negatives include no fever.     Pt presents for evaluation of a new problem. Niki is a pleasant 23-year-old female presents to urgent care today with complaints of a severe sore throat, cough, congestion, headache and body aches x5 days.  Her symptoms have progressively worsened.  She has tried using NyQuil however, this did make her nauseous and dizzy.  She states that she went to visit her mom and her little sister was ill with similar symptoms.  She is unsure if her sister was suffering from strep throat.  Negative for diarrhea or vomiting.    Review of Systems   Constitutional:  Positive for chills and malaise/fatigue. Negative for fever.   HENT:  Positive for congestion and sore throat.    Respiratory:  Positive for cough.    Gastrointestinal:  Positive for nausea. Negative for abdominal pain, diarrhea and vomiting.   Musculoskeletal:  Positive for myalgias.   Neurological:  Positive for headaches.       PMH:   Past Medical History:   Diagnosis Date    Asthma     Migraine     Shoulder pain 8/8/2014    Urinary tract infection      ALLERGIES:   Allergies   Allergen Reactions    Sulfa Drugs     Honey Dew     Kiwi Extract Anaphylaxis     Clarified by dietary staff     SURGHX:   Past Surgical History:   Procedure Laterality Date    PELVISCOPY  9/5/2018    Procedure: PELVISCOPY-  OVARIAN CYSTECTOMY;  Surgeon: Kathleen Chen M.D.;  Location: SURGERY SAME DAY [facility];  Service: Gynecology    GASTROSCOPY  5/24/2014    Performed by Luis Felipe Decker M.D. at SURGERY [facility]     SOCHX:   Social History     Socioeconomic History    Marital  "status: Legally    Tobacco Use    Smoking status: Never    Smokeless tobacco: Never    Tobacco comments:     vapes   Vaping Use    Vaping Use: Every day    Substances: Flavoring    Devices: Refillable tank   Substance and Sexual Activity    Alcohol use: No     Alcohol/week: 0.0 oz    Drug use: No    Sexual activity: Yes     Partners: Male     Birth control/protection: I.U.D.     FH:   Family History   Problem Relation Age of Onset    Anxiety disorder Mother     Heart Disease Maternal Grandfather          Objective:   /82   Pulse 92   Temp 36.2 °C (97.2 °F) (Temporal)   Resp 12   Ht 1.676 m (5' 6\")   Wt 62.1 kg (137 lb)   SpO2 97%   BMI 22.11 kg/m²     Physical Exam  Vitals and nursing note reviewed.   Constitutional:       General: She is not in acute distress.     Appearance: Normal appearance. She is normal weight. She is ill-appearing. She is not toxic-appearing.   HENT:      Head: Normocephalic.      Right Ear: Tympanic membrane, ear canal and external ear normal.      Left Ear: Tympanic membrane, ear canal and external ear normal.      Nose: Congestion present. No rhinorrhea.      Mouth/Throat:      Mouth: Mucous membranes are moist.      Pharynx: Posterior oropharyngeal erythema present. No oropharyngeal exudate.      Comments: Tonsils bilaterally +2, uvula is midline throat is injected.  Negative for exudate.  Eyes:      General:         Right eye: No discharge.         Left eye: No discharge.      Pupils: Pupils are equal, round, and reactive to light.   Pulmonary:      Effort: Pulmonary effort is normal.   Abdominal:      General: Abdomen is flat.   Musculoskeletal:         General: Normal range of motion.      Cervical back: Normal range of motion and neck supple. Tenderness present.   Lymphadenopathy:      Cervical: Cervical adenopathy present.   Skin:     General: Skin is dry.   Neurological:      General: No focal deficit present.      Mental Status: She is alert and oriented to " person, place, and time. Mental status is at baseline.   Psychiatric:         Mood and Affect: Mood normal.         Behavior: Behavior normal.         Thought Content: Thought content normal.         Judgment: Judgment normal.       Results for orders placed or performed in visit on 10/13/23   POCT Cepheid Group A Strep - PCR   Result Value Ref Range    POC Group A Strep, PCR Not Detected Not Detected, Invalid       Assessment/Plan:   Assessment    1. Pharyngitis, unspecified etiology  POCT Cepheid Group A Strep - PCR    dexamethasone (Decadron) injection (check route below) 10 mg      2. URI with cough and congestion  promethazine-dextromethorphan (PROMETHAZINE-DM) 6.25-15 MG/5ML syrup        Patient declines viral testing today.  Strep test was negative in clinic.  I do believe that she is suffering from viral infection at this time.  Promethazine/dextromethorphan sent to pharmacy for relief of cough.  Sedative side effects discussed with patient.  She was given 10 mg Decadron in clinic for relief of sore throat. We discussed supportive measures including humidifier, warm salt water gargles, over-the-counter Cepacol throat lozenges, rest  and increased fluids. Pt was encouraged to seek treatment back in the ER or urgent care for worsening symptoms,  fever greater than 100.5, wheezes or shortness of breath.    AVS handout given and reviewed with patient. Pt educated on red flags and when to seek treatment back in ER or UC.

## 2023-10-24 ENCOUNTER — HOSPITAL ENCOUNTER (OUTPATIENT)
Dept: CARDIOLOGY | Facility: MEDICAL CENTER | Age: 23
End: 2023-10-24
Attending: PHYSICIAN ASSISTANT
Payer: MEDICAID

## 2023-10-24 DIAGNOSIS — R00.2 PALPITATIONS: ICD-10-CM

## 2023-10-24 PROCEDURE — 93660 TILT TABLE EVALUATION: CPT

## 2023-10-24 NOTE — PROGRESS NOTES
Patient had PASSIVE tilt table exam today.    Patient NPO for exam, has  home.   Patient was NEGATIVE for passing out.     Consent signed.   Patient given verbal instructions/education regarding exam.   Patient had 20 mins of passive phase, followed by 5 mins of recovery.    Patient vitals: HR , Blood Pressures 93//73.    Patient had self-reported symptoms, see hard chart.   After recovery phase, patient states that they are ready to go home.    Patient offered water.    Patient vitals returned to baseline.    Patient given verbal discharge instructions per protocol.   Patient PIV removed.   Patient ambulated self to [facility], escorted by RN, met by family member to drive patient home.    Patient of Binta Ware PA-C. EKG tracings and findings.  Dr. Harvey ADR notified of EKG tracings.   RN placed EKG tracings and patient documents in box to be read.

## 2023-11-07 ENCOUNTER — TELEPHONE (OUTPATIENT)
Dept: CARDIOLOGY | Facility: MEDICAL CENTER | Age: 23
End: 2023-11-07
Payer: MEDICAID

## 2023-11-07 PROCEDURE — 93272 ECG/REVIEW INTERPRET ONLY: CPT | Performed by: INTERNAL MEDICINE

## 2023-12-05 DIAGNOSIS — E16.2 HYPOGLYCEMIA: ICD-10-CM

## 2023-12-05 RX ORDER — LANCETS 30 GAUGE
EACH MISCELLANEOUS
Qty: 400 EACH | Refills: 2 | Status: SHIPPED | OUTPATIENT
Start: 2023-12-05 | End: 2024-03-11

## 2023-12-05 RX ORDER — LANCETS 30 GAUGE
EACH MISCELLANEOUS
Qty: 200 EACH | Refills: 0 | Status: SHIPPED | OUTPATIENT
Start: 2023-12-05 | End: 2023-12-05 | Stop reason: SDUPTHER

## 2023-12-05 NOTE — TELEPHONE ENCOUNTER
Received request via: Patient    Was the patient seen in the last year in this department? Yes    Does the patient have an active prescription (recently filled or refills available) for medication(s) requested?  Yes patient sts unable to  from pharmacy until 12/14 and needs emergency refill     Does the patient have [facility] and need 100 day supply (blood pressure, diabetes and cholesterol meds only)? Patient does not have SCP    Last Ov 9/25/23  Last Labs 2/27/23    Pt sts the RX sts to use 2 times a day and she thought she should be checking any time she feels sick so she ran out early - pharmacy told her the RX just needs to be rewritten to reflect how many times a day she should be using them

## 2023-12-20 ENCOUNTER — OFFICE VISIT (OUTPATIENT)
Dept: MEDICAL GROUP | Facility: CLINIC | Age: 23
End: 2023-12-20
Payer: MEDICAID

## 2023-12-20 VITALS
TEMPERATURE: 99.1 F | WEIGHT: 138.89 LBS | HEIGHT: 66 IN | DIASTOLIC BLOOD PRESSURE: 68 MMHG | OXYGEN SATURATION: 98 % | BODY MASS INDEX: 22.32 KG/M2 | RESPIRATION RATE: 16 BRPM | HEART RATE: 90 BPM | SYSTOLIC BLOOD PRESSURE: 94 MMHG

## 2023-12-20 DIAGNOSIS — H92.02 EAR PAIN, LEFT: ICD-10-CM

## 2023-12-20 DIAGNOSIS — E16.2 HYPOGLYCEMIA: ICD-10-CM

## 2023-12-20 DIAGNOSIS — R73.09 ABNORMAL GLUCOSE: ICD-10-CM

## 2023-12-20 PROCEDURE — 3074F SYST BP LT 130 MM HG: CPT | Performed by: PHYSICIAN ASSISTANT

## 2023-12-20 PROCEDURE — 3078F DIAST BP <80 MM HG: CPT | Performed by: PHYSICIAN ASSISTANT

## 2023-12-20 PROCEDURE — 99213 OFFICE O/P EST LOW 20 MIN: CPT | Performed by: PHYSICIAN ASSISTANT

## 2023-12-20 RX ORDER — BLOOD-GLUCOSE METER
KIT MISCELLANEOUS
COMMUNITY
Start: 2023-09-26

## 2023-12-20 RX ORDER — LAMOTRIGINE 25 MG/1
TABLET ORAL
COMMUNITY
Start: 2023-12-03 | End: 2023-12-20

## 2023-12-20 RX ORDER — LAMOTRIGINE 100 MG/1
100 TABLET ORAL
COMMUNITY
Start: 2023-12-05 | End: 2024-03-11

## 2023-12-20 RX ORDER — BLOOD SUGAR DIAGNOSTIC
STRIP MISCELLANEOUS
COMMUNITY
Start: 2023-12-09

## 2023-12-20 RX ORDER — UBIQUINOL 100 MG
CAPSULE ORAL
COMMUNITY
Start: 2023-09-26 | End: 2023-12-20

## 2023-12-20 RX ORDER — AMOXICILLIN 500 MG/1
500 CAPSULE ORAL 3 TIMES DAILY
Qty: 30 CAPSULE | Refills: 0 | Status: SHIPPED | OUTPATIENT
Start: 2023-12-20 | End: 2024-01-16 | Stop reason: SDUPTHER

## 2023-12-20 ASSESSMENT — FIBROSIS 4 INDEX: FIB4 SCORE: 0.45

## 2023-12-20 NOTE — PROGRESS NOTES
cc:  glucose levels     Subjective:     Niki Riley is a 23 y.o. female presenting for glucose levels      Patient presents to the office for glucose levels.  Patient has been having hypoglycemic events.  She has been having glucose readings from 110-120.  She states that these are usually before she eats.  She states that at night it has been high with the highest being about 175.  She states that this is about 2 hours after eating.  She did have a 147 before dinner.  Patient is wanting to conceive.  She would like to know if this is possible with her sugar levels.  She is also concerned about lamotrigine.    Patient states that she has been having left ear pain for one day.  She states that the pain is sharp and is radiating down into her neck.      Review of systems:  See above.   Denies any symptoms unless previously indicated.        Current Outpatient Medications:     Blood Glucose Monitoring Suppl (ONETOUCH VERIO REFLECT) w/Device Kit, USE AS DIRECTED, Disp: , Rfl:     lamoTRIgine (LAMICTAL) 100 MG Tab, Take 100 mg by mouth at bedtime., Disp: , Rfl:     ONETOUCH VERIO strip, USE 1 STRIP TO CHECK GLUCOSE 4 TIMES DAILY, Disp: , Rfl:     amoxicillin (AMOXIL) 500 MG Cap, Take 1 Capsule by mouth 3 times a day., Disp: 30 Capsule, Rfl: 0    Blood Glucose Test Strips, Use one One Touch Verio strip to test blood sugar four times daily., Disp: 400 Strip, Rfl: 2    Lancets, Use one One Touch Verio lancet to test blood sugar four times daily., Disp: 400 Each, Rfl: 2    MIRENA, 52 MG, 20 MCG/DAY IUD, , Disp: , Rfl:     ondansetron (ZOFRAN ODT) 4 MG TABLET DISPERSIBLE, Take 1 Tablet by mouth every 8 hours as needed for Nausea/Vomiting., Disp: 10 Tablet, Rfl: 3    Blood Glucose Meter Kit, Test blood sugar as recommended by provider. One Touch Verio blood glucose monitoring kit., Disp: 1 Kit, Rfl: 0    Alcohol Swabs, Wipe site with prep pad prior to injection., Disp: 200 Each, Rfl: 2    albuterol 108 (90 Base)  "MCG/ACT Aero Soln inhalation aerosol, Inhale 2 Puffs every 6 hours as needed for Shortness of Breath., Disp: , Rfl:     loratadine (CLARITIN) 10 MG Tab, Take 1 Tablet by mouth every day., Disp: 90 Tablet, Rfl: 2    traZODone (DESYREL) 50 MG Tab, Take 1 Tablet by mouth at bedtime as needed for Sleep. (Patient not taking: Reported on 12/20/2023), Disp: 90 Tablet, Rfl: 0    dicyclomine (BENTYL) 20 MG Tab, Take 1 Tablet by mouth every 6 hours. (Patient not taking: Reported on 12/20/2023), Disp: 120 Tablet, Rfl: 0    Allergies, past medical history, past surgical history, family history, social history reviewed and updated    Objective:     Vitals: BP 94/68 (BP Location: Left arm, Patient Position: Sitting, BP Cuff Size: Adult)   Pulse 90   Temp 37.3 °C (99.1 °F) (Temporal)   Resp 16   Ht 1.676 m (5' 6\")   Wt 63 kg (138 lb 14.2 oz)   SpO2 98%   BMI 22.42 kg/m²   General: Alert, pleasant, NAD  EYES:   PERRL, EOMI, no icterus or pallor.  Conjunctivae and lids normal.   HENT:  Normocephalic.  External ears normal. Tympanic membranes pearly, opaque.  Possible congestion.  Mildly pink in the left ear canal.  No nasal drainage present.  Neck supple.    Respiratory: Normal respiratory effort.    Abdomen: Not obese.  Skin: Warm, dry, no rashes.  Musculoskeletal: Gait is normal.  Moves all extremities well.    Extremities: Normal range of motion all extremities.   Neurological: No tremors, sensation grossly intact, CN2-12 intact.  Psych:  Affect/mood is normal, judgement is good, memory is intact, grooming is appropriate.    Assessment/Plan:     Niki was seen today for diabetes and ear pain.    Diagnoses and all orders for this visit:    Hypoglycemia  -     INSULIN AND C-PEPTIDE, SERUM  -     HEMOGLOBIN A1C; Future  -     Comp Metabolic Panel; Future  -     AMYLASE; Future  -     LIPASE; Future  -     GLUCOSE TOLERANCE 2 HOUR; Future  Abnormal glucose  -     INSULIN AND C-PEPTIDE, SERUM  -     HEMOGLOBIN A1C; Future  -  "    Comp Metabolic Panel; Future  -     AMYLASE; Future  -     LIPASE; Future  -     GLUCOSE TOLERANCE 2 HOUR; Future    We will obtain labs for further evaluation.  As glucose levels are in multiple ranges, I do believe a glucose tolerance test is needed at this time.  Follow-up in 3 to 4 weeks with results.  Also advised that she will need to follow-up with specialist for lamotrigine.    Ear pain, left  -     amoxicillin (AMOXIL) 500 MG Cap; Take 1 Capsule by mouth 3 times a day.      Possibly developing an ear infection.  May be early.  I will order amoxicillin.  Patient to hold.  If symptoms do not improve in the next day or 2, she can start the antibiotic to see if this helps.      Return in about 4 weeks (around 1/17/2024), or if symptoms worsen or fail to improve, for 3-4 weeks..    Please note that this dictation was created using voice recognition software. I have made every reasonable attempt to correct obvious errors, but expect that there are errors of grammar and possible content that I did not discover before finalizing note.

## 2023-12-29 ENCOUNTER — HOSPITAL ENCOUNTER (OUTPATIENT)
Dept: LAB | Facility: MEDICAL CENTER | Age: 23
End: 2023-12-29
Attending: PHYSICIAN ASSISTANT
Payer: MEDICAID

## 2023-12-29 DIAGNOSIS — E16.2 HYPOGLYCEMIA: ICD-10-CM

## 2023-12-29 DIAGNOSIS — R73.09 ABNORMAL GLUCOSE: ICD-10-CM

## 2023-12-29 LAB
ALBUMIN SERPL BCP-MCNC: 4.4 G/DL (ref 3.2–4.9)
ALBUMIN/GLOB SERPL: 1.4 G/DL
ALP SERPL-CCNC: 49 U/L (ref 30–99)
ALT SERPL-CCNC: 10 U/L (ref 2–50)
AMYLASE SERPL-CCNC: 78 U/L (ref 20–103)
ANION GAP SERPL CALC-SCNC: 12 MMOL/L (ref 7–16)
AST SERPL-CCNC: 13 U/L (ref 12–45)
BILIRUB SERPL-MCNC: 0.4 MG/DL (ref 0.1–1.5)
BUN SERPL-MCNC: 9 MG/DL (ref 8–22)
CALCIUM ALBUM COR SERPL-MCNC: 9 MG/DL (ref 8.5–10.5)
CALCIUM SERPL-MCNC: 9.3 MG/DL (ref 8.5–10.5)
CHLORIDE SERPL-SCNC: 103 MMOL/L (ref 96–112)
CO2 SERPL-SCNC: 21 MMOL/L (ref 20–33)
CREAT SERPL-MCNC: 0.79 MG/DL (ref 0.5–1.4)
EST. AVERAGE GLUCOSE BLD GHB EST-MCNC: 94 MG/DL
GFR SERPLBLD CREATININE-BSD FMLA CKD-EPI: 107 ML/MIN/1.73 M 2
GLOBULIN SER CALC-MCNC: 3.1 G/DL (ref 1.9–3.5)
GLUCOSE SERPL-MCNC: 75 MG/DL (ref 65–99)
HBA1C MFR BLD: 4.9 % (ref 4–5.6)
LIPASE SERPL-CCNC: 41 U/L (ref 11–82)
POTASSIUM SERPL-SCNC: 3.9 MMOL/L (ref 3.6–5.5)
PROT SERPL-MCNC: 7.5 G/DL (ref 6–8.2)
SODIUM SERPL-SCNC: 136 MMOL/L (ref 135–145)

## 2023-12-29 PROCEDURE — 82150 ASSAY OF AMYLASE: CPT

## 2023-12-29 PROCEDURE — 83690 ASSAY OF LIPASE: CPT

## 2023-12-29 PROCEDURE — 83525 ASSAY OF INSULIN: CPT

## 2023-12-29 PROCEDURE — 83036 HEMOGLOBIN GLYCOSYLATED A1C: CPT

## 2023-12-29 PROCEDURE — 80053 COMPREHEN METABOLIC PANEL: CPT

## 2023-12-29 PROCEDURE — 84681 ASSAY OF C-PEPTIDE: CPT

## 2023-12-29 PROCEDURE — 36415 COLL VENOUS BLD VENIPUNCTURE: CPT

## 2023-12-31 LAB
C PEPTIDE SERPL-MCNC: 1.8 NG/ML (ref 0.5–3.3)
INSULIN P FAST SERPL-ACNC: 11 UIU/ML (ref 3–25)

## 2024-01-16 ENCOUNTER — OFFICE VISIT (OUTPATIENT)
Dept: MEDICAL GROUP | Facility: CLINIC | Age: 24
End: 2024-01-16
Payer: MEDICAID

## 2024-01-16 VITALS
SYSTOLIC BLOOD PRESSURE: 110 MMHG | TEMPERATURE: 99.5 F | OXYGEN SATURATION: 99 % | DIASTOLIC BLOOD PRESSURE: 62 MMHG | HEART RATE: 92 BPM | HEIGHT: 66 IN | RESPIRATION RATE: 20 BRPM | WEIGHT: 140.87 LBS | BODY MASS INDEX: 22.64 KG/M2

## 2024-01-16 DIAGNOSIS — R63.0 LACK OF APPETITE: ICD-10-CM

## 2024-01-16 DIAGNOSIS — R11.0 NAUSEA: ICD-10-CM

## 2024-01-16 DIAGNOSIS — J01.40 ACUTE NON-RECURRENT PANSINUSITIS: ICD-10-CM

## 2024-01-16 DIAGNOSIS — R63.4 WEIGHT LOSS: ICD-10-CM

## 2024-01-16 DIAGNOSIS — J30.2 SEASONAL ALLERGIES: ICD-10-CM

## 2024-01-16 PROBLEM — R63.6 UNDERWEIGHT: Status: RESOLVED | Noted: 2022-03-21 | Resolved: 2024-01-16

## 2024-01-16 PROCEDURE — 99214 OFFICE O/P EST MOD 30 MIN: CPT | Performed by: PHYSICIAN ASSISTANT

## 2024-01-16 PROCEDURE — 3074F SYST BP LT 130 MM HG: CPT | Performed by: PHYSICIAN ASSISTANT

## 2024-01-16 PROCEDURE — 3078F DIAST BP <80 MM HG: CPT | Performed by: PHYSICIAN ASSISTANT

## 2024-01-16 RX ORDER — ONDANSETRON 4 MG/1
4 TABLET, ORALLY DISINTEGRATING ORAL EVERY 8 HOURS PRN
Qty: 20 TABLET | Refills: 5 | Status: SHIPPED | OUTPATIENT
Start: 2024-01-16

## 2024-01-16 RX ORDER — ALBUTEROL SULFATE 90 UG/1
2 AEROSOL, METERED RESPIRATORY (INHALATION) EVERY 6 HOURS PRN
Qty: 18 G | Refills: 2 | Status: SHIPPED | OUTPATIENT
Start: 2024-01-16

## 2024-01-16 RX ORDER — LORATADINE 10 MG/1
10 TABLET ORAL DAILY
Qty: 90 TABLET | Refills: 2 | Status: SHIPPED | OUTPATIENT
Start: 2024-01-16

## 2024-01-16 RX ORDER — AMOXICILLIN 500 MG/1
500 CAPSULE ORAL 3 TIMES DAILY
Qty: 30 CAPSULE | Refills: 0 | Status: SHIPPED | OUTPATIENT
Start: 2024-01-16 | End: 2024-03-11

## 2024-01-16 ASSESSMENT — PATIENT HEALTH QUESTIONNAIRE - PHQ9
5. POOR APPETITE OR OVEREATING: 3 - NEARLY EVERY DAY
CLINICAL INTERPRETATION OF PHQ2 SCORE: 1
SUM OF ALL RESPONSES TO PHQ QUESTIONS 1-9: 10

## 2024-01-16 ASSESSMENT — FIBROSIS 4 INDEX: FIB4 SCORE: 0.36

## 2024-01-16 NOTE — PROGRESS NOTES
cc:  hypoglycemia    Subjective:     Niki Riley is a 23 y.o. female presenting for hypoglycemia      Patient presents to the office for hypoglycemia.  At last visit patient indicated that she was having hyperglycemic events.  She is also wanting to conceive.  She states that she has been feeling increased dizziness lately and states that she has been having cold symptoms.  She states that she has been congested and wants to know if the illness could contribute to the dizziness. Patient states that she threw up the liquid for the glucose tolerance test 5 minutes into the test.   In the past she has had sludge in her gallbladder.  Patient states that she doesn't ever really fell hungry. Patient also has a narrow window where she feels comfortable.  This is when her sugars are between 100 and 120.      Review of systems:  See above.   Denies any symptoms unless previously indicated.        Current Outpatient Medications:     ondansetron (ZOFRAN ODT) 4 MG TABLET DISPERSIBLE, Take 1 Tablet by mouth every 8 hours as needed for Nausea/Vomiting., Disp: 20 Tablet, Rfl: 5    loratadine (CLARITIN) 10 MG Tab, Take 1 Tablet by mouth every day., Disp: 90 Tablet, Rfl: 2    albuterol 108 (90 Base) MCG/ACT Aero Soln inhalation aerosol, Inhale 2 Puffs every 6 hours as needed for Shortness of Breath., Disp: 18 g, Rfl: 2    amoxicillin (AMOXIL) 500 MG Cap, Take 1 Capsule by mouth 3 times a day., Disp: 30 Capsule, Rfl: 0    Blood Glucose Monitoring Suppl (ONETOUCH VERIO REFLECT) w/Device Kit, USE AS DIRECTED, Disp: , Rfl:     lamoTRIgine (LAMICTAL) 100 MG Tab, Take 100 mg by mouth at bedtime., Disp: , Rfl:     ONETOUCH VERIO strip, USE 1 STRIP TO CHECK GLUCOSE 4 TIMES DAILY, Disp: , Rfl:     Blood Glucose Test Strips, Use one One Touch Verio strip to test blood sugar four times daily., Disp: 400 Strip, Rfl: 2    Lancets, Use one One Touch Verio lancet to test blood sugar four times daily., Disp: 400 Each, Rfl: 2     "MIRENA, 52 MG, 20 MCG/DAY IUD, , Disp: , Rfl:     Blood Glucose Meter Kit, Test blood sugar as recommended by provider. One Touch Verio blood glucose monitoring kit., Disp: 1 Kit, Rfl: 0    Alcohol Swabs, Wipe site with prep pad prior to injection., Disp: 200 Each, Rfl: 2    traZODone (DESYREL) 50 MG Tab, Take 1 Tablet by mouth at bedtime as needed for Sleep. (Patient not taking: Reported on 12/20/2023), Disp: 90 Tablet, Rfl: 0    dicyclomine (BENTYL) 20 MG Tab, Take 1 Tablet by mouth every 6 hours. (Patient not taking: Reported on 12/20/2023), Disp: 120 Tablet, Rfl: 0    Allergies, past medical history, past surgical history, family history, social history reviewed and updated    Objective:     Vitals: /62 (BP Location: Left arm, Patient Position: Sitting, BP Cuff Size: Adult)   Pulse 92   Temp 37.5 °C (99.5 °F) (Temporal)   Resp 20   Ht 1.664 m (5' 5.5\")   Wt 63.9 kg (140 lb 14 oz)   SpO2 99%   BMI 23.09 kg/m²   General: Alert, pleasant, NAD  EYES:   PERRL, EOMI, no icterus or pallor.  Conjunctivae and lids normal.   HENT:  Normocephalic.  External ears normal. Minimal pain with sinus pressure.   Neck supple.     Respiratory: Normal respiratory effort.    Abdomen: Not obese  Skin: Warm, dry, no rashes.  Musculoskeletal: Gait is normal.  Moves all extremities well.    Extremities: normal range of motion all extremities.   Neurological: No tremors, sensation grossly intact, CN2-12 intact.  Psych:  Affect/mood is normal, judgement is good, memory is intact, grooming is appropriate.     Latest Reference Range & Units 12/29/23 09:01 12/29/23 09:02   Sodium 135 - 145 mmol/L 136    Potassium 3.6 - 5.5 mmol/L 3.9    Chloride 96 - 112 mmol/L 103    Co2 20 - 33 mmol/L 21    Anion Gap 7.0 - 16.0  12.0    Glucose 65 - 99 mg/dL 75    Bun 8 - 22 mg/dL 9    Creatinine 0.50 - 1.40 mg/dL 0.79    GFR (CKD-EPI) >60 mL/min/1.73 m 2 107    Calcium 8.5 - 10.5 mg/dL 9.3    Correct Calcium 8.5 - 10.5 mg/dL 9.0    AST(SGOT) " 12 - 45 U/L 13    ALT(SGPT) 2 - 50 U/L 10    Alkaline Phosphatase 30 - 99 U/L 49    Total Bilirubin 0.1 - 1.5 mg/dL 0.4    Albumin 3.2 - 4.9 g/dL 4.4    Total Protein 6.0 - 8.2 g/dL 7.5    Globulin 1.9 - 3.5 g/dL 3.1    A-G Ratio g/dL 1.4    Lipase 11 - 82 U/L 41    Amylase 20 - 103 U/L 78    Glycohemoglobin 4.0 - 5.6 % 4.9    Estim. Avg Glu mg/dL 94    C-Peptide 0.5 - 3.3 ng/mL 1.8    Insulin Fasting 3 - 25 uIU/mL  11       Assessment/Plan:     Niki was seen today for lab results and medication refill.    Diagnoses and all orders for this visit:    Nausea  -     CELIAC DISEASE AB PANEL; Future  -     T-TRANSGLUTAMINASE (TTG) IGA; Future  -     MPO/MO-3 (ANCA) ABS; Future  -     CALPROTECTIN,FECAL; Future  -     Sed Rate; Future  -     CRP HIGH SENSITIVE (CARDIAC); Future  -     YASMINE REFLEXIVE PROFILE; Future  -     IGA SERUM QUANT; Future  -     US-RUQ; Future  -     ondansetron (ZOFRAN ODT) 4 MG TABLET DISPERSIBLE; Take 1 Tablet by mouth every 8 hours as needed for Nausea/Vomiting.  Weight loss  -     CELIAC DISEASE AB PANEL; Future  -     T-TRANSGLUTAMINASE (TTG) IGA; Future  -     MPO/MO-3 (ANCA) ABS; Future  -     CALPROTECTIN,FECAL; Future  -     Sed Rate; Future  -     CRP HIGH SENSITIVE (CARDIAC); Future  -     YASMINE REFLEXIVE PROFILE; Future  -     IGA SERUM QUANT; Future  -     US-RUQ; Future  Lack of appetite  -     CELIAC DISEASE AB PANEL; Future  -     T-TRANSGLUTAMINASE (TTG) IGA; Future  -     MPO/MO-3 (ANCA) ABS; Future  -     CALPROTECTIN,FECAL; Future  -     Sed Rate; Future  -     CRP HIGH SENSITIVE (CARDIAC); Future  -     YASMINE REFLEXIVE PROFILE; Future  -     IGA SERUM QUANT; Future  -     US-RUQ; Future    Labs are normal.  Concern for celiac or there GI issue.  Labs have been ordered to evaluate further.  Will also evaluate patient's gall bladder.  Follow up in 6-8 weeks.  Nausea medication refilled.    Seasonal allergies  -     loratadine (CLARITIN) 10 MG Tab; Take 1 Tablet by mouth every  day.  -     albuterol 108 (90 Base) MCG/ACT Aero Soln inhalation aerosol; Inhale 2 Puffs every 6 hours as needed for Shortness of Breath.  Acute non-recurrent pansinusitis  -     amoxicillin (AMOXIL) 500 MG Cap; Take 1 Capsule by mouth 3 times a day.      Allergy meds refilled.  Believe she may have a sinus infection.  Will send amoxicillin.  Patient to deidre if symptoms worsen.        Return in about 6 weeks (around 2/27/2024), or if symptoms worsen or fail to improve, for 6-8 weeks follow up tests.    Please note that this dictation was created using voice recognition software. I have made every reasonable attempt to correct obvious errors, but expect that there are errors of grammar and possible content that I did not discover before finalizing note.

## 2024-03-05 ENCOUNTER — GYNECOLOGY VISIT (OUTPATIENT)
Dept: OBGYN | Facility: CLINIC | Age: 24
End: 2024-03-05
Payer: MEDICAID

## 2024-03-05 VITALS — DIASTOLIC BLOOD PRESSURE: 62 MMHG | BODY MASS INDEX: 23.11 KG/M2 | WEIGHT: 141 LBS | SYSTOLIC BLOOD PRESSURE: 112 MMHG

## 2024-03-05 DIAGNOSIS — Z30.432 ENCOUNTER FOR IUD REMOVAL: ICD-10-CM

## 2024-03-05 PROCEDURE — 3078F DIAST BP <80 MM HG: CPT | Performed by: OBSTETRICS & GYNECOLOGY

## 2024-03-05 PROCEDURE — 58301 REMOVE INTRAUTERINE DEVICE: CPT | Mod: GC | Performed by: OBSTETRICS & GYNECOLOGY

## 2024-03-05 PROCEDURE — 3074F SYST BP LT 130 MM HG: CPT | Performed by: OBSTETRICS & GYNECOLOGY

## 2024-03-05 ASSESSMENT — FIBROSIS 4 INDEX: FIB4 SCORE: 0.56

## 2024-03-05 NOTE — PROCEDURES
IUD Intrauterine Device Removal Procedure Note    PRE-OP DIAGNOSIS: IUD removal    POST-OP DIAGNOSIS: Same     PROCEDURE: IUD removal    Performing Physician: Eladio Nascimento MD  Supervising Physician: Mone Fitch MD       PROCEDURE:   The speculum was placed and the IUD string visualized.  Using ringed forceps, the IUD string was grasped and the device was removed without difficulty.  Bleeding was minimal.     Followup: The patient tolerated the procedure well without complications.  Standard post-procedure care is explained and return precautions are given.    Preceptor, Dr Fitch was present throughout the procedure.    Teto Nascimento, PGY-2  [facility]

## 2024-03-05 NOTE — PROGRESS NOTES
SUBJECTIVE:     CC: IUD removal    HPI:   Niki presents today with her fiance for IUD removal. She is a  mother of 8yo, who is planning of having another child with her fiance. Patient currently does not take prenatal vitamins. She expresses no other concerns today.    Past Medical History:  Past Medical History:   Diagnosis Date    Shoulder pain 2014    Asthma     Migraine     Urinary tract infection        Patient Active Problem List:  Patient Active Problem List   Diagnosis    Mild episode of recurrent major depressive disorder (HCC)    Chronic midline low back pain without sciatica    Mild intermittent asthma without complication    Eczema    Cyst of right ovary    Myalgia of muscle of neck    Drug-induced sexual dysfunction (HCC)    Pain of right scapula    Hospital discharge follow-up    Anxiety    Palpitations    Nausea    Weight loss    Hypoglycemia    Breast lump    Breast pain    IUD complication (HCC)    Anger reaction    PTSD (post-traumatic stress disorder)    IUD (intrauterine device) in place    Seasonal allergies    Adolescent idiopathic scoliosis of thoracolumbar region    Other insomnia    Abnormal glucose    Ear pain, left    Lack of appetite    Acute non-recurrent pansinusitis       Surgical History:  Past Surgical History:   Procedure Laterality Date    PELVISCOPY  2018    Procedure: PELVISCOPY-  OVARIAN CYSTECTOMY;  Surgeon: Kathleen Chen M.D.;  Location: SURGERY SAME DAY [facility];  Service: Gynecology    GASTROSCOPY  2014    Performed by Luis Felipe Decker M.D. at SURGERY [facility]       Family History:  Family History   Problem Relation Age of Onset    Anxiety disorder Mother     Heart Disease Maternal Grandfather        Social History:  Social History     Tobacco Use    Smoking status: Never    Smokeless tobacco: Never    Tobacco comments:     vapes   Vaping Use    Vaping Use: Every day    Substances: Flavoring    Devices: Refillable tank   Substance Use  Topics    Alcohol use: Yes     Comment: occ    Drug use: No       Medications:  Current Outpatient Medications on File Prior to Visit   Medication Sig Dispense Refill    ondansetron (ZOFRAN ODT) 4 MG TABLET DISPERSIBLE Take 1 Tablet by mouth every 8 hours as needed for Nausea/Vomiting. 20 Tablet 5    loratadine (CLARITIN) 10 MG Tab Take 1 Tablet by mouth every day. 90 Tablet 2    albuterol 108 (90 Base) MCG/ACT Aero Soln inhalation aerosol Inhale 2 Puffs every 6 hours as needed for Shortness of Breath. 18 g 2    amoxicillin (AMOXIL) 500 MG Cap Take 1 Capsule by mouth 3 times a day. 30 Capsule 0    Blood Glucose Monitoring Suppl (ONETOUCH VERIO REFLECT) w/Device Kit USE AS DIRECTED      lamoTRIgine (LAMICTAL) 100 MG Tab Take 100 mg by mouth at bedtime.      ONETOUCH VERIO strip USE 1 STRIP TO CHECK GLUCOSE 4 TIMES DAILY      Blood Glucose Test Strips Use one One Touch Verio strip to test blood sugar four times daily. 400 Strip 2    Lancets Use one One Touch Verio lancet to test blood sugar four times daily. 400 Each 2    MIRENA, 52 MG, 20 MCG/DAY IUD       Blood Glucose Meter Kit Test blood sugar as recommended by provider. One Touch Verio blood glucose monitoring kit. 1 Kit 0    Alcohol Swabs Wipe site with prep pad prior to injection. 200 Each 2    traZODone (DESYREL) 50 MG Tab Take 1 Tablet by mouth at bedtime as needed for Sleep. (Patient not taking: Reported on 12/20/2023) 90 Tablet 0    dicyclomine (BENTYL) 20 MG Tab Take 1 Tablet by mouth every 6 hours. (Patient not taking: Reported on 12/20/2023) 120 Tablet 0     No current facility-administered medications on file prior to visit.       Allergies   Allergen Reactions    Sulfa Drugs     Honey Dew     Kiwi Extract Anaphylaxis     Clarified by dietary staff    Lime Food Allergy Hives     Rash     Other Misc Swelling     Hair dye          ROS:   Gen: no fevers/chills, no changes in weight  Eyes: no changes in vision  ENT: no changes in hearing  Pulm: no sob, no  cough  CV: no chest pain, no palpitations  GI: no nausea/vomiting, no diarrhea  Neuro: no headaches, no numbness/tingling      OBJECTIVE:     Exam:  /62 (BP Location: Right arm, Patient Position: Sitting)   Wt 141 lb   BMI 23.11 kg/m²  Body mass index is 23.11 kg/m².    Gen: Alert and oriented, No apparent distress.  Head:  NCAT, EOMI, sclera clear without discharge  Lungs: Normal effort  CV: Regular rate and rhythm. No murmurs, rubs, or gallops.  Abd:   Non-distended, soft  Ext: No clubbing, cyanosis, edema.  Psych: normal mood and affect      ASSESSMENT & PLAN:     23 y.o. female with the following -    Problem List Items Addressed This Visit      Encounter for IUD removal      Patient desires to have IUD removed today as she and her fiance plan to have another child. See procedure note below.  - Recommend starting prenatal vitamins.     Relevant Orders   Consent for all Surgical, Special Diagnostic or Therapeutic Procedures       IUD Intrauterine Device Removal Procedure Note    PRE-OP DIAGNOSIS: IUD removal    POST-OP DIAGNOSIS: Same     PROCEDURE: IUD removal    Performing Physician: Eladio Nascimento MD  Supervising Physician: Mone Fitch MD       PROCEDURE:   The speculum was placed and the IUD string visualized. Using ringed forceps, the IUD string was grasped and the device was removed without difficulty. Bleeding was minimal.     Followup: The patient tolerated the procedure well without complications. Standard post-procedure care is explained and return precautions are given.   Patient encouraged to start taking prenatal vitamins as she desires to start trying for conception as soon as possible.    Preceptor, Dr Fitch was present throughout the procedure.    Teto Nascimento, PGY-2  [facility]

## 2024-03-11 ENCOUNTER — OFFICE VISIT (OUTPATIENT)
Dept: MEDICAL GROUP | Facility: CLINIC | Age: 24
End: 2024-03-11
Payer: MEDICAID

## 2024-03-11 VITALS
OXYGEN SATURATION: 99 % | HEIGHT: 66 IN | TEMPERATURE: 99.1 F | DIASTOLIC BLOOD PRESSURE: 78 MMHG | SYSTOLIC BLOOD PRESSURE: 116 MMHG | HEART RATE: 91 BPM | RESPIRATION RATE: 18 BRPM | BODY MASS INDEX: 22.99 KG/M2 | WEIGHT: 143.08 LBS

## 2024-03-11 DIAGNOSIS — R11.0 NAUSEA: ICD-10-CM

## 2024-03-11 DIAGNOSIS — R10.84 GENERALIZED ABDOMINAL PAIN: ICD-10-CM

## 2024-03-11 PROCEDURE — 3074F SYST BP LT 130 MM HG: CPT | Performed by: PHYSICIAN ASSISTANT

## 2024-03-11 PROCEDURE — 3078F DIAST BP <80 MM HG: CPT | Performed by: PHYSICIAN ASSISTANT

## 2024-03-11 PROCEDURE — 99213 OFFICE O/P EST LOW 20 MIN: CPT | Performed by: PHYSICIAN ASSISTANT

## 2024-03-11 RX ORDER — RIZATRIPTAN BENZOATE 10 MG/1
10 TABLET ORAL
COMMUNITY
Start: 2024-02-25

## 2024-03-11 RX ORDER — LAMOTRIGINE 100 MG/1
100 TABLET ORAL
COMMUNITY
End: 2024-03-11

## 2024-03-11 ASSESSMENT — FIBROSIS 4 INDEX: FIB4 SCORE: 0.56

## 2024-03-11 NOTE — PROGRESS NOTES
"cc:  follow up testing    Subjective:     Niki Riley is a 23 y.o. female presenting for follow up testing      Patient presents to the office for follow up testing.  Patient presents to the office today and is here to follow up with testing.  Previous labs that I have ordered have not been drawn.   She states that she is still having the pain when she eats.  She is concerned about celiac.  Zofran has helped some.  She has needed at least 2-3 times a week.      She states that she has had her IUD removed.  She was also told to take a prenatal vitamin    Review of systems:  See above.   Denies any symptoms unless previously indicated.        Current Outpatient Medications:     rizatriptan (MAXALT) 10 MG tablet, Take 10 mg by mouth., Disp: , Rfl:     ondansetron (ZOFRAN ODT) 4 MG TABLET DISPERSIBLE, Take 1 Tablet by mouth every 8 hours as needed for Nausea/Vomiting., Disp: 20 Tablet, Rfl: 5    loratadine (CLARITIN) 10 MG Tab, Take 1 Tablet by mouth every day., Disp: 90 Tablet, Rfl: 2    albuterol 108 (90 Base) MCG/ACT Aero Soln inhalation aerosol, Inhale 2 Puffs every 6 hours as needed for Shortness of Breath., Disp: 18 g, Rfl: 2    Blood Glucose Monitoring Suppl (ONETOUCH VERIO REFLECT) w/Device Kit, USE AS DIRECTED, Disp: , Rfl:     ONETOUCH VERIO strip, USE 1 STRIP TO CHECK GLUCOSE 4 TIMES DAILY, Disp: , Rfl:     Blood Glucose Test Strips, Use one One Touch Verio strip to test blood sugar four times daily., Disp: 400 Strip, Rfl: 2    Blood Glucose Meter Kit, Test blood sugar as recommended by provider. One Touch Verio blood glucose monitoring kit., Disp: 1 Kit, Rfl: 0    Alcohol Swabs, Wipe site with prep pad prior to injection., Disp: 200 Each, Rfl: 2    Allergies, past medical history, past surgical history, family history, social history reviewed and updated        Objective:     Vitals: /78   Pulse 91   Temp 37.3 °C (99.1 °F) (Temporal)   Resp 18   Ht 1.676 m (5' 6\")   Wt 64.9 kg (143 " lb 1.3 oz)   SpO2 99%   BMI 23.09 kg/m²   General: Alert, pleasant, NAD  EYES:   PERRL, EOMI, no icterus or pallor.  Conjunctivae and lids normal.   HENT:  Normocephalic.  External ears normal.   Neck supple.     Respiratory: Normal respiratory effort.    Abdomen: Not obese  Skin: Warm, dry, no rashes.  Musculoskeletal: Gait is normal.  Moves all extremities well.    Extremities: normal range of motion all extremities.   Neurological: No tremors, sensation grossly intact,CN2-12 intact.  Psych:  Affect/mood is normal, judgement is good, memory is intact, grooming is appropriate.    1.  Unremarkable right upper quadrant sonogram.    Electronically Signed by: Modesto Marte MD 2/27/2024 11:06 AM  Narrative    EXAM: Limited right upper quadrant sonogram.    TECHNIQUE: Routine sonographic examination of the right upper quadrant was  performed.    HISTORY: anorexia    COMPARISON: Abdomen and pelvis CT February 27, 2023.    FINDINGS:      Liver: Liver measures 15.6 cm in length. The liver is normal in size and  echotexture. No liver mass is noted. The main portal vein is patent with  hepatopedal flow.    Biliary: No biliary dilatation or choledocholithiasis noted. Common bile duct is  within normal limits in caliber.    Gallbladder: No gallstones, pericholecystic fluid, or gallbladder wall  thickening is noted. The sonographic Manzanares's sign was negative.    Pancreas: Unremarkable.    Right kidney: No hydronephrosis or solid mass is noted. Normal in size and  echotexture. Right kidney measures 9.4 cm x 3.8 cm x 4.7 cm.    Other: Visualized portions of the aorta and IVC are unremarkable.     Latest Reference Range & Units 02/25/24 14:03   RBC 4.19 - 5.21 x10e6/uL 4.48 (E)   Hemoglobin 12.3 - 16.0 g/dL 13.0 (E)   Hematocrit 36.0 - 47.0 % 37.8 (E)   MCV 81.0 - 99.0 fL 84.3 (E)   MCH 28.0 - 33.8 pg 29.1 (E)   MCHC 33.1 - 36.5 g/dL 34.5 (E)   RDW 11.8 - 14.0 % 12.5 (E)   Platelet Count 146 - 390 x10E3/uL 169 (E)   MPV 6.4  - 10.2 fL 8.1 (E)   Neutrophils-Polys 41.7 - 82.3 % 26.5 (L) (E)   Neutrophils (Absolute) 1.40 - 8.00 x10E3/uL 1.10 (L) (E)   Lymphocytes 10.8 - 44.4 % 43.8 (E)   Lymphs (Absolute) 1.00 - 5.20 x10E3/uL 1.80 (E)   Monocytes 5.0 - 12.8 % 27.1 (H) (E)   Monos (Absolute) 0.16 - 1.00 x10E3/uL 1.10 (H) (E)   Eosinophils 0.0 - 6.6 % 2.0 (E)   Eos (Absolute) 0.00 - 0.80 x10E3/uL 0.10 (E)   Basophils 0.0 - 1.3 % 0.6 (E)   Baso (Absolute) 0.00 - 0.30 x10E3/uL 0.00 (E)   Leukocytes, Absolute 3.7 - 10.6 x10E3/uL 4.2 (E)   (L): Data is abnormally low  (H): Data is abnormally high  (E): External lab result    Assessment/Plan:     Niki was seen today for lab results.    Diagnoses and all orders for this visit:    Generalized abdominal pain  -     Celiac Disease HLA-DQ Genotyping; Future  -     IGA SERUM QUANT; Future  -     YASMINE REFLEXIVE PROFILE; Future  -     MPO/AK-3 (ANCA) ABS; Future  -     CRP HIGH SENSITIVE (CARDIAC); Future  -     Sed Rate; Future  -     CALPROTECTIN,FECAL; Future  -     T-TRANSGLUTAMINASE (TTG) IGA; Future  -     CELIAC DISEASE AB PANEL; Future  -     FOOD #1 (61)    Nausea  -     Celiac Disease HLA-DQ Genotyping; Future  -     IGA SERUM QUANT; Future  -     YASMINE REFLEXIVE PROFILE; Future  -     MPO/AK-3 (ANCA) ABS; Future  -     CRP HIGH SENSITIVE (CARDIAC); Future  -     Sed Rate; Future  -     CALPROTECTIN,FECAL; Future  -     T-TRANSGLUTAMINASE (TTG) IGA; Future  -     CELIAC DISEASE AB PANEL; Future  -     FOOD #1 (61)      Although patient did have lab work, there are tests that were needed and not drawn.  Therefore we will reorder and patient will have testing done as soon as she is able.  We will follow-up with test results when received.  Will also add allergy testing.  In the meantime, patient will try to eat gluten-free or decrease gluten diet is much as possible.      No follow-ups on file.    Please note that this dictation was created using voice recognition software. I have made every  reasonable attempt to correct obvious errors, but expect that there are errors of grammar and possible content that I did not discover before finalizing note.

## 2024-04-26 ENCOUNTER — OFFICE VISIT (OUTPATIENT)
Dept: URGENT CARE | Facility: PHYSICIAN GROUP | Age: 24
End: 2024-04-26
Payer: MEDICAID

## 2024-04-26 VITALS
RESPIRATION RATE: 20 BRPM | BODY MASS INDEX: 23.05 KG/M2 | HEIGHT: 66 IN | OXYGEN SATURATION: 100 % | HEART RATE: 94 BPM | TEMPERATURE: 98.3 F | DIASTOLIC BLOOD PRESSURE: 70 MMHG | WEIGHT: 143.4 LBS | SYSTOLIC BLOOD PRESSURE: 118 MMHG

## 2024-04-26 DIAGNOSIS — N92.6 IRREGULAR MENSES: ICD-10-CM

## 2024-04-26 LAB
APPEARANCE UR: NORMAL
BILIRUB UR STRIP-MCNC: NORMAL MG/DL
COLOR UR AUTO: YELLOW
GLUCOSE UR STRIP.AUTO-MCNC: NORMAL MG/DL
KETONES UR STRIP.AUTO-MCNC: NORMAL MG/DL
LEUKOCYTE ESTERASE UR QL STRIP.AUTO: NORMAL
NITRITE UR QL STRIP.AUTO: NORMAL
PH UR STRIP.AUTO: 6.5 [PH] (ref 5–8)
POCT INT CON NEG: NEGATIVE
POCT INT CON POS: POSITIVE
POCT URINE PREGNANCY TEST: NEGATIVE
PROT UR QL STRIP: NORMAL MG/DL
RBC UR QL AUTO: NORMAL
SP GR UR STRIP.AUTO: 1.01
UROBILINOGEN UR STRIP-MCNC: 0.2 MG/DL

## 2024-04-26 PROCEDURE — 3078F DIAST BP <80 MM HG: CPT | Performed by: FAMILY MEDICINE

## 2024-04-26 PROCEDURE — 99213 OFFICE O/P EST LOW 20 MIN: CPT | Mod: 25 | Performed by: FAMILY MEDICINE

## 2024-04-26 PROCEDURE — 81002 URINALYSIS NONAUTO W/O SCOPE: CPT | Performed by: FAMILY MEDICINE

## 2024-04-26 PROCEDURE — 3074F SYST BP LT 130 MM HG: CPT | Performed by: FAMILY MEDICINE

## 2024-04-26 PROCEDURE — 81025 URINE PREGNANCY TEST: CPT | Performed by: FAMILY MEDICINE

## 2024-04-26 ASSESSMENT — FIBROSIS 4 INDEX: FIB4 SCORE: 0.56

## 2024-04-26 NOTE — PROGRESS NOTES
"Subjective     Niki Riley is a 23 y.o. female who presents with Pregnancy Test (Pt sts she has been doing tests at home and they have been positive and negative, pt has been taking them for about a week )            LMP 2 weeks. Irregular/has Mirena IUD.  She has had a positive home pregnancy test.  Mild breast tenderness is not uncommon for her around menstrual cycle.  No pelvic pain.  No bleeding.  No urinary symptoms.  No other aggravating or alleviating factors.        Review of Systems   Constitutional:  Negative for malaise/fatigue and weight loss.   Eyes:  Negative for discharge and redness.   Gastrointestinal:  Negative for nausea and vomiting.   Musculoskeletal:  Negative for joint pain and myalgias.   Skin:  Negative for itching and rash.              Objective     /70 (BP Location: Left arm, Patient Position: Sitting, BP Cuff Size: Adult)   Pulse 94   Temp 36.8 °C (98.3 °F) (Temporal)   Resp 20   Ht 1.676 m (5' 6\")   Wt 65 kg (143 lb 6.4 oz)   SpO2 100%   BMI 23.15 kg/m²      Physical Exam  Constitutional:       General: She is not in acute distress.     Appearance: She is well-developed.   HENT:      Head: Normocephalic and atraumatic.   Eyes:      Conjunctiva/sclera: Conjunctivae normal.   Cardiovascular:      Rate and Rhythm: Normal rate and regular rhythm.      Heart sounds: Normal heart sounds. No murmur heard.  Pulmonary:      Effort: Pulmonary effort is normal.      Breath sounds: Normal breath sounds. No wheezing.   Abdominal:      Palpations: Abdomen is soft.      Comments: No suprapubic tenderness   Skin:     General: Skin is warm and dry.      Findings: No rash.   Neurological:      Mental Status: She is alert.                             Assessment & Plan   Poct pregnancy negative  Poct ua reviewed       1. Irregular menses    Differential diagnosis, natural history, supportive care, and indications for immediate follow-up were discussed.     Pregnancy negative " here.  She may repeat at home in 1 to 2 weeks.  She understands to follow-up with pain, bleeding, or fever.

## 2024-04-28 ASSESSMENT — ENCOUNTER SYMPTOMS
EYE DISCHARGE: 0
NAUSEA: 0
MYALGIAS: 0
WEIGHT LOSS: 0
VOMITING: 0
EYE REDNESS: 0

## 2024-05-24 ENCOUNTER — OFFICE VISIT (OUTPATIENT)
Dept: URGENT CARE | Facility: PHYSICIAN GROUP | Age: 24
End: 2024-05-24
Payer: MEDICAID

## 2024-05-24 ENCOUNTER — APPOINTMENT (OUTPATIENT)
Dept: RADIOLOGY | Facility: IMAGING CENTER | Age: 24
End: 2024-05-24
Payer: MEDICAID

## 2024-05-24 VITALS
SYSTOLIC BLOOD PRESSURE: 138 MMHG | BODY MASS INDEX: 22.98 KG/M2 | WEIGHT: 143 LBS | HEIGHT: 66 IN | HEART RATE: 85 BPM | RESPIRATION RATE: 20 BRPM | DIASTOLIC BLOOD PRESSURE: 72 MMHG | OXYGEN SATURATION: 99 % | TEMPERATURE: 97.1 F

## 2024-05-24 DIAGNOSIS — S69.92XA INJURY OF FINGER OF LEFT HAND, INITIAL ENCOUNTER: ICD-10-CM

## 2024-05-24 DIAGNOSIS — S63.602A SPRAIN OF LEFT THUMB, UNSPECIFIED SITE OF DIGIT, INITIAL ENCOUNTER: ICD-10-CM

## 2024-05-24 PROCEDURE — 73140 X-RAY EXAM OF FINGER(S): CPT | Mod: TC,FY,LT | Performed by: RADIOLOGY

## 2024-05-24 PROCEDURE — 99213 OFFICE O/P EST LOW 20 MIN: CPT

## 2024-05-24 PROCEDURE — 3078F DIAST BP <80 MM HG: CPT

## 2024-05-24 PROCEDURE — 3075F SYST BP GE 130 - 139MM HG: CPT

## 2024-05-24 RX ORDER — IBUPROFEN 200 MG
800 TABLET ORAL ONCE
Status: COMPLETED | OUTPATIENT
Start: 2024-05-24 | End: 2024-05-24

## 2024-05-24 RX ADMIN — Medication 800 MG: at 17:38

## 2024-05-24 ASSESSMENT — FIBROSIS 4 INDEX: FIB4 SCORE: .5838051064926238767

## 2024-05-25 NOTE — PATIENT INSTRUCTIONS
Ibuprofen 800 mg every 6-8 hours a needed (no more than 3200 mg in 24 hours)  Tylenol 1000 mg every 4-6 hours as needed (no more than 4000 mg in 24 hours)

## 2024-05-25 NOTE — PROGRESS NOTES
Subjective:   Niki Riley is a very pleasant 24 y.o. female who presents for:    Chief Complaint   Patient presents with    Hand Injury     Pt smashed left hand in a walk in freezer door 30 mins ago  throbbing pain        HPI:    Hand Injury  Episode onset: the patient was at work today, when she slammed her left thumb in the freezer thumb. Associated symptoms comments: The patient reports a burning and throbbing, constant, sharp pain that starts at top of the left thumb that radiates to the base of the thumb. Decreased ROM with flexion of the finger. No wrist pain, numbness, tingling.. She has tried ice for the symptoms.       ROS:    Review of Systems   Musculoskeletal:         Left thumb pain       Medications:      Current Outpatient Medications   Medication Sig    ondansetron (ZOFRAN ODT) 4 MG TABLET DISPERSIBLE Take 1 Tablet by mouth every 8 hours as needed for Nausea/Vomiting.    loratadine (CLARITIN) 10 MG Tab Take 1 Tablet by mouth every day.    albuterol 108 (90 Base) MCG/ACT Aero Soln inhalation aerosol Inhale 2 Puffs every 6 hours as needed for Shortness of Breath.    Blood Glucose Monitoring Suppl (ONETOUCH VERIO REFLECT) w/Device Kit USE AS DIRECTED    ONETOUCH VERIO strip USE 1 STRIP TO CHECK GLUCOSE 4 TIMES DAILY    Blood Glucose Test Strips Use one One Touch Verio strip to test blood sugar four times daily.    Blood Glucose Meter Kit Test blood sugar as recommended by provider. One Touch Verio blood glucose monitoring kit.    Alcohol Swabs Wipe site with prep pad prior to injection.       Allergies:     Allergies   Allergen Reactions    Sulfa Drugs     Honey Dew     Kiwi Extract Anaphylaxis     Clarified by dietary staff    Lime Food Allergy Hives     Rash     Other Misc Swelling     Hair dye        Problem List:     Patient Active Problem List   Diagnosis    Mild episode of recurrent major depressive disorder (HCC)    Chronic midline low back pain without sciatica    Mild intermittent  asthma without complication    Eczema    Cyst of right ovary    Myalgia of muscle of neck    Drug-induced sexual dysfunction (HCC)    Pain of right scapula    Hospital discharge follow-up    Anxiety    Palpitations    Nausea    Weight loss    Hypoglycemia    Breast lump    Breast pain    IUD complication (HCC)    Anger reaction    PTSD (post-traumatic stress disorder)    IUD (intrauterine device) in place    Seasonal allergies    Adolescent idiopathic scoliosis of thoracolumbar region    Other insomnia    Abnormal glucose    Ear pain, left    Lack of appetite    Acute non-recurrent pansinusitis    Generalized abdominal pain       Surgical History:    Past Surgical History:   Procedure Laterality Date    PELVISCOPY  9/5/2018    Procedure: PELVISCOPY-  OVARIAN CYSTECTOMY;  Surgeon: Kathleen Chen M.D.;  Location: SURGERY SAME DAY [facility];  Service: Gynecology    GASTROSCOPY  5/24/2014    Performed by Luis Felipe Decker M.D. at SURGERY [place] [facility]       Past Social Hx:     Social History     Socioeconomic History    Marital status: Other   Tobacco Use    Smoking status: Never    Smokeless tobacco: Never    Tobacco comments:     vapes   Vaping Use    Vaping status: Every Day    Substances: Flavoring    Devices: Refillable tank   Substance and Sexual Activity    Alcohol use: Yes     Comment: occ    Drug use: No    Sexual activity: Yes     Partners: Male     Birth control/protection: None     Social Determinants of Health     Intimate Partner Violence: Low Risk  (2/25/2024)    Received from [facility]    History of Abuse     Have you ever been afraid of, threatened, neglected, or abused by someone?: No        Past Family Hx:      Family History   Problem Relation Age of Onset    Anxiety disorder Mother     Heart Disease Maternal Grandfather        Problem list, medications, and allergies reviewed by myself today in Epic.     Objective:     Vitals:    05/24/24 1724   BP: 138/72   Pulse: 85   Resp:  20   Temp: 36.2 °C (97.1 °F)   SpO2: 99%       Physical Exam  Vitals reviewed.   Constitutional:       General: She is not in acute distress.     Appearance: Normal appearance. She is normal weight. She is not ill-appearing or toxic-appearing.   Musculoskeletal:      Left wrist: Normal. No tenderness, bony tenderness or snuff box tenderness. Normal range of motion.        Hands:       Comments: LEFT hand/finger  General: no gross deformity, ecchymosis, or erythema  Palpation: TTP over distal aspect of the left first finger  ROM: FROM throughout   Strength: 2/5 flexion secondary to swelling/pain, 5/5 extension, 5/5   Neuro: median, radial, ulnar nerves intact on testing  Vascular: radial, ulnar pulses 2+, cap refill <2 sec    Neurological:      Mental Status: She is alert.       X-ray images viewed and interpreted by APRN, confirmed by radiology:        RADIOLOGY RESULTS   DX-FINGER(S) 2+ LEFT    Result Date: 5/24/2024 5/24/2024 5:39 PM HISTORY/REASON FOR EXAM:  Pain/Deformity Following Trauma; Pain at the distal aspect of the left thumb after traumatic injury. TECHNIQUE/EXAM DESCRIPTION AND NUMBER OF VIEWS:  3 views of the LEFT fingers. COMPARISON: None FINDINGS: No focal soft tissue swelling. No fracture or dislocation. No radiopaque foreign body.     No fracture or dislocation of LEFT thumb.             Assessment/Plan:     Diagnosis and associated orders:     1. Injury of finger of left hand, initial encounter  - DX-FINGER(S) 2+ LEFT; Future  - ibuprofen (Motrin) tablet 800 mg  - Thumb SPICA    2. Sprain of left thumb, unspecified site of digit, initial encounter  - Thumb SPICA        Comments/MDM:     X-ray negative for acute osseous abnormality, including fracture or dislocation  Based on the patient's mechanism of injury, it is highly likely that the patient experienced a sprain of the left thumb/possible contusion  Thumb spica splint provided in clinic  800 mg oral ibuprofen administered  Discussed  supportive measures, including RICE therapy, over-the-counter analgesics, avoidance of overusing the thumb as this may lead to worsening injury.  At the time of physical exam, the patient is neurovascularly intact.  Signs and symptoms that would warrant immediate evaluation in the ED/UC reviewed with the patient and her  at length         All questions answered. Patient verbalized understanding and is in agreement with this plan of care.     If symptoms are worsening or not improving in 3-5 days, follow-up with PCP or return to UC. Differential diagnosis, natural history, and supportive care discussed. AVS handout given and reviewed with patient. Patient educated on red flags and when to seek treatment back in ED or UC.     I personally reviewed prior external notes and test results pertinent to today's visit.  I have independently reviewed and interpreted all diagnostics ordered during this urgent care visit.     This dictation has been created using voice recognition software. The accuracy of the dictation is limited by the abilities of the software. I expect there may be some errors of grammar and possibly content. I made every attempt to manually correct the errors within my dictation. However, errors related to voice recognition software may still exist and should be interpreted within the appropriate context.    This note was electronically signed by PILY Barrera

## 2024-05-29 ENCOUNTER — APPOINTMENT (OUTPATIENT)
Dept: MEDICAL GROUP | Facility: CLINIC | Age: 24
End: 2024-05-29
Payer: MEDICAID

## 2024-06-17 ENCOUNTER — OFFICE VISIT (OUTPATIENT)
Dept: URGENT CARE | Facility: PHYSICIAN GROUP | Age: 24
End: 2024-06-17
Payer: MEDICAID

## 2024-06-17 ENCOUNTER — APPOINTMENT (OUTPATIENT)
Dept: RADIOLOGY | Facility: IMAGING CENTER | Age: 24
End: 2024-06-17
Payer: MEDICAID

## 2024-06-17 VITALS
BODY MASS INDEX: 22.76 KG/M2 | OXYGEN SATURATION: 95 % | RESPIRATION RATE: 16 BRPM | HEIGHT: 66 IN | DIASTOLIC BLOOD PRESSURE: 78 MMHG | TEMPERATURE: 97.8 F | HEART RATE: 68 BPM | SYSTOLIC BLOOD PRESSURE: 126 MMHG | WEIGHT: 141.6 LBS

## 2024-06-17 DIAGNOSIS — S69.91XA SOFT TISSUE INJURY OF RIGHT HAND, INITIAL ENCOUNTER: ICD-10-CM

## 2024-06-17 DIAGNOSIS — S69.91XA INJURY OF RIGHT HAND, INITIAL ENCOUNTER: ICD-10-CM

## 2024-06-17 PROCEDURE — 99213 OFFICE O/P EST LOW 20 MIN: CPT

## 2024-06-17 PROCEDURE — 3074F SYST BP LT 130 MM HG: CPT

## 2024-06-17 PROCEDURE — 73130 X-RAY EXAM OF HAND: CPT | Mod: TC,FY,RT | Performed by: RADIOLOGY

## 2024-06-17 PROCEDURE — 3078F DIAST BP <80 MM HG: CPT

## 2024-06-17 ASSESSMENT — FIBROSIS 4 INDEX: FIB4 SCORE: .5838051064926238767

## 2024-06-17 NOTE — PROGRESS NOTES
Subjective:   Niki Riley is a very pleasant 24 y.o. female who presents for:    Chief Complaint   Patient presents with    Hand Pain     R hand- slammed in the door. Hurts to open, or touch       HPI:    Hand Injury  Episode onset: yesterday, the patient slammed her hand in her front door. She reports that she is having pain in the palmar aspect of the right hand. The pain is worse with palpation, bending the hand. Associated symptoms comments: Bruising present on the palm. Denies N/T in the fingers, wrist. No open wound. She has tried NSAIDs (wrapped the hand, ice) for the symptoms. The treatment provided mild relief.     The patient is right hand dominant.    ROS:    Review of Systems   Musculoskeletal:         Right hand injury       Medications:      Current Outpatient Medications   Medication Sig    ondansetron (ZOFRAN ODT) 4 MG TABLET DISPERSIBLE Take 1 Tablet by mouth every 8 hours as needed for Nausea/Vomiting.    loratadine (CLARITIN) 10 MG Tab Take 1 Tablet by mouth every day.    albuterol 108 (90 Base) MCG/ACT Aero Soln inhalation aerosol Inhale 2 Puffs every 6 hours as needed for Shortness of Breath.    Blood Glucose Monitoring Suppl (ONETOUCH VERIO REFLECT) w/Device Kit USE AS DIRECTED    ONETOUCH VERIO strip USE 1 STRIP TO CHECK GLUCOSE 4 TIMES DAILY    Blood Glucose Test Strips Use one One Touch Verio strip to test blood sugar four times daily.    Blood Glucose Meter Kit Test blood sugar as recommended by provider. One Touch Verio blood glucose monitoring kit.    Alcohol Swabs Wipe site with prep pad prior to injection.       Allergies:     Allergies   Allergen Reactions    Sulfa Drugs     Honey Dew     Kiwi Extract Anaphylaxis     Clarified by dietary staff    Lime Food Allergy Hives     Rash     Other Misc Swelling     Hair dye        Problem List:     Patient Active Problem List   Diagnosis    Mild episode of recurrent major depressive disorder (HCC)    Chronic midline low back pain  without sciatica    Mild intermittent asthma without complication    Eczema    Cyst of right ovary    Myalgia of muscle of neck    Drug-induced sexual dysfunction (HCC)    Pain of right scapula    Hospital discharge follow-up    Anxiety    Palpitations    Nausea    Weight loss    Hypoglycemia    Breast lump    Breast pain    IUD complication (HCC)    Anger reaction    PTSD (post-traumatic stress disorder)    IUD (intrauterine device) in place    Seasonal allergies    Adolescent idiopathic scoliosis of thoracolumbar region    Other insomnia    Abnormal glucose    Ear pain, left    Lack of appetite    Acute non-recurrent pansinusitis    Generalized abdominal pain       Surgical History:    Past Surgical History:   Procedure Laterality Date    PELVISCOPY  9/5/2018    Procedure: PELVISCOPY-  OVARIAN CYSTECTOMY;  Surgeon: Kathleen Chen M.D.;  Location: SURGERY SAME DAY [facility];  Service: Gynecology    GASTROSCOPY  5/24/2014    Performed by Luis Felipe Decker M.D. at SURGERY [facility]       Past Social Hx:     Social History     Socioeconomic History    Marital status: Other   Tobacco Use    Smoking status: Never    Smokeless tobacco: Never    Tobacco comments:     vapes   Vaping Use    Vaping status: Every Day    Substances: Flavoring    Devices: Refillable tank   Substance and Sexual Activity    Alcohol use: Yes     Comment: occ    Drug use: No    Sexual activity: Yes     Partners: Male     Birth control/protection: None     Social Determinants of Health     Intimate Partner Violence: Low Risk  (2/25/2024)    Received from [facility]    History of Abuse     Have you ever been afraid of, threatened, neglected, or abused by someone?: No        Past Family Hx:      Family History   Problem Relation Age of Onset    Anxiety disorder Mother     Heart Disease Maternal Grandfather        Problem list, medications, and allergies reviewed by myself today in Epic.     Objective:     Vitals:    06/17/24  1121   BP: 126/78   Pulse: 68   Resp: 16   Temp: 36.6 °C (97.8 °F)   SpO2: 95%       Physical Exam  Vitals reviewed.   Constitutional:       Appearance: Normal appearance. She is normal weight.   Musculoskeletal:        Hands:       Comments: TTP over the volar aspect of the right hand.  There is mild bruising.  Full range of motion of the fingers and wrist.  Neurovascularly intact.  No gross deformity present.   Neurological:      Mental Status: She is alert.       X-ray images viewed and interpreted by APRN, confirmed by radiology:        RADIOLOGY RESULTS   DX-HAND 3+ RIGHT    Result Date: 6/17/2024 6/17/2024 11:48 AM HISTORY/REASON FOR EXAM:  Right hand pain after right hand injury TECHNIQUE/EXAM DESCRIPTION AND NUMBER OF VIEWS: 3 views of the RIGHT hand. COMPARISON:  No comparison available FINDINGS:  Bone mineralization is normal.  There is no evidence of fracture or dislocation.  Soft tissues are normal.     No evidence of fracture or dislocation.             Assessment/Plan:     Diagnosis and associated orders:     1. Soft tissue injury of right hand, initial encounter  - DX-HAND 3+ RIGHT; Future  - Wrist Brace Cock Up Volar         Comments/MDM:     X-ray negative for acute osseous abnormality   Based on negative xray findings, presentation consistent with soft tissue injury of the volar aspect of the right hand  Volar splint applied in clinic  Discussed supportive measures, including ice, oral analgesics, activity modification.  No evidence of neurovascular compromise.  Work note provided  Return to clinic as needed           All questions answered. Patient verbalized understanding and is in agreement with this plan of care.     If symptoms are worsening or not improving in 3-5 days, follow-up with PCP or return to . Differential diagnosis, natural history, and supportive care discussed. AVS handout given and reviewed with patient. Patient educated on red flags and when to seek treatment back in ED or  [facility].     I personally reviewed prior external notes and test results pertinent to today's visit.  I have independently reviewed and interpreted all diagnostics ordered during this urgent care visit.     This dictation has been created using voice recognition software. The accuracy of the dictation is limited by the abilities of the software. I expect there may be some errors of grammar and possibly content. I made every attempt to manually correct the errors within my dictation. However, errors related to voice recognition software may still exist and should be interpreted within the appropriate context.    This note was electronically signed by PILY Barrera

## 2024-06-17 NOTE — LETTER
June 17, 2024    To Whom It May Concern:         This is confirmation that Niki Riley attended her scheduled appointment with PILY Garcia on 6/17/24.  Please excuse her from work on 6/17/2024 and 6/19/2024 due to an acute injury.         If you have any questions please do not hesitate to call me at the phone number listed below.    Sincerely,          ZEHRA GarciaRDionneN.  [phone]

## 2024-06-27 ENCOUNTER — APPOINTMENT (OUTPATIENT)
Dept: MEDICAL GROUP | Facility: CLINIC | Age: 24
End: 2024-06-27
Payer: MEDICAID

## 2024-06-27 VITALS
RESPIRATION RATE: 18 BRPM | SYSTOLIC BLOOD PRESSURE: 118 MMHG | DIASTOLIC BLOOD PRESSURE: 68 MMHG | HEART RATE: 70 BPM | TEMPERATURE: 98.1 F | OXYGEN SATURATION: 100 % | HEIGHT: 66 IN | BODY MASS INDEX: 22.89 KG/M2 | WEIGHT: 142.42 LBS

## 2024-06-27 DIAGNOSIS — R10.9 ABDOMINAL CRAMPING: ICD-10-CM

## 2024-06-27 DIAGNOSIS — R10.2 PELVIC PAIN: ICD-10-CM

## 2024-06-27 PROCEDURE — 99213 OFFICE O/P EST LOW 20 MIN: CPT | Performed by: PHYSICIAN ASSISTANT

## 2024-06-27 PROCEDURE — 3078F DIAST BP <80 MM HG: CPT | Performed by: PHYSICIAN ASSISTANT

## 2024-06-27 PROCEDURE — 3074F SYST BP LT 130 MM HG: CPT | Performed by: PHYSICIAN ASSISTANT

## 2024-06-27 ASSESSMENT — FIBROSIS 4 INDEX: FIB4 SCORE: .5838051064926238767
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
## 2024-07-19 ENCOUNTER — OFFICE VISIT (OUTPATIENT)
Dept: URGENT CARE | Facility: PHYSICIAN GROUP | Age: 24
End: 2024-07-19
Payer: MEDICAID

## 2024-07-19 VITALS
WEIGHT: 141.6 LBS | BODY MASS INDEX: 22.76 KG/M2 | RESPIRATION RATE: 16 BRPM | HEART RATE: 94 BPM | TEMPERATURE: 98.8 F | OXYGEN SATURATION: 98 % | HEIGHT: 66 IN | DIASTOLIC BLOOD PRESSURE: 64 MMHG | SYSTOLIC BLOOD PRESSURE: 102 MMHG

## 2024-07-19 DIAGNOSIS — S05.8X1A ABRASION OF SCLERA OF RIGHT EYE, INITIAL ENCOUNTER: ICD-10-CM

## 2024-07-19 PROCEDURE — 3074F SYST BP LT 130 MM HG: CPT | Performed by: FAMILY MEDICINE

## 2024-07-19 PROCEDURE — 99213 OFFICE O/P EST LOW 20 MIN: CPT | Performed by: FAMILY MEDICINE

## 2024-07-19 PROCEDURE — 3078F DIAST BP <80 MM HG: CPT | Performed by: FAMILY MEDICINE

## 2024-07-19 RX ORDER — CIPROFLOXACIN HYDROCHLORIDE 3.5 MG/ML
SOLUTION/ DROPS TOPICAL
Qty: 5 ML | Refills: 1 | Status: SHIPPED | OUTPATIENT
Start: 2024-07-19

## 2024-07-19 RX ORDER — KETOROLAC TROMETHAMINE 5 MG/ML
SOLUTION OPHTHALMIC
Qty: 5 ML | Refills: 1 | Status: SHIPPED | OUTPATIENT
Start: 2024-07-19

## 2024-07-19 ASSESSMENT — FIBROSIS 4 INDEX: FIB4 SCORE: .5838051064926238767

## 2024-08-06 ENCOUNTER — OFFICE VISIT (OUTPATIENT)
Dept: URGENT CARE | Facility: PHYSICIAN GROUP | Age: 24
End: 2024-08-06
Payer: MEDICAID

## 2024-08-06 VITALS
SYSTOLIC BLOOD PRESSURE: 110 MMHG | DIASTOLIC BLOOD PRESSURE: 70 MMHG | HEIGHT: 66 IN | TEMPERATURE: 98.2 F | RESPIRATION RATE: 16 BRPM | HEART RATE: 98 BPM | BODY MASS INDEX: 22.53 KG/M2 | OXYGEN SATURATION: 97 % | WEIGHT: 140.2 LBS

## 2024-08-06 DIAGNOSIS — M79.18 PAIN OF SHOULDER MUSCLE: ICD-10-CM

## 2024-08-06 PROCEDURE — 99214 OFFICE O/P EST MOD 30 MIN: CPT | Performed by: PHYSICIAN ASSISTANT

## 2024-08-06 PROCEDURE — 3074F SYST BP LT 130 MM HG: CPT | Performed by: PHYSICIAN ASSISTANT

## 2024-08-06 PROCEDURE — 3078F DIAST BP <80 MM HG: CPT | Performed by: PHYSICIAN ASSISTANT

## 2024-08-06 RX ORDER — NAPROXEN 500 MG/1
500 TABLET ORAL 2 TIMES DAILY WITH MEALS
Qty: 30 TABLET | Refills: 0 | Status: SHIPPED | OUTPATIENT
Start: 2024-08-06

## 2024-08-06 RX ORDER — METHYLPREDNISOLONE 4 MG
4 TABLET, DOSE PACK ORAL DAILY
Qty: 21 TABLET | Refills: 0 | Status: SHIPPED | OUTPATIENT
Start: 2024-08-06

## 2024-08-06 ASSESSMENT — FIBROSIS 4 INDEX: FIB4 SCORE: .5838051064926238767

## 2024-08-06 ASSESSMENT — ENCOUNTER SYMPTOMS
WEAKNESS: 0
TINGLING: 0
MYALGIAS: 1

## 2024-08-06 NOTE — PROGRESS NOTES
"  Subjective:     Niki Riley  is a 24 y.o. female who presents for Shoulder Pain (Pt states she's had right shoulder/shoulder blade pain for the last two weeks. Pt states she's always had pain in her shoulder blade, she broke her collar bone when she was 12 and ever since she's always had problems with it.  More recently the pain and discomfort have gotten worse. Pt says it feels static-y, sometimes the skin over her shoulder blade hurts. General constant discomfort.)       She presents today for right-sided shoulder pain that has been ongoing for last 2 weeks.  Atraumatic cause to her recent flareup but Notes injury that occurred when she was 12 and has been dealing with right-sided shoulder muscle pain since that time.  She describes the pain as a pinching and \"static\" sensation over the right scapular musculature.  She denies any loss of range of motion, no weakness.  No right upper extremity radiculopathy.       Review of Systems   Musculoskeletal:  Positive for myalgias. Negative for joint pain.   Neurological:  Negative for tingling and weakness.      Allergies   Allergen Reactions    Sulfa Drugs     Honey Dew     Kiwi Extract Anaphylaxis     Clarified by dietary staff    Lime Food Allergy Hives     Rash     Other Misc Swelling     Hair dye      Past Medical History:   Diagnosis Date    Asthma     Migraine     Shoulder pain 8/8/2014    Urinary tract infection         Objective:   /70 (BP Location: Left arm, Patient Position: Sitting, BP Cuff Size: Adult)   Pulse 98   Temp 36.8 °C (98.2 °F) (Temporal)   Resp 16   Ht 1.676 m (5' 6\")   Wt 63.6 kg (140 lb 3.2 oz)   SpO2 97%   BMI 22.63 kg/m²   Physical Exam  Vitals and nursing note reviewed.   Constitutional:       General: She is not in acute distress.     Appearance: She is not ill-appearing or toxic-appearing.   HENT:      Head: Normocephalic.      Nose: No rhinorrhea.   Eyes:      General: No scleral icterus.     Conjunctiva/sclera: " Conjunctivae normal.   Pulmonary:      Effort: Pulmonary effort is normal. No respiratory distress.      Breath sounds: No stridor.   Musculoskeletal:        Arms:       Cervical back: Neck supple.      Comments: Area of perceived pain over the above marked region over the right shoulder girdle but no areas of point tenderness on exam.  Right shoulder range of motion within normal limits.  Strength testing of the right upper extremity is comparable bilaterally nonpainful.   Neurological:      Mental Status: She is alert and oriented to person, place, and time.   Psychiatric:         Mood and Affect: Mood normal.         Behavior: Behavior normal.         Thought Content: Thought content normal.         Judgment: Judgment normal.             Diagnostic testing: None    Assessment/Plan:     Encounter Diagnoses   Name Primary?    Pain of shoulder muscle           Plan for care for today's complaint includes trial Medrol Dosepak, Zanaflex, naproxen for acute flareup of chronic right sided shoulder musculature pain.  Discussed with the patient her symptoms could be related to muscle imbalance over the shoulder girdle or to trigger points in the area; did place referral to sports medicine for ongoing evaluation management of current symptoms.  Continue to monitor symptoms and return to urgent care or follow-up with primary care provider if symptoms remain ongoing.  Follow-up in the emergency department if symptoms become severe, ER precautions discussed in office today..  Prescription for Medrol Dosepak, Zanaflex, naproxen provided.    See AVS Instructions below for written guidance provided to patient on after-visit management and care in addition to our verbal discussion during the visit.    Please note that this dictation was created using voice recognition software. I have attempted to correct all errors, but there may be sound-alike, spelling, grammar and possibly content errors that I did not discover before  finalizing the note.    All Morley PA-C

## 2024-08-11 ENCOUNTER — APPOINTMENT (OUTPATIENT)
Dept: RADIOLOGY | Facility: MEDICAL CENTER | Age: 24
End: 2024-08-11
Attending: PHYSICIAN ASSISTANT
Payer: MEDICAID

## 2024-08-11 DIAGNOSIS — R10.2 PELVIC PAIN: ICD-10-CM

## 2024-08-11 DIAGNOSIS — R10.9 ABDOMINAL CRAMPING: ICD-10-CM

## 2024-08-11 PROCEDURE — 76830 TRANSVAGINAL US NON-OB: CPT

## 2024-08-29 ENCOUNTER — OFFICE VISIT (OUTPATIENT)
Dept: MEDICAL GROUP | Facility: CLINIC | Age: 24
End: 2024-08-29
Payer: MEDICAID

## 2024-08-29 VITALS
RESPIRATION RATE: 16 BRPM | SYSTOLIC BLOOD PRESSURE: 112 MMHG | DIASTOLIC BLOOD PRESSURE: 76 MMHG | HEART RATE: 83 BPM | TEMPERATURE: 98.5 F | BODY MASS INDEX: 24.09 KG/M2 | WEIGHT: 149.91 LBS | OXYGEN SATURATION: 98 % | HEIGHT: 66 IN

## 2024-08-29 DIAGNOSIS — G89.29 CHRONIC RIGHT SHOULDER PAIN: ICD-10-CM

## 2024-08-29 DIAGNOSIS — M25.511 CHRONIC RIGHT SHOULDER PAIN: ICD-10-CM

## 2024-08-29 DIAGNOSIS — N83.201 CYST OF RIGHT OVARY: ICD-10-CM

## 2024-08-29 DIAGNOSIS — R11.0 NAUSEA: ICD-10-CM

## 2024-08-29 DIAGNOSIS — R20.2 PARESTHESIA: ICD-10-CM

## 2024-08-29 RX ORDER — ONDANSETRON 4 MG/1
4 TABLET, ORALLY DISINTEGRATING ORAL EVERY 8 HOURS PRN
Qty: 20 TABLET | Refills: 5 | Status: SHIPPED | OUTPATIENT
Start: 2024-08-29

## 2024-08-29 RX ORDER — ATOMOXETINE 25 MG/1
25 CAPSULE ORAL
COMMUNITY
Start: 2024-08-06

## 2024-08-29 ASSESSMENT — FIBROSIS 4 INDEX: FIB4 SCORE: .5838051064926238767
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
## 2024-09-05 ENCOUNTER — APPOINTMENT (OUTPATIENT)
Dept: RADIOLOGY | Facility: IMAGING CENTER | Age: 24
End: 2024-09-05
Attending: PHYSICIAN ASSISTANT
Payer: MEDICAID

## 2024-09-05 ENCOUNTER — NON-PROVIDER VISIT (OUTPATIENT)
Dept: URGENT CARE | Facility: PHYSICIAN GROUP | Age: 24
End: 2024-09-05
Payer: MEDICAID

## 2024-09-05 DIAGNOSIS — R20.2 PARESTHESIA: ICD-10-CM

## 2024-09-05 DIAGNOSIS — G89.29 CHRONIC RIGHT SHOULDER PAIN: ICD-10-CM

## 2024-09-05 DIAGNOSIS — M25.511 CHRONIC RIGHT SHOULDER PAIN: ICD-10-CM

## 2024-09-05 PROCEDURE — 72040 X-RAY EXAM NECK SPINE 2-3 VW: CPT | Mod: TC,FY | Performed by: NURSE PRACTITIONER

## 2024-09-05 PROCEDURE — 73030 X-RAY EXAM OF SHOULDER: CPT | Mod: TC,FY,RT | Performed by: NURSE PRACTITIONER

## 2024-09-23 ENCOUNTER — OFFICE VISIT (OUTPATIENT)
Dept: URGENT CARE | Facility: PHYSICIAN GROUP | Age: 24
End: 2024-09-23
Payer: MEDICAID

## 2024-09-23 VITALS
RESPIRATION RATE: 16 BRPM | TEMPERATURE: 97.1 F | OXYGEN SATURATION: 97 % | WEIGHT: 140 LBS | HEART RATE: 88 BPM | BODY MASS INDEX: 22.5 KG/M2 | SYSTOLIC BLOOD PRESSURE: 90 MMHG | HEIGHT: 66 IN | DIASTOLIC BLOOD PRESSURE: 56 MMHG

## 2024-09-23 DIAGNOSIS — N92.0 MENORRHAGIA WITH REGULAR CYCLE: ICD-10-CM

## 2024-09-23 LAB
POCT INT CON NEG: NEGATIVE
POCT INT CON POS: POSITIVE
POCT URINE PREGNANCY TEST: NEGATIVE

## 2024-09-23 PROCEDURE — 99214 OFFICE O/P EST MOD 30 MIN: CPT | Performed by: FAMILY MEDICINE

## 2024-09-23 PROCEDURE — 3078F DIAST BP <80 MM HG: CPT | Performed by: FAMILY MEDICINE

## 2024-09-23 PROCEDURE — 81025 URINE PREGNANCY TEST: CPT | Performed by: FAMILY MEDICINE

## 2024-09-23 PROCEDURE — 3074F SYST BP LT 130 MM HG: CPT | Performed by: FAMILY MEDICINE

## 2024-09-23 ASSESSMENT — FIBROSIS 4 INDEX: FIB4 SCORE: .5838051064926238767

## 2024-09-24 ENCOUNTER — HOSPITAL ENCOUNTER (OUTPATIENT)
Dept: RADIOLOGY | Facility: MEDICAL CENTER | Age: 24
End: 2024-09-24
Attending: FAMILY MEDICINE
Payer: MEDICAID

## 2024-09-24 DIAGNOSIS — N92.0 MENORRHAGIA WITH REGULAR CYCLE: ICD-10-CM

## 2024-09-24 PROCEDURE — 76830 TRANSVAGINAL US NON-OB: CPT

## 2024-09-24 NOTE — PROGRESS NOTES
Subjective:      Chief Complaint   Patient presents with    Vaginal Bleeding     Passing large clots, abd pain, back pain. Worried about miscarriage.                 HPI     Pt comes in c/o     vaginal bleeding for 1 d.    Prior to today, LMP 9/22, so she is about one week late.   She is using several tampons   per day.        + clots.       Currently trying to get pregnant.           +  pelvic     pain.         Social History     Tobacco Use    Smoking status: Never    Smokeless tobacco: Never    Tobacco comments:     vapes   Vaping Use    Vaping status: Every Day    Substances: Flavoring    Devices: [employer]   Substance Use Topics    Alcohol use: Yes     Comment: occ    Drug use: No         Current Outpatient Medications on File Prior to Visit   Medication Sig Dispense Refill    atomoxetine (STRATTERA) 25 MG capsule Take 25 mg by mouth at bedtime.      ondansetron (ZOFRAN ODT) 4 MG TABLET DISPERSIBLE Take 1 Tablet by mouth every 8 hours as needed for Nausea/Vomiting. 20 Tablet 5    tizanidine (ZANAFLEX) 4 MG Tab Take 1 Tablet by mouth every 6 hours as needed (Muscle Spasm). 15 Tablet 0    loratadine (CLARITIN) 10 MG Tab Take 1 Tablet by mouth every day. 90 Tablet 2    albuterol 108 (90 Base) MCG/ACT Aero Soln inhalation aerosol Inhale 2 Puffs every 6 hours as needed for Shortness of Breath. 18 g 2    Blood Glucose Monitoring Suppl (ONETOUCH VERIO REFLECT) w/Device Kit USE AS DIRECTED      ONETOUCH VERIO strip USE 1 STRIP TO CHECK GLUCOSE 4 TIMES DAILY      Blood Glucose Test Strips Use one One Touch Verio strip to test blood sugar four times daily. 400 Strip 2    Blood Glucose Meter Kit Test blood sugar as recommended by provider. One Touch Verio blood glucose monitoring kit. 1 Kit 0    Alcohol Swabs Wipe site with prep pad prior to injection. 200 Each 2    methylPREDNISolone (MEDROL DOSEPAK) 4 MG Tablet Therapy Pack Take 1 Tablet by mouth every day. Follow schedule on package instructions. (Patient not  "taking: Reported on 8/29/2024) 21 Tablet 0    naproxen (NAPROSYN) 500 MG Tab Take 1 Tablet by mouth 2 times a day with meals. (Patient not taking: Reported on 8/29/2024) 30 Tablet 0    ciprofloxacin (CILOXIN) 0.3 % Solution 1-2 DROPS TO RIGHT EYE EVERY 2-4 HOURS WHILE AWAKE X 7 DAYS. (Patient not taking: Reported on 8/29/2024) 5 mL 1    ketorolac (ACULAR) 0.5 % Solution 1 DROP TO RIGHT EYE EVERY 6 HOURS ONLY IF NEEDED FOR PAIN AND INFLAMMATION. (Patient not taking: Reported on 8/29/2024) 5 mL 1     No current facility-administered medications on file prior to visit.       Past Medical History:   Diagnosis Date    Asthma     Migraine     Shoulder pain 8/8/2014    Urinary tract infection                Review of Systems:  Review of Systems   Constitutional: Negative for fever.  Negative for fatigue  HENT: no neck pain, headache or dizziness  Eyes: denies vision changes  Respiratory: no cough, congestion, SOB  Cardiovascular: denies palpations, chest pain   Gastrointestinal: denies diarrhea, abdominal pain or constipation.  No blood in stool.   - denies dysuria or urgency.  Musculoskeletal: denies back pain or joint pain    Skin: no itching or rash  Neurological: No numbness or tingling.          Objective:     BP 90/56   Pulse 88   Temp 36.2 °C (97.1 °F) (Temporal)   Resp 16   Ht 1.676 m (5' 6\")   Wt 63.5 kg (140 lb)   SpO2 97%       Physical Exam   Constitutional: She is oriented to person, place, and time. She appears well-developed and well-nourished. No distress.   HENT:   Head: Normocephalic and atraumatic.   Eyes: Conjunctivae are normal.   Cardiovascular: Normal rate, regular rhythm and normal heart sounds.    No murmur heard.  Pulmonary/Chest: Effort normal and breath sounds normal. No respiratory distress.   Abdominal: Soft. Bowel sounds are normal. She exhibits no distension. There is no tenderness.   Genitourinary: deferred   Neurological: She is alert and oriented to person, place, and time. No " cranial nerve deficit.   Skin: Skin is warm. She is not diaphoretic. No erythema.   Psychiatric: Her behavior is normal.   Nursing note and vitals reviewed.              Assessment/Plan:      Menorrhagia with regular cycle  Urine Hcg negative  Check Hcg quant, CBC    D  Scheduled for u/s in am    Hemodynamically stable        - POCT Pregnancy  - US-PELVIC COMPLETE (TRANSABDOMINAL/TRANSVAGINAL) (COMBO); Future     - Referral to Gynecology

## 2024-10-17 ENCOUNTER — OFFICE VISIT (OUTPATIENT)
Dept: MEDICAL GROUP | Facility: CLINIC | Age: 24
End: 2024-10-17
Payer: MEDICAID

## 2024-10-17 VITALS
OXYGEN SATURATION: 99 % | SYSTOLIC BLOOD PRESSURE: 122 MMHG | HEART RATE: 93 BPM | HEIGHT: 66 IN | TEMPERATURE: 98 F | RESPIRATION RATE: 20 BRPM | WEIGHT: 149.03 LBS | DIASTOLIC BLOOD PRESSURE: 70 MMHG | BODY MASS INDEX: 23.95 KG/M2

## 2024-10-17 DIAGNOSIS — E16.2 HYPOGLYCEMIA: ICD-10-CM

## 2024-10-17 DIAGNOSIS — R10.13 DYSPEPSIA: ICD-10-CM

## 2024-10-17 DIAGNOSIS — M25.511 CHRONIC RIGHT SHOULDER PAIN: ICD-10-CM

## 2024-10-17 DIAGNOSIS — G89.29 CHRONIC RIGHT SHOULDER PAIN: ICD-10-CM

## 2024-10-17 DIAGNOSIS — M89.8X1 PAIN OF RIGHT SCAPULA: ICD-10-CM

## 2024-10-17 DIAGNOSIS — R11.0 NAUSEA: ICD-10-CM

## 2024-10-17 DIAGNOSIS — R10.11 RUQ PAIN: ICD-10-CM

## 2024-10-17 DIAGNOSIS — M79.18 MYALGIA OF MUSCLE OF NECK: ICD-10-CM

## 2024-10-17 PROCEDURE — 3074F SYST BP LT 130 MM HG: CPT | Performed by: PHYSICIAN ASSISTANT

## 2024-10-17 PROCEDURE — 3078F DIAST BP <80 MM HG: CPT | Performed by: PHYSICIAN ASSISTANT

## 2024-10-17 PROCEDURE — 99214 OFFICE O/P EST MOD 30 MIN: CPT | Performed by: PHYSICIAN ASSISTANT

## 2024-10-17 RX ORDER — LANCETS 30 GAUGE
EACH MISCELLANEOUS
Qty: 100 EACH | Refills: 3 | Status: SHIPPED | OUTPATIENT
Start: 2024-10-17

## 2024-10-17 RX ORDER — MELOXICAM 7.5 MG/1
7.5 TABLET ORAL DAILY
Qty: 90 TABLET | Refills: 0 | Status: SHIPPED | OUTPATIENT
Start: 2024-10-17

## 2024-10-17 RX ORDER — ONDANSETRON 4 MG/1
4 TABLET, ORALLY DISINTEGRATING ORAL EVERY 8 HOURS PRN
Qty: 20 TABLET | Refills: 5 | Status: SHIPPED | OUTPATIENT
Start: 2024-10-17

## 2024-10-17 RX ORDER — CYCLOBENZAPRINE HCL 10 MG
10 TABLET ORAL 2 TIMES DAILY PRN
Qty: 180 TABLET | Refills: 0 | Status: SHIPPED | OUTPATIENT
Start: 2024-10-17

## 2024-10-17 RX ORDER — BLOOD SUGAR DIAGNOSTIC
1 STRIP MISCELLANEOUS PRN
Qty: 100 STRIP | Refills: 3 | Status: SHIPPED | OUTPATIENT
Start: 2024-10-17

## 2024-10-17 RX ORDER — GLUCOSAMINE HCL/CHONDROITIN SU 500-400 MG
CAPSULE ORAL
Qty: 100 EACH | Refills: 3 | Status: SHIPPED | OUTPATIENT
Start: 2024-10-17

## 2024-10-17 ASSESSMENT — FIBROSIS 4 INDEX: FIB4 SCORE: .5838051064926238767

## 2024-11-07 ENCOUNTER — APPOINTMENT (OUTPATIENT)
Dept: GYNECOLOGY | Facility: CLINIC | Age: 24
End: 2024-11-07
Attending: PHYSICIAN ASSISTANT
Payer: MEDICAID

## 2024-11-13 NOTE — PROGRESS NOTES
"Gynecology Visit        CC: pelvic pain, discuss fertility      HPI  Niki Riley is a 24 y.o. female  presenting today for the above.  Patient reports pelvic pain, intermittent, sharp, radiating to her back. Pain not necessarily a/w periods. Has had symptoms since IUD removal in 2024.  As far as her periods reports regular, monthly, normal flow. Occasionally painful intercourse. No painful BM. No blood on her stools.  In regards to her menstrual history patient had menarche at 12. At 14yo started OCP, got pregnant at 16y. Uncomplicated . After that started Depo, had a \"few\" miscarriages. Then had Mirena IUD placed at 17-18y and had it for 7y until this year.  This is a new partner, he has no kids.  Patient has hx of syphilis. No hx chlamydia or gonorrhea. Of note patient had diagnostic laparoscopy in  for ruptured ovarian cyst, endometriosis was not noted in the findings.    Frequency of IC: Twice monthly around ovulation following with vishnu  Social history: non nicotine vape, occasionally with CBD  Medications: loratadine, asthma inhaler  Partner: never had kids, smokes cigars, vapes, MJ    Imaging  Pelvic US 2024:   1.  Normal transvaginal appearance of the pelvis.  2.  Right ovarian cyst is no longer identified consistent with resolution of physiologic cyst.    Menstrual History  Patient's last menstrual period was 10/19/2024 (exact date).      Gynecologic History  Last pap:  NILM  Hx abnormal pap smears: no  Hx of PID/STDs: syphilis  Contraception plan: none, IUD removed earlier this year      OB History    Para Term  AB Living   1 1 1     1   SAB IAB Ectopic Molar Multiple Live Births             1      # Outcome Date GA Lbr [place]/2nd Weight Sex Type Anes PTL Lv   1 Term 16 39w4d  7 lb 5.1 oz M Vag-Spont   NBA       Past Medical History  Past Medical History:   Diagnosis Date    Asthma     Migraine     Shoulder pain 2014    Urinary tract infection  "       Past Surgical History  Past Surgical History:   Procedure Laterality Date    PELVISCOPY  9/5/2018    Procedure: PELVISCOPY-  OVARIAN CYSTECTOMY;  Surgeon: Kathleen Chen M.D.;  Location: SURGERY SAME DAY [facility];  Service: Gynecology    GASTROSCOPY  5/24/2014    Performed by Luis Felipe Decker M.D. at SURGERY [facility]       Social History  Social History     Tobacco Use    Smoking status: Never    Smokeless tobacco: Never    Tobacco comments:     vapes   Vaping Use    Vaping status: Every Day    Substances: Flavoring    Devices: Refillable tank   Substance Use Topics    Alcohol use: Yes     Comment: bi-weekly    Drug use: No        Family History  Family History   Problem Relation Age of Onset    Anxiety disorder Mother     Heart Disease Maternal Grandfather        Home Medications  Current Outpatient Medications   Medication Sig    Prenatal Vit-Fe Fumarate-FA (PRENATAL/FOLIC ACID PO) Take  by mouth every day. 2 gummies a day    cyclobenzaprine (FLEXERIL) 10 mg Tab Take 1 Tablet by mouth 2 times a day as needed for Muscle Spasms (muscle spasm and pain).    ondansetron (ZOFRAN ODT) 4 MG TABLET DISPERSIBLE Take 1 Tablet by mouth every 8 hours as needed for Nausea/Vomiting.    ONETOUCH VERIO strip 1 Strip by Other route as needed (dizziness).    Lancets Use one One Touch Verio Flex lancet to test blood sugar once daily .    Alcohol Swabs Wipe site with prep pad prior to injection.    omeprazole (PRILOSEC) 20 MG delayed-release capsule Take 1 Capsule by mouth every day.    meloxicam (MOBIC) 7.5 MG Tab Take 1 Tablet by mouth every day.    atomoxetine (STRATTERA) 25 MG capsule Take 25 mg by mouth at bedtime. (Patient not taking: Reported on 10/17/2024)    methylPREDNISolone (MEDROL DOSEPAK) 4 MG Tablet Therapy Pack Take 1 Tablet by mouth every day. Follow schedule on package instructions. (Patient not taking: Reported on 8/29/2024)    naproxen (NAPROSYN) 500 MG Tab Take 1 Tablet by mouth 2 times a day  with meals. (Patient not taking: Reported on 8/29/2024)    ciprofloxacin (CILOXIN) 0.3 % Solution 1-2 DROPS TO RIGHT EYE EVERY 2-4 HOURS WHILE AWAKE X 7 DAYS. (Patient not taking: Reported on 8/29/2024)    ketorolac (ACULAR) 0.5 % Solution 1 DROP TO RIGHT EYE EVERY 6 HOURS ONLY IF NEEDED FOR PAIN AND INFLAMMATION. (Patient not taking: Reported on 8/29/2024)    loratadine (CLARITIN) 10 MG Tab Take 1 Tablet by mouth every day.    albuterol 108 (90 Base) MCG/ACT Aero Soln inhalation aerosol Inhale 2 Puffs every 6 hours as needed for Shortness of Breath.    Blood Glucose Monitoring Suppl (ONETOUCH VERIO REFLECT) w/Device Kit USE AS DIRECTED    Blood Glucose Test Strips Use one One Touch Verio strip to test blood sugar four times daily.    Blood Glucose Meter Kit Test blood sugar as recommended by provider. One Touch Verio blood glucose monitoring kit.    Alcohol Swabs Wipe site with prep pad prior to injection.       Allergies/Reactions  Allergies   Allergen Reactions    Sulfa Drugs     Honey Dew     Kiwi Extract Anaphylaxis     Clarified by dietary staff    Lime Food Allergy Hives     Rash     Other Misc Swelling     Hair dye           ROS: I have reviewed all systems with patient. Pertinent positive and negative findings are listed below except for what is otherwise stated in the History of Present Illness.       Objective:    Labs  Lab Results   Component Value Date/Time    WBC 8.8 12/30/2020 09:08 PM    WBC 4.8 01/15/2019 09:47 AM    RBC 4.48 02/25/2024 02:03 PM    RBC 4.73 01/15/2019 09:47 AM    HEMOGLOBIN 13.0 02/25/2024 02:03 PM    HEMOGLOBIN 13.7 01/15/2019 09:47 AM    HEMATOCRIT 37.8 02/25/2024 02:03 PM    HEMATOCRIT 41.3 01/15/2019 09:47 AM    MCV 84.3 02/25/2024 02:03 PM    MCV 87.3 01/15/2019 09:47 AM    MCH 29.1 02/25/2024 02:03 PM    MCH 29.0 01/15/2019 09:47 AM    MCHC 34.5 02/25/2024 02:03 PM    MCHC 33.2 (L) 01/15/2019 09:47 AM    RDW 12.5 02/25/2024 02:03 PM    RDW 39.5 01/15/2019 09:47 AM     PLATELETCT 169 02/25/2024 02:03 PM    PLATELETCT 261 01/15/2019 09:47 AM    MPV 8.1 02/25/2024 02:03 PM    MPV 10.2 01/15/2019 09:47 AM    NEUTSPOLYS 26.5 (L) 02/25/2024 02:03 PM    NEUTSPOLYS 45.50 01/15/2019 09:47 AM    LYMPHOCYTES 43.8 02/25/2024 02:03 PM    LYMPHOCYTES 42.00 (H) 01/15/2019 09:47 AM    MONOCYTES 27.1 (H) 02/25/2024 02:03 PM    MONOCYTES 9.20 01/15/2019 09:47 AM    EOSINOPHILS 2.0 02/25/2024 02:03 PM    EOSINOPHILS 2.30 01/15/2019 09:47 AM    BASOPHILS 0.6 02/25/2024 02:03 PM    BASOPHILS 0.80 01/15/2019 09:47 AM    IMMGRAN 0.20 01/15/2019 09:47 AM    NRBC 0.00 01/15/2019 09:47 AM    NEUTS 1.10 (L) 02/25/2024 02:03 PM    NEUTS 2.18 01/15/2019 09:47 AM    LYMPHS 1.80 02/25/2024 02:03 PM    LYMPHS 2.01 01/15/2019 09:47 AM    MONOS 1.10 (H) 02/25/2024 02:03 PM    MONOS 0.44 01/15/2019 09:47 AM    EOS 0.10 02/25/2024 02:03 PM    EOS 0.11 01/15/2019 09:47 AM    BASO 0.00 02/25/2024 02:03 PM    BASO 0.04 01/15/2019 09:47 AM    IMMGRANAB 0.01 01/15/2019 09:47 AM    NRBCAB 0.00 01/15/2019 09:47 AM       Lab Results   Component Value Date/Time    HBA1C 4.9 12/29/2023 09:01 AM         Physical Exam  /80 (BP Location: Right arm, Patient Position: Sitting, BP Cuff Size: Adult)   Pulse 92   Wt 145 lb   LMP 10/19/2024 (Exact Date)   BMI 23.40 kg/m²    Patient's last menstrual period was 10/19/2024 (exact date).  Body mass index is 23.4 kg/m².    General: alert, well appearing, and in no distress  Neurological: alert, oriented, normal speech, no focal findings or movement disorder noted  Respiratory: no tachypnea, retractions or cyanosis  Abdominal: soft, nontender, nondistended, no masses or organomegaly    Pelvic exam:   external genitalia: normal appearance  urinary system: urethral meatus normal  vaginal: normal mucosa without prolapse or lesions  pelvic floor: nontender   cervix: normal appearance  adnexa: normal on bimanual exam  uterus: normal size, normal contour , mobile, non-tender        Female chaperone present.         Assessment:  24-year-old   Trying to conceive for 7 months  Pelvic pain  Normal ultrasound  Laparoscopy in 2018 negative for endometriosis     Plan:  1. Encounter for preconception consultation  Reviewed with patient recommendation to proceed with infertility workup if she is unable to conceive after 12 months trying.  Reviewed that infertility is partially related to female factors, partially related to male factors and partially idiopathic.  Discussed importance of healthy lifestyle, avoiding nicotine products.  Discussed evidence of marijuana decreasing sperm mobility.  Reviewed importance of daily intake of prenatal vitamins with folic acid.  Reviewed importance of regular intercourse around ovulation time.  If unable to conceive after 12 months of trying she can come back to see me for initial infertility workup or she can go straight to an infertility specialist.  In regards to her pelvic pain I have a low suspicion for endometriosis currently. However if persistent pain and/ or inability to conceive with normal workup would consider a diagnostic laparoscopy.    2. Screen for STD (sexually transmitted disease)  - Chlamydia/GC, PCR (Genital/Anal swab); Future         Cahterine Miller MD

## 2024-11-14 ENCOUNTER — GYNECOLOGY VISIT (OUTPATIENT)
Dept: GYNECOLOGY | Facility: CLINIC | Age: 24
End: 2024-11-14
Attending: FAMILY MEDICINE
Payer: MEDICAID

## 2024-11-14 ENCOUNTER — HOSPITAL ENCOUNTER (OUTPATIENT)
Facility: MEDICAL CENTER | Age: 24
End: 2024-11-14
Attending: STUDENT IN AN ORGANIZED HEALTH CARE EDUCATION/TRAINING PROGRAM
Payer: MEDICAID

## 2024-11-14 VITALS
SYSTOLIC BLOOD PRESSURE: 116 MMHG | BODY MASS INDEX: 23.4 KG/M2 | WEIGHT: 145 LBS | DIASTOLIC BLOOD PRESSURE: 80 MMHG | HEART RATE: 92 BPM

## 2024-11-14 DIAGNOSIS — Z11.3 SCREEN FOR STD (SEXUALLY TRANSMITTED DISEASE): ICD-10-CM

## 2024-11-14 DIAGNOSIS — Z31.69 ENCOUNTER FOR PRECONCEPTION CONSULTATION: ICD-10-CM

## 2024-11-14 PROCEDURE — 3074F SYST BP LT 130 MM HG: CPT | Performed by: STUDENT IN AN ORGANIZED HEALTH CARE EDUCATION/TRAINING PROGRAM

## 2024-11-14 PROCEDURE — 3079F DIAST BP 80-89 MM HG: CPT | Performed by: STUDENT IN AN ORGANIZED HEALTH CARE EDUCATION/TRAINING PROGRAM

## 2024-11-14 PROCEDURE — 87591 N.GONORRHOEAE DNA AMP PROB: CPT

## 2024-11-14 PROCEDURE — 99214 OFFICE O/P EST MOD 30 MIN: CPT | Performed by: STUDENT IN AN ORGANIZED HEALTH CARE EDUCATION/TRAINING PROGRAM

## 2024-11-14 PROCEDURE — 87491 CHLMYD TRACH DNA AMP PROBE: CPT

## 2024-11-14 ASSESSMENT — FIBROSIS 4 INDEX: FIB4 SCORE: .5838051064926238767
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
## 2024-11-15 LAB
C TRACH DNA GENITAL QL NAA+PROBE: NEGATIVE
N GONORRHOEA DNA GENITAL QL NAA+PROBE: NEGATIVE
SPECIMEN SOURCE: NORMAL

## 2024-11-16 ENCOUNTER — HOSPITAL ENCOUNTER (OUTPATIENT)
Dept: RADIOLOGY | Facility: MEDICAL CENTER | Age: 24
End: 2024-11-16
Attending: PHYSICIAN ASSISTANT
Payer: MEDICAID

## 2024-11-16 DIAGNOSIS — R10.11 RUQ PAIN: ICD-10-CM

## 2024-11-16 PROCEDURE — 76705 ECHO EXAM OF ABDOMEN: CPT

## 2024-11-22 ENCOUNTER — HOSPITAL ENCOUNTER (OUTPATIENT)
Dept: RADIOLOGY | Facility: MEDICAL CENTER | Age: 24
End: 2024-11-22
Attending: PHYSICIAN ASSISTANT
Payer: MEDICAID

## 2024-11-22 DIAGNOSIS — G89.29 CHRONIC RIGHT SHOULDER PAIN: ICD-10-CM

## 2024-11-22 DIAGNOSIS — M25.511 CHRONIC RIGHT SHOULDER PAIN: ICD-10-CM

## 2024-11-22 DIAGNOSIS — M79.18 MYALGIA OF MUSCLE OF NECK: ICD-10-CM

## 2024-11-22 DIAGNOSIS — M89.8X1 PAIN OF RIGHT SCAPULA: ICD-10-CM

## 2024-11-22 PROCEDURE — 72141 MRI NECK SPINE W/O DYE: CPT

## 2024-12-12 ENCOUNTER — OFFICE VISIT (OUTPATIENT)
Dept: URGENT CARE | Facility: PHYSICIAN GROUP | Age: 24
End: 2024-12-12
Payer: MEDICAID

## 2024-12-12 VITALS
SYSTOLIC BLOOD PRESSURE: 110 MMHG | WEIGHT: 150 LBS | TEMPERATURE: 97.5 F | OXYGEN SATURATION: 97 % | RESPIRATION RATE: 14 BRPM | HEART RATE: 110 BPM | BODY MASS INDEX: 24.21 KG/M2 | DIASTOLIC BLOOD PRESSURE: 74 MMHG

## 2024-12-12 DIAGNOSIS — Z32.01 PREGNANCY TEST POSITIVE: ICD-10-CM

## 2024-12-12 DIAGNOSIS — N92.6 MISSED MENSES: ICD-10-CM

## 2024-12-12 LAB
POCT INT CON NEG: NEGATIVE
POCT INT CON POS: POSITIVE
POCT URINE PREGNANCY TEST: POSITIVE

## 2024-12-12 PROCEDURE — 99213 OFFICE O/P EST LOW 20 MIN: CPT | Performed by: NURSE PRACTITIONER

## 2024-12-12 PROCEDURE — 81025 URINE PREGNANCY TEST: CPT | Performed by: NURSE PRACTITIONER

## 2024-12-12 ASSESSMENT — FIBROSIS 4 INDEX: FIB4 SCORE: .5838051064926238767

## 2024-12-13 NOTE — PROGRESS NOTES
Subjective:     Niki Riley is a 24 y.o. female who presents for Pregnancy Test      HPI  Pt presents for evaluation of a new problem.  Patient presents to urgent care today for a pregnancy test.  She states that she did have 3 positive urine pregnancy test at home.  She has been attempting to get pregnant for the past 10 months.    ROS    PMH:   Past Medical History:   Diagnosis Date    Asthma     Migraine     Shoulder pain 8/8/2014    Urinary tract infection      ALLERGIES:   Allergies   Allergen Reactions    Sulfa Drugs     Honey Dew     Kiwi Extract Anaphylaxis     Clarified by dietary staff    Lime Food Allergy Hives     Rash     Other Misc Swelling     Hair dye      SURGHX:   Past Surgical History:   Procedure Laterality Date    PELVISCOPY  9/5/2018    Procedure: PELVISCOPY-  OVARIAN CYSTECTOMY;  Surgeon: Kathleen Chen M.D.;  Location: SURGERY SAME DAY [facility];  Service: Gynecology    GASTROSCOPY  5/24/2014    Performed by Luis Felipe Decker M.D. at SURGERY [facility]     SOCHX:   Social History     Socioeconomic History    Marital status: Other   Tobacco Use    Smoking status: Never    Smokeless tobacco: Never    Tobacco comments:     vapes   Vaping Use    Vaping status: Every Day    Substances: Flavoring    Devices: Refillable tank   Substance and Sexual Activity    Alcohol use: Yes     Comment: bi-weekly    Drug use: No    Sexual activity: Yes     Partners: Male     Birth control/protection: None     Social Drivers of Health     Intimate Partner Violence: Low Risk  (2/25/2024)    [facility]    History of Abuse     Have you ever been afraid of, threatened, neglected, or abused by someone?: No     FH:   Family History   Problem Relation Age of Onset    Anxiety disorder Mother     Heart Disease Maternal Grandfather          Objective:   /74   Pulse (!) 110   Temp 36.4 °C (97.5 °F) (Temporal)    Resp 14   Wt 68 kg (150 lb)   LMP 10/19/2024 (Exact Date)   SpO2 97%   BMI 24.21 kg/m²     Physical Exam  Vitals and nursing note reviewed.   Constitutional:       General: She is not in acute distress.     Appearance: Normal appearance. She is normal weight. She is not ill-appearing or toxic-appearing.   HENT:      Head: Normocephalic.      Right Ear: External ear normal.      Left Ear: External ear normal.      Nose: No congestion or rhinorrhea.      Mouth/Throat:      Pharynx: No oropharyngeal exudate or posterior oropharyngeal erythema.   Eyes:      General:         Right eye: No discharge.         Left eye: No discharge.      Pupils: Pupils are equal, round, and reactive to light.   Pulmonary:      Effort: Pulmonary effort is normal.   Abdominal:      General: Abdomen is flat.   Musculoskeletal:         General: Normal range of motion.      Cervical back: Normal range of motion and neck supple.   Skin:     General: Skin is dry.   Neurological:      General: No focal deficit present.      Mental Status: She is alert and oriented to person, place, and time. Mental status is at baseline.   Psychiatric:         Mood and Affect: Mood normal.         Behavior: Behavior normal.         Thought Content: Thought content normal.         Judgment: Judgment normal.       Results for orders placed or performed in visit on 12/12/24   POCT Pregnancy    Collection Time: 12/12/24  4:14 PM   Result Value Ref Range    POC Urine Pregnancy Test Positive     Internal Control Positive Positive     Internal Control Negative Negative      *Note: Due to a large number of results and/or encounters for the requested time period, some results have not been displayed. A complete set of results can be found in Results Review.       Assessment/Plan:   Assessment      1. Pregnancy test positive  POCT Pregnancy      2. Missed menses          Pregnancy confirmed in clinic today.  She was encouraged to start prenatal vitamins and increase  fluid consumption.  Patient to follow-up with OB/GYN.

## 2024-12-16 ENCOUNTER — OFFICE VISIT (OUTPATIENT)
Dept: URGENT CARE | Facility: PHYSICIAN GROUP | Age: 24
End: 2024-12-16
Payer: MEDICAID

## 2024-12-16 ENCOUNTER — HOSPITAL ENCOUNTER (EMERGENCY)
Facility: MEDICAL CENTER | Age: 24
End: 2024-12-16
Attending: EMERGENCY MEDICINE
Payer: MEDICAID

## 2024-12-16 ENCOUNTER — APPOINTMENT (OUTPATIENT)
Dept: RADIOLOGY | Facility: MEDICAL CENTER | Age: 24
End: 2024-12-16
Attending: EMERGENCY MEDICINE
Payer: MEDICAID

## 2024-12-16 VITALS
TEMPERATURE: 98.4 F | HEIGHT: 66 IN | DIASTOLIC BLOOD PRESSURE: 74 MMHG | WEIGHT: 151.2 LBS | SYSTOLIC BLOOD PRESSURE: 104 MMHG | BODY MASS INDEX: 24.3 KG/M2 | RESPIRATION RATE: 14 BRPM | OXYGEN SATURATION: 99 % | HEART RATE: 94 BPM

## 2024-12-16 VITALS
DIASTOLIC BLOOD PRESSURE: 60 MMHG | SYSTOLIC BLOOD PRESSURE: 125 MMHG | RESPIRATION RATE: 16 BRPM | HEART RATE: 80 BPM | OXYGEN SATURATION: 98 % | BODY MASS INDEX: 24.23 KG/M2 | TEMPERATURE: 97 F | HEIGHT: 66 IN | WEIGHT: 150.79 LBS

## 2024-12-16 DIAGNOSIS — N39.0 ACUTE UTI: ICD-10-CM

## 2024-12-16 DIAGNOSIS — Z3A.01 LESS THAN 8 WEEKS GESTATION OF PREGNANCY: ICD-10-CM

## 2024-12-16 DIAGNOSIS — O03.9 MISCARRIAGE: ICD-10-CM

## 2024-12-16 DIAGNOSIS — R10.9 ABDOMINAL CRAMPING: ICD-10-CM

## 2024-12-16 DIAGNOSIS — N93.9 VAGINA BLEEDING: ICD-10-CM

## 2024-12-16 LAB
ALBUMIN SERPL BCP-MCNC: 4.3 G/DL (ref 3.2–4.9)
ALBUMIN/GLOB SERPL: 1.3 G/DL
ALP SERPL-CCNC: 49 U/L (ref 30–99)
ALT SERPL-CCNC: 12 U/L (ref 2–50)
ANION GAP SERPL CALC-SCNC: 9 MMOL/L (ref 7–16)
APPEARANCE UR: CLEAR
AST SERPL-CCNC: 19 U/L (ref 12–45)
B-HCG SERPL-ACNC: 4.3 MIU/ML (ref 0–5)
BACTERIA #/AREA URNS HPF: ABNORMAL /HPF
BASOPHILS # BLD AUTO: 0.4 % (ref 0–1.8)
BASOPHILS # BLD: 0.05 K/UL (ref 0–0.12)
BILIRUB SERPL-MCNC: 0.3 MG/DL (ref 0.1–1.5)
BILIRUB UR QL STRIP.AUTO: NEGATIVE
BUN SERPL-MCNC: 8 MG/DL (ref 8–22)
CALCIUM ALBUM COR SERPL-MCNC: 9.4 MG/DL (ref 8.5–10.5)
CALCIUM SERPL-MCNC: 9.6 MG/DL (ref 8.5–10.5)
CASTS URNS QL MICRO: ABNORMAL /LPF (ref 0–2)
CHLORIDE SERPL-SCNC: 106 MMOL/L (ref 96–112)
CO2 SERPL-SCNC: 22 MMOL/L (ref 20–33)
COLOR UR: ABNORMAL
CREAT SERPL-MCNC: 0.77 MG/DL (ref 0.5–1.4)
EOSINOPHIL # BLD AUTO: 0.25 K/UL (ref 0–0.51)
EOSINOPHIL NFR BLD: 2.2 % (ref 0–6.9)
EPITHELIAL CELLS 1715: ABNORMAL /HPF (ref 0–5)
ERYTHROCYTE [DISTWIDTH] IN BLOOD BY AUTOMATED COUNT: 38.4 FL (ref 35.9–50)
GFR SERPLBLD CREATININE-BSD FMLA CKD-EPI: 110 ML/MIN/1.73 M 2
GLOBULIN SER CALC-MCNC: 3.2 G/DL (ref 1.9–3.5)
GLUCOSE SERPL-MCNC: 108 MG/DL (ref 65–99)
GLUCOSE UR STRIP.AUTO-MCNC: NEGATIVE MG/DL
HCT VFR BLD AUTO: 37.1 % (ref 37–47)
HGB BLD-MCNC: 12.4 G/DL (ref 12–16)
IMM GRANULOCYTES # BLD AUTO: 0.04 K/UL (ref 0–0.11)
IMM GRANULOCYTES NFR BLD AUTO: 0.4 % (ref 0–0.9)
KETONES UR STRIP.AUTO-MCNC: NEGATIVE MG/DL
LEUKOCYTE ESTERASE UR QL STRIP.AUTO: ABNORMAL
LYMPHOCYTES # BLD AUTO: 2.57 K/UL (ref 1–4.8)
LYMPHOCYTES NFR BLD: 22.8 % (ref 22–41)
MCH RBC QN AUTO: 29.2 PG (ref 27–33)
MCHC RBC AUTO-ENTMCNC: 33.4 G/DL (ref 32.2–35.5)
MCV RBC AUTO: 87.5 FL (ref 81.4–97.8)
MICRO URNS: ABNORMAL
MONOCYTES # BLD AUTO: 0.89 K/UL (ref 0–0.85)
MONOCYTES NFR BLD AUTO: 7.9 % (ref 0–13.4)
NEUTROPHILS # BLD AUTO: 7.47 K/UL (ref 1.82–7.42)
NEUTROPHILS NFR BLD: 66.3 % (ref 44–72)
NITRITE UR QL STRIP.AUTO: NEGATIVE
NRBC # BLD AUTO: 0 K/UL
NRBC BLD-RTO: 0 /100 WBC (ref 0–0.2)
NUMBER OF RH DOSES IND 8505RD: NORMAL
PH UR STRIP.AUTO: 6 [PH] (ref 5–8)
PLATELET # BLD AUTO: 297 K/UL (ref 164–446)
PMV BLD AUTO: 9.6 FL (ref 9–12.9)
POTASSIUM SERPL-SCNC: 3.7 MMOL/L (ref 3.6–5.5)
PROT SERPL-MCNC: 7.5 G/DL (ref 6–8.2)
PROT UR QL STRIP: NEGATIVE MG/DL
RBC # BLD AUTO: 4.24 M/UL (ref 4.2–5.4)
RBC # URNS HPF: ABNORMAL /HPF (ref 0–2)
RBC UR QL AUTO: ABNORMAL
RH BLD: NORMAL
SODIUM SERPL-SCNC: 137 MMOL/L (ref 135–145)
SP GR UR STRIP.AUTO: 1.01
UROBILINOGEN UR STRIP.AUTO-MCNC: 1 EU/DL
WBC # BLD AUTO: 11.3 K/UL (ref 4.8–10.8)
WBC #/AREA URNS HPF: ABNORMAL /HPF

## 2024-12-16 PROCEDURE — 81001 URINALYSIS AUTO W/SCOPE: CPT

## 2024-12-16 PROCEDURE — 80053 COMPREHEN METABOLIC PANEL: CPT

## 2024-12-16 PROCEDURE — 99214 OFFICE O/P EST MOD 30 MIN: CPT | Performed by: FAMILY MEDICINE

## 2024-12-16 PROCEDURE — 700111 HCHG RX REV CODE 636 W/ 250 OVERRIDE (IP): Mod: UD | Performed by: EMERGENCY MEDICINE

## 2024-12-16 PROCEDURE — 76817 TRANSVAGINAL US OBSTETRIC: CPT

## 2024-12-16 PROCEDURE — 36415 COLL VENOUS BLD VENIPUNCTURE: CPT

## 2024-12-16 PROCEDURE — 99284 EMERGENCY DEPT VISIT MOD MDM: CPT

## 2024-12-16 PROCEDURE — 84702 CHORIONIC GONADOTROPIN TEST: CPT

## 2024-12-16 PROCEDURE — 85025 COMPLETE CBC W/AUTO DIFF WBC: CPT

## 2024-12-16 PROCEDURE — 86901 BLOOD TYPING SEROLOGIC RH(D): CPT

## 2024-12-16 RX ORDER — ONDANSETRON 4 MG/1
4 TABLET, ORALLY DISINTEGRATING ORAL ONCE
Status: COMPLETED | OUTPATIENT
Start: 2024-12-16 | End: 2024-12-16

## 2024-12-16 RX ADMIN — ONDANSETRON 4 MG: 4 TABLET, ORALLY DISINTEGRATING ORAL at 17:45

## 2024-12-16 ASSESSMENT — FIBROSIS 4 INDEX
FIB4 SCORE: .5838051064926238767
FIB4 SCORE: .5838051064926238767

## 2024-12-16 NOTE — PROGRESS NOTES
"  Subjective:      24 y.o. female presents to urgent care for vaginal spotting that started this morning.  She is 4 weeks and 2 days pregnant, with the first day of her LMP being 11/15/2024.  She started having vaginal bleeding this morning that has been progressively worsening.  She is wearing a pad, but has not had to change it yet.  She has associated abdominal cramping.  .    She denies any other questions or concerns at this time.    Current problem list, medication, and past medical/surgical history were reviewed in Epic.    ROS  See HPI     Objective:      /74 (BP Location: Left arm, Patient Position: Sitting, BP Cuff Size: Adult)   Pulse 94   Temp 36.9 °C (98.4 °F) (Temporal)   Resp 14   Ht 1.676 m (5' 6\")   Wt 68.6 kg (151 lb 3.2 oz)   SpO2 99%   BMI 24.40 kg/m²     Physical Exam  Constitutional:       General: She is not in acute distress.     Appearance: She is not diaphoretic.   Cardiovascular:      Rate and Rhythm: Normal rate and regular rhythm.      Heart sounds: Normal heart sounds.   Pulmonary:      Effort: Pulmonary effort is normal. No respiratory distress.      Breath sounds: Normal breath sounds.   Abdominal:      General: Bowel sounds are normal.      Palpations: Abdomen is soft.      Tenderness: There is no abdominal tenderness.   Neurological:      Mental Status: She is alert.   Psychiatric:         Mood and Affect: Affect normal.         Judgment: Judgment normal.       Assessment/Plan:     1. Less than 8 weeks gestation of pregnancy  2. Vagina bleeding  3. Abdominal cramping  At this time, I feel the patient requires a higher level of care in the ED for closer monitoring, stat lab work and/or imaging for further evaluation. This has been discussed with the patient and she states agreement and understanding. The patient is stable without the need for continuous monitoring and therefore will go via private vehicle to [facility] without delay.        Instructed to return to " Urgent Care or nearest Emergency Department if symptoms fail to improve, for any change in condition, further concerns, or new concerning symptoms. Patient states understanding of the plan of care and discharge instructions.    Cally Benites M.D.

## 2024-12-16 NOTE — ED PROVIDER NOTES
ED Provider Note    CHIEF COMPLAINT  Chief Complaint   Patient presents with    Vaginal Bleeding     Patient reports moderate amounts of vaginal bleeding that started this morning. Patient reports she is approx 4 weeks pregnant. Patient endorses associated abdominal cramping as well.        EXTERNAL RECORDS REVIEWED  Outpatient Notes patient seen in [place] urgent care earlier today and then transferred to this facility for further care.    HPI/ROS  LIMITATION TO HISTORY   Select: : None  OUTSIDE HISTORIAN(S):  Significant other at bedside    Niki Riley is a 24 y.o. female who presents to the emergency department for vaginal bleeding.  G6, P1 with now 5 miscarriages.  Previously documented dates at 4 weeks and 2 days today.  Followed by local outpatient gynecologic clinic.  Today with increased vaginal bleeding and now negative home pregnancy test.  Concern for recurrent miscarriage.    PAST MEDICAL HISTORY   has a past medical history of Asthma, Migraine, Shoulder pain (8/8/2014), and Urinary tract infection.    SURGICAL HISTORY   has a past surgical history that includes gastroscopy (5/24/2014) and pelviscopy (9/5/2018).    FAMILY HISTORY  Family History   Problem Relation Age of Onset    Anxiety disorder Mother     Heart Disease Maternal Grandfather        SOCIAL HISTORY  Social History     Tobacco Use    Smoking status: Never    Smokeless tobacco: Never    Tobacco comments:     vapes   Vaping Use    Vaping status: Every Day    Substances: Flavoring    Devices: Refillable tank   Substance and Sexual Activity    Alcohol use: Yes     Comment: bi-weekly    Drug use: No    Sexual activity: Yes     Partners: Male     Birth control/protection: None       CURRENT MEDICATIONS  Home Medications       Reviewed by Ailin Rodriguez R.N. (Registered Nurse) on 12/16/24 at 1405  Med List Status: Partial     Medication Last Dose Status   albuterol 108 (90 Base) MCG/ACT Aero Soln inhalation aerosol  Active   Alcohol  "Swabs  Active   Alcohol Swabs  Active   Blood Glucose Meter Kit  Active   Blood Glucose Monitoring Suppl (ONETOUCH VERIO REFLECT) w/Device Kit  Active   Blood Glucose Test Strips  Active   Lancets  Active   methylPREDNISolone (MEDROL DOSEPAK) 4 MG Tablet Therapy Pack  Active   naproxen (NAPROSYN) 500 MG Tab  Active   omeprazole (PRILOSEC) 20 MG delayed-release capsule  Active   ONETOUCH VERIO strip  Active   Prenatal Vit-Fe Fumarate-FA (PRENATAL/FOLIC ACID PO)  Active                  Audit from Redirected Encounters    **Home medications have not yet been reviewed for this encounter**         ALLERGIES  Allergies   Allergen Reactions    Sulfa Drugs     Honey Dew     Kiwi Extract Anaphylaxis     Clarified by dietary staff    Lime Food Allergy Hives     Rash     Other Misc Swelling     Hair dye        PHYSICAL EXAM  VITAL SIGNS: /67   Pulse 79   Temp 36.2 °C (97.2 °F) (Temporal)   Resp 18   Ht 1.676 m (5' 6\")   Wt 68.4 kg (150 lb 12.7 oz)   LMP 11/15/2024   SpO2 99%   BMI 24.34 kg/m²      Pulse ox interpretation: I interpret this pulse ox as normal.  Constitutional: Alert in no apparent distress.  HENT: No signs of trauma, Bilateral external ears normal, Nose normal.   Eyes: Pupils are equal and reactive  Neck: Normal range of motion, No tenderness, Supple  Cardiovascular: Regular rate and rhythm, no murmurs.   Thorax & Lungs: Normal breath sounds, No respiratory distress  Abdomen: Bowel sounds normal, Soft, No tenderness  Skin: Warm, Dry, No erythema, No rash.   Musculoskeletal: Good range of motion in all major joints. No tenderness to palpation or major deformities noted.   Neurologic: Alert , Normal motor function, Normal sensory function, No focal deficits noted.   Psychiatric: Affect normal, Judgment normal, Mood normal.         EKG/LABS  Results for orders placed or performed during the hospital encounter of 12/16/24   CBC WITH DIFFERENTIAL    Collection Time: 12/16/24  3:13 PM   Result Value Ref " Range    WBC 11.3 (H) 4.8 - 10.8 K/uL    RBC 4.24 4.20 - 5.40 M/uL    Hemoglobin 12.4 12.0 - 16.0 g/dL    Hematocrit 37.1 37.0 - 47.0 %    MCV 87.5 81.4 - 97.8 fL    MCH 29.2 27.0 - 33.0 pg    MCHC 33.4 32.2 - 35.5 g/dL    RDW 38.4 35.9 - 50.0 fL    Platelet Count 297 164 - 446 K/uL    MPV 9.6 9.0 - 12.9 fL    Neutrophils-Polys 66.30 44.00 - 72.00 %    Lymphocytes 22.80 22.00 - 41.00 %    Monocytes 7.90 0.00 - 13.40 %    Eosinophils 2.20 0.00 - 6.90 %    Basophils 0.40 0.00 - 1.80 %    Immature Granulocytes 0.40 0.00 - 0.90 %    Nucleated RBC 0.00 0.00 - 0.20 /100 WBC    Neutrophils (Absolute) 7.47 (H) 1.82 - 7.42 K/uL    Lymphs (Absolute) 2.57 1.00 - 4.80 K/uL    Monos (Absolute) 0.89 (H) 0.00 - 0.85 K/uL    Eos (Absolute) 0.25 0.00 - 0.51 K/uL    Baso (Absolute) 0.05 0.00 - 0.12 K/uL    Immature Granulocytes (abs) 0.04 0.00 - 0.11 K/uL    NRBC (Absolute) 0.00 K/uL   COMP METABOLIC PANEL    Collection Time: 12/16/24  3:13 PM   Result Value Ref Range    Sodium 137 135 - 145 mmol/L    Potassium 3.7 3.6 - 5.5 mmol/L    Chloride 106 96 - 112 mmol/L    Co2 22 20 - 33 mmol/L    Anion Gap 9.0 7.0 - 16.0    Glucose 108 (H) 65 - 99 mg/dL    Bun 8 8 - 22 mg/dL    Creatinine 0.77 0.50 - 1.40 mg/dL    Calcium 9.6 8.5 - 10.5 mg/dL    Correct Calcium 9.4 8.5 - 10.5 mg/dL    AST(SGOT) 19 12 - 45 U/L    ALT(SGPT) 12 2 - 50 U/L    Alkaline Phosphatase 49 30 - 99 U/L    Total Bilirubin 0.3 0.1 - 1.5 mg/dL    Albumin 4.3 3.2 - 4.9 g/dL    Total Protein 7.5 6.0 - 8.2 g/dL    Globulin 3.2 1.9 - 3.5 g/dL    A-G Ratio 1.3 g/dL   HCG QUANTITATIVE    Collection Time: 12/16/24  3:13 PM   Result Value Ref Range    Bhcg 4.3 0.0 - 5.0 mIU/mL   URINALYSIS,CULTURE IF INDICATED    Collection Time: 12/16/24  3:13 PM    Specimen: Urine   Result Value Ref Range    Color Orange (A)     Character Clear     Specific Gravity 1.010 <1.035    Ph 6.0 5.0 - 8.0    Glucose Negative Negative mg/dL    Ketones Negative Negative mg/dL    Protein Negative  Negative mg/dL    Bilirubin Negative Negative    Urobilinogen, Urine 1.0 <=1.0 EU/dL    Nitrite Negative Negative    Leukocyte Esterase Small (A) Negative    Occult Blood Large (A) Negative    Micro Urine Req Microscopic    URINE MICROSCOPIC (W/UA)    Collection Time: 12/16/24  3:13 PM   Result Value Ref Range    WBC 6-10 (A) /hpf    RBC 0-2 0 - 2 /hpf    Bacteria Few (A) None /hpf    Epithelial Cells 3-5 0 - 5 /hpf    Urine Casts 0-2 0 - 2 /lpf   ESTIMATED GFR    Collection Time: 12/16/24  3:13 PM   Result Value Ref Range    GFR (CKD-EPI) 110 >60 mL/min/1.73 m 2   RH TYPE FOR RHOGAM FROM E.D.    Collection Time: 12/16/24  4:48 PM   Result Value Ref Range    Emergency Department Rh Typing POS     Number Of Rh Doses Indicated ZERO            RADIOLOGY/PROCEDURES   I have independently interpreted the diagnostic imaging associated with this visit and am waiting the final reading from the radiologist.   My preliminary interpretation is as follows: No obvious IUP    Radiologist interpretation:  US-OB 1ST TRIMESTER WITH TRANSVAGINAL (COMBO)   Final Result      1.  No intrauterine pregnancy. Sonographic imaging follow-up and correlation was serial serum beta-hCG levels is recommended.   2.  No evidence for ectopic pregnancy.          COURSE & MEDICAL DECISION MAKING    ASSESSMENT, COURSE AND PLAN  Care Narrative: 24-year-old presenting to the emerged part movement with vaginal bleeding while pregnant.  No local OB/GYN.  Will get screening labs and ultrasound imaging    DISPOSITION AND DISCUSSIONS  I have discussed management of the patient with the following physicians and MAME's: None    Discussion of management with other [facility] or appropriate source(s): None     Escalation of care considered, and ultimately not performed:acute inpatient care management, however at this time, the patient is most appropriate for outpatient management    Barriers to care at this time, including but not limited to:  None .     Decision tools  and prescription drugs considered including, but not limited to: Antibiotics provided for UTI .    24-year-old presenting to the emerged part with above presentation.  Beta-hCG and ultrasound as above.  Likely miscarriage.  Patient does have evidence of likely cystitis.  Will be treated empirically with antibiotics.  Understanding and need to follow-up with outpatient OB/GYN as referred today.  Will return to the ER with any change or worsening or new symptoms.        FINAL DIAGNOSIS  1. Miscarriage    2. Acute UTI         Electronically signed by: Ankur Perez M.D., 12/16/2024 3:16 PM

## 2024-12-27 ENCOUNTER — OFFICE VISIT (OUTPATIENT)
Dept: URGENT CARE | Facility: PHYSICIAN GROUP | Age: 24
End: 2024-12-27
Payer: MEDICAID

## 2024-12-27 VITALS
HEIGHT: 66 IN | DIASTOLIC BLOOD PRESSURE: 60 MMHG | TEMPERATURE: 97.8 F | HEART RATE: 83 BPM | OXYGEN SATURATION: 98 % | RESPIRATION RATE: 20 BRPM | BODY MASS INDEX: 23.75 KG/M2 | WEIGHT: 147.8 LBS | SYSTOLIC BLOOD PRESSURE: 110 MMHG

## 2024-12-27 DIAGNOSIS — N30.00 ACUTE CYSTITIS WITHOUT HEMATURIA: ICD-10-CM

## 2024-12-27 DIAGNOSIS — N30.01 ACUTE CYSTITIS WITH HEMATURIA: ICD-10-CM

## 2024-12-27 DIAGNOSIS — N12 PYELONEPHRITIS: ICD-10-CM

## 2024-12-27 DIAGNOSIS — R30.0 DYSURIA: ICD-10-CM

## 2024-12-27 LAB
APPEARANCE UR: CLEAR
BILIRUB UR STRIP-MCNC: NORMAL MG/DL
COLOR UR AUTO: YELLOW
GLUCOSE UR STRIP.AUTO-MCNC: NORMAL MG/DL
KETONES UR STRIP.AUTO-MCNC: NORMAL MG/DL
LEUKOCYTE ESTERASE UR QL STRIP.AUTO: NORMAL
NITRITE UR QL STRIP.AUTO: NORMAL
PH UR STRIP.AUTO: 5.5 [PH] (ref 5–8)
PROT UR QL STRIP: NORMAL MG/DL
RBC UR QL AUTO: NORMAL
SP GR UR STRIP.AUTO: 1.02
UROBILINOGEN UR STRIP-MCNC: 0.2 MG/DL

## 2024-12-27 PROCEDURE — 99213 OFFICE O/P EST LOW 20 MIN: CPT | Mod: 25

## 2024-12-27 PROCEDURE — 3078F DIAST BP <80 MM HG: CPT

## 2024-12-27 PROCEDURE — 3074F SYST BP LT 130 MM HG: CPT

## 2024-12-27 PROCEDURE — 81002 URINALYSIS NONAUTO W/O SCOPE: CPT

## 2024-12-27 RX ORDER — CIPROFLOXACIN 500 MG/1
500 TABLET, FILM COATED ORAL 2 TIMES DAILY
Qty: 14 TABLET | Refills: 0 | Status: SHIPPED | OUTPATIENT
Start: 2024-12-27 | End: 2024-12-29

## 2024-12-27 ASSESSMENT — FIBROSIS 4 INDEX: FIB4 SCORE: 0.44

## 2024-12-28 NOTE — PROGRESS NOTES
"Subjective     Niki Berkley Riley is a 24 y.o. female who presents with Allergic Reaction (Amoxclauv prescribed in ER for uti and mono pt took and had dizziness, fainting, nausea, hives on arms and legs so pt stopped meds and found ou her sisters are all allergic to Augmentin as well  and is requesting new antibiotic to treat uti she has as she is feeling worse )            Dysuria   This is a new problem. The current episode started 1 to 4 weeks ago. The problem occurs every urination. The problem has been unchanged. The quality of the pain is described as burning. The pain is mild. There has been no fever. She is Sexually active. There is No history of pyelonephritis. Associated symptoms include frequency, nausea and urgency. Pertinent negatives include no chills, flank pain, hematuria, hesitancy, possible pregnancy or vomiting. She has tried acetaminophen for the symptoms. The treatment provided mild relief. There is no history of catheterization, kidney stones, recurrent UTIs or a single kidney.       Review of Systems   Constitutional:  Negative for chills and fever.   Respiratory:  Negative for cough, shortness of breath and wheezing.    Cardiovascular:  Negative for chest pain and palpitations.   Gastrointestinal:  Positive for nausea. Negative for abdominal pain, diarrhea and vomiting.   Genitourinary:  Positive for dysuria, frequency and urgency. Negative for flank pain, hematuria and hesitancy.   Neurological:  Negative for dizziness, weakness and headaches.              Objective     /60   Pulse 83   Temp 36.6 °C (97.8 °F) (Temporal)   Resp 20   Ht 1.676 m (5' 6\")   Wt 67 kg (147 lb 12.8 oz)   LMP 11/15/2024   SpO2 98%   BMI 23.86 kg/m²      Physical Exam  Constitutional:       General: She is not in acute distress.     Appearance: Normal appearance. She is not ill-appearing.   HENT:      Head: Normocephalic and atraumatic.      Right Ear: Tympanic membrane is not erythematous.      " Left Ear: Tympanic membrane is not erythematous.      Nose: No congestion.      Mouth/Throat:      Mouth: Mucous membranes are moist.   Eyes:      Extraocular Movements: Extraocular movements intact.      Conjunctiva/sclera: Conjunctivae normal.      Pupils: Pupils are equal, round, and reactive to light.   Cardiovascular:      Rate and Rhythm: Normal rate and regular rhythm.   Pulmonary:      Effort: Pulmonary effort is normal. No respiratory distress.      Breath sounds: No stridor. No wheezing.   Abdominal:      Tenderness: There is no right CVA tenderness or left CVA tenderness.   Musculoskeletal:         General: Normal range of motion.   Skin:     General: Skin is dry.   Neurological:      General: No focal deficit present.      Mental Status: She is alert and oriented to person, place, and time. Mental status is at baseline.      Motor: No weakness.                           Assessment & Plan     This is an acute condition.  Niki is a pleasant 24-year-old female presenting to clinic today with complaints of dysuria, urinary urgency and frequency starting over one week ago.  Patient was seen in ED 12/16/24 for vaginal bleeding related to miscarriage.  A UA in ED showed acute cystitis, for which she was prescribed Augmentin.  She reports she did not fill prescription for antibiotic prior to discharge.  Patient was asymptomatic at time of visit, but experienced symptoms one week later, for which she remembered to fill her antibiotic.  She took one dose of Augmentin 12/25, and started to experience generalized urticaria, nausea, vomiting, and dizziness.  Patient reports family history of penicillin allergy.  She did not take additional doses of antibiotic, and is requesting different antibiotic for further treatment of acute cystitis symptoms.    Assessment & Plan  Acute cystitis without hematuria    Orders:  •  nitrofurantoin (MACROBID) 100 MG Cap; Take 1 Capsule by mouth 2 times a day for 5 days.  Patient  denying hematuria, flank pain, fever, or abdominal pain.  Mild suprapubic pain upon palpation, associated with recent miscarriage or cystitis.    Will treat with Cipro BID for 7 days despite improved UA to prevent complications associated with cystitis.    Encouraged to increase fluid intake to maintain pale colored urine for the next 5-7 days.  She can take Azo OTC for dysuria relief prn, in addition to tylenol/ibuprofen for additional symptoms.  If patient develops similar urticaria with Cipro, encouraged to take antihistamine OTC and return to clinic to change antibiotic.  If she develops hematuria, flank pain, fevers, abdominal pain, shortness of breath, dizziness, or chest pain to report back to ED immediately.  Advised of risk of not doing so.    Patient understands and is agreeable to treatment plan.  Denies further questions.    Dysuria    Orders:  •  POCT Urinalysis    Results for orders placed or performed in visit on 12/27/24   POCT Urinalysis    Collection Time: 12/27/24  6:38 PM   Result Value Ref Range    POC Color yellow Negative    POC Appearance clear Negative    POC Glucose neg Negative mg/dL    POC Bilirubin neg Negative mg/dL    POC Ketones neg Negative mg/dL    POC Specific Gravity 1.020 <1.005 - >1.030    POC Blood large Negative    POC Urine PH 5.5 5.0 - 8.0    POC Protein trace Negative mg/dL    POC Urobiligen 0.2 Negative (0.2) mg/dL    POC Nitrites neg Negative    POC Leukocyte Esterase neg Negative       Acute cystitis with hematuria         Pyelonephritis    Orders:  •  ciprofloxacin (CIPRO) 500 MG Tab; Take 1 Tablet by mouth 2 times a day for 7 days.

## 2024-12-29 RX ORDER — NITROFURANTOIN 25; 75 MG/1; MG/1
100 CAPSULE ORAL 2 TIMES DAILY
Qty: 10 CAPSULE | Refills: 0 | Status: SHIPPED | OUTPATIENT
Start: 2024-12-29 | End: 2025-01-03

## 2024-12-29 RX ORDER — CIPROFLOXACIN 500 MG/1
500 TABLET, FILM COATED ORAL 2 TIMES DAILY
Qty: 14 TABLET | Refills: 0 | Status: SHIPPED | OUTPATIENT
Start: 2024-12-29 | End: 2025-01-05

## 2024-12-29 ASSESSMENT — ENCOUNTER SYMPTOMS
WHEEZING: 0
NAUSEA: 1
DIZZINESS: 0
DIARRHEA: 0
FLANK PAIN: 0
CHILLS: 0
WEAKNESS: 0
SHORTNESS OF BREATH: 0
VOMITING: 0
COUGH: 0
PALPITATIONS: 0
ABDOMINAL PAIN: 0
FEVER: 0
HEADACHES: 0

## 2025-01-21 ENCOUNTER — APPOINTMENT (OUTPATIENT)
Dept: MEDICAL GROUP | Facility: CLINIC | Age: 25
End: 2025-01-21
Payer: MEDICAID

## 2025-01-27 ENCOUNTER — OFFICE VISIT (OUTPATIENT)
Dept: MEDICAL GROUP | Facility: CLINIC | Age: 25
End: 2025-01-27
Payer: MEDICAID

## 2025-01-27 VITALS
TEMPERATURE: 98.2 F | OXYGEN SATURATION: 98 % | WEIGHT: 146.83 LBS | DIASTOLIC BLOOD PRESSURE: 68 MMHG | BODY MASS INDEX: 23.6 KG/M2 | HEIGHT: 66 IN | RESPIRATION RATE: 12 BRPM | HEART RATE: 91 BPM | SYSTOLIC BLOOD PRESSURE: 90 MMHG

## 2025-01-27 DIAGNOSIS — M79.18 MYALGIA OF MUSCLE OF NECK: ICD-10-CM

## 2025-01-27 DIAGNOSIS — M25.511 CHRONIC RIGHT SHOULDER PAIN: ICD-10-CM

## 2025-01-27 DIAGNOSIS — N30.01 ACUTE CYSTITIS WITH HEMATURIA: ICD-10-CM

## 2025-01-27 DIAGNOSIS — G89.29 CHRONIC RIGHT SHOULDER PAIN: ICD-10-CM

## 2025-01-27 DIAGNOSIS — R11.0 NAUSEA: ICD-10-CM

## 2025-01-27 DIAGNOSIS — M50.30 DDD (DEGENERATIVE DISC DISEASE), CERVICAL: ICD-10-CM

## 2025-01-27 DIAGNOSIS — L30.9 ECZEMA, UNSPECIFIED TYPE: ICD-10-CM

## 2025-01-27 DIAGNOSIS — O03.9 MISCARRIAGE: ICD-10-CM

## 2025-01-27 PROCEDURE — 99214 OFFICE O/P EST MOD 30 MIN: CPT | Performed by: PHYSICIAN ASSISTANT

## 2025-01-27 PROCEDURE — 3078F DIAST BP <80 MM HG: CPT | Performed by: PHYSICIAN ASSISTANT

## 2025-01-27 PROCEDURE — 3074F SYST BP LT 130 MM HG: CPT | Performed by: PHYSICIAN ASSISTANT

## 2025-01-27 RX ORDER — ONDANSETRON 8 MG/1
8 TABLET, ORALLY DISINTEGRATING ORAL EVERY 8 HOURS PRN
Qty: 60 TABLET | Refills: 2 | Status: SHIPPED | OUTPATIENT
Start: 2025-01-27

## 2025-01-27 RX ORDER — ONDANSETRON 8 MG/1
8 TABLET, ORALLY DISINTEGRATING ORAL EVERY 8 HOURS PRN
COMMUNITY
End: 2025-01-27 | Stop reason: SDUPTHER

## 2025-01-27 RX ORDER — TRIAMCINOLONE ACETONIDE 1 MG/G
CREAM TOPICAL
Qty: 30 G | Refills: 0 | Status: SHIPPED | OUTPATIENT
Start: 2025-01-27

## 2025-01-27 RX ORDER — LORATADINE 10 MG/1
10 TABLET ORAL DAILY
COMMUNITY

## 2025-01-27 ASSESSMENT — FIBROSIS 4 INDEX: FIB4 SCORE: 0.44

## 2025-01-27 NOTE — PROGRESS NOTES
cc:  MRI results    Subjective:     Niki Riley is a 24 y.o. female presenting for MRI results        History of Present Illness  The patient is a 24-year-old female who presents to the office today to follow up with the MRI of her cervical spine.    She reports experiencing pain predominantly on the right side, specifically in the right shoulder blade. She also notes the onset of mild pain on the left side, which she attributes to increased use of her left side as a compensatory mechanism. She has not engaged in physical therapy for some time. Additionally, she reports persistent pain, spasms, numbness, and tingling in her shoulder blade.    She has observed a red tristen on the right side of her breast, initially suspected to be ringworm contracted from her son. Despite applying an antifungal medication, the tristen has enlarged. She describes the tristen as itchy and painful, with associated paranoia. She also reports flaking of the skin over the tristen. She has a history of eczema, typically manifesting on the back of her head and legs, but never previously on her breast.    She requests a refill of her ondansetron prescription, noting that she has only three doses remaining.    She experienced a miscarriage and was diagnosed with a urinary tract infection (UTI) during her emergency room visit. She was prescribed antibiotics for the UTI, which led to an allergic reaction characterized by hives and episodes of unconsciousness. She was subsequently switched to a different antibiotic, which she tolerated well.    FAMILY HISTORY  The patient mentions that her family is allergic to AUGMENTIN.    ALLERGIES  The patient is allergic to AUGMENTIN.    MEDICATIONS  Current: Ondansetron       Review of systems:  See above.   Denies any symptoms unless previously indicated.        Current Outpatient Medications:     loratadine (CLARITIN) 10 MG Tab, Take 10 mg by mouth every day., Disp: , Rfl:     ondansetron (ZOFRAN ODT)  "8 MG TABLET DISPERSIBLE, Take 8 mg by mouth every 8 hours as needed for Nausea., Disp: , Rfl:     Prenatal Vit-Fe Fumarate-FA (PRENATAL/FOLIC ACID PO), Take  by mouth every day. 2 gummies a day, Disp: , Rfl:     ONETOUCH VERIO strip, 1 Strip by Other route as needed (dizziness)., Disp: 100 Strip, Rfl: 3    Lancets, Use one One Touch Verio Flex lancet to test blood sugar once daily ., Disp: 100 Each, Rfl: 3    Alcohol Swabs, Wipe site with prep pad prior to injection., Disp: 100 Each, Rfl: 3    albuterol 108 (90 Base) MCG/ACT Aero Soln inhalation aerosol, Inhale 2 Puffs every 6 hours as needed for Shortness of Breath., Disp: 18 g, Rfl: 2    Blood Glucose Monitoring Suppl (ONETOUCH VERIO REFLECT) w/Device Kit, USE AS DIRECTED, Disp: , Rfl:     Blood Glucose Test Strips, Use one One Touch Verio strip to test blood sugar four times daily., Disp: 400 Strip, Rfl: 2    Blood Glucose Meter Kit, Test blood sugar as recommended by provider. One Touch Verio blood glucose monitoring kit., Disp: 1 Kit, Rfl: 0    Alcohol Swabs, Wipe site with prep pad prior to injection., Disp: 200 Each, Rfl: 2    Allergies, past medical history, past surgical history, family history, social history reviewed and updated    Objective:     Vitals: BP 90/68   Pulse 91   Temp 36.8 °C (98.2 °F) (Temporal)   Resp 12   Ht 1.676 m (5' 6\")   Wt 66.6 kg (146 lb 13.2 oz)   LMP 11/15/2024   SpO2 98%   BMI 23.70 kg/m²   General: Alert, pleasant, NAD  EYES:   PERRL, EOMI, no icterus or pallor.  Conjunctivae and lids normal.   HENT:  Normocephalic.  External ears normal.  Neck supple.    Respiratory: Normal respiratory effort.    Abdomen: Not obese  Skin: Warm, dry, no rashes.  Dermatitis 1cm long between right breast and axilla that is dry and scaly and moderately red.  Musculoskeletal: Gait is normal.  Moves all extremities well.    Extremities: normal range of motion all extremities.   Neurological: No tremors, sensation grossly intact, CN2-12 " intact.  Psych:  Affect/mood is normal, judgement is good, memory is intact, grooming is appropriate.    Results  Laboratory Studies  Blood sugar was slightly elevated.    Imaging  MRI of cervical spine shows mild degenerative neck, T1 and T2 may represent a hemangioma, C3-C4 left disc protrusion, C4-C5 small left-sided disc bulge, C5-C6 minimal left disc bulge, C6-C7 small disc bulge, C7-T1 tiny disc bulge.   MR-CERVICAL SPINE-W/O  Order: [id number]   Status: Final result       Visible to patient: Yes (seen)       Next appt: None       Dx: Chronic right shoulder pain; Pain of ...    1 Result Note       1 Patient Communication  Details    Reading Physician Reading Date Result Priority   Edwardo Buchanan M.D.  [phone] 11/22/2024      Narrative & Impression     11/22/2024 2:38 PM     HISTORY/REASON FOR EXAM:  neck pain persisting with spasming.        TECHNIQUE/EXAM DESCRIPTION:  MRI of the cervical spine without contrast.     The study was performed on a [employer] Signa 1.5 Angie MRI scanner.  T1 sagittal, T2 fast spin-echo sagittal, and gradient echo axial images were obtained of the cervical spine. Additional T2 axial images were also obtained.     COMPARISON: Radiograph dated 9/5/2024     FINDINGS:  The visualized intracranial structures are unremarkable. Cord is normal in caliber and signal intensity.     Cervical vertebral body height, alignment, and signal intensity are within normal limits. There is a rounded area of high T2-weighted signal intensity within the T2 vertebral body which may represent a hemangioma.     At the C2-C3 level, there is no disc herniation, canal stenosis, or foraminal narrowing.     At the C3-C4 level, there is a left paracentral disc protrusion extending towards the foraminal region. There is mild effacement of the anterior aspect of the thecal sac on the left. There is no significant foraminal narrowing or nerve root impingement.     At the C4-C5 level, there is a small left sided disc  bulge without significant canal stenosis or foraminal narrowing.     At the C5-C6 level, there is minimal left paracentral disc bulge. There is no significant canal stenosis or foraminal narrowing.     At the C6-C7 level, there is a very small left paracentral disc bulge. There is no canal stenosis or foraminal narrowing.     At the C7-T1 level, there are tiny foraminal disc bulges. There is no significant canal stenosis or foraminal narrowing.     IMPRESSION:        1. Mild multilevel degenerative disc disease with a small left paracentral protrusion at C3-C4.  2. There is no significant canal stenosis or foraminal narrowing.       Assessment/Plan:     There are no diagnoses linked to this encounter.    Assessment & Plan  1. Cervical Disc Bulging.  The MRI of the cervical spine revealed multiple disc bulges, including a left disc protrusion at C3-C4, a small left-sided disc bulge at C4-C5, minimal left disc bulge at C5-C6, and a small disc bulge at C6-C7. Despite the left-sided findings, the patient reports right-sided pain. A referral to physical therapy has been made to address the cervical disc bulging. An MRI of the shoulder has been ordered to further investigate the cause of the pain. A referral to orthopedics has also been initiated for a comprehensive evaluation.    2. Right Shoulder Pain.  The patient reports persistent pain, spasms, numbness, and tingling in the right shoulder blade. An MRI of the shoulder has been ordered to determine the underlying cause. A referral to physical therapy has been made to manage the shoulder pain. A referral to orthopedics has also been initiated for further evaluation.    3. Eczema.  The patient has a red tristen on the right side of her breast, which appears to be eczema or atopic dermatitis. A prescription for triamcinolone cream has been provided, with instructions to apply a small amount twice daily for a maximum duration of 10 days. If the condition does not improve  within this period, an alternative treatment approach will be considered.    4. nausea  A refill for ondansetron has been issued.    5. History of Miscarriage and UTI.  The patient had a miscarriage and was treated for a UTI with antibiotics. She experienced an allergic reaction to Augmentin, which caused hives and episodes of unconsciousness. Her blood work was normal except for slightly elevated blood sugar.     Follow-up  The patient will follow up after the completion of the shoulder MRI.    No follow-ups on file.    Please note that this dictation was created using voice recognition software. I have made every reasonable attempt to correct obvious errors, but expect that there are errors of grammar and possible content that I did not discover before finalizing note.

## 2025-01-29 ENCOUNTER — HOSPITAL ENCOUNTER (OUTPATIENT)
Dept: RADIOLOGY | Facility: MEDICAL CENTER | Age: 25
End: 2025-01-29
Attending: PHYSICIAN ASSISTANT
Payer: MEDICAID

## 2025-01-29 DIAGNOSIS — G89.29 CHRONIC RIGHT SHOULDER PAIN: ICD-10-CM

## 2025-01-29 DIAGNOSIS — M25.511 CHRONIC RIGHT SHOULDER PAIN: ICD-10-CM

## 2025-01-29 PROCEDURE — 73221 MRI JOINT UPR EXTREM W/O DYE: CPT | Mod: RT

## 2025-03-11 ENCOUNTER — TELEPHONE (OUTPATIENT)
Dept: HEALTH INFORMATION MANAGEMENT | Facility: OTHER | Age: 25
End: 2025-03-11
Payer: MEDICAID

## 2025-03-25 ENCOUNTER — TELEPHONE (OUTPATIENT)
Dept: HEALTH INFORMATION MANAGEMENT | Facility: OTHER | Age: 25
End: 2025-03-25

## 2025-03-25 ENCOUNTER — HOSPITAL ENCOUNTER (OUTPATIENT)
Dept: RADIOLOGY | Facility: MEDICAL CENTER | Age: 25
End: 2025-03-25
Attending: NURSE PRACTITIONER
Payer: MEDICAID

## 2025-03-25 ENCOUNTER — HOSPITAL ENCOUNTER (OUTPATIENT)
Facility: MEDICAL CENTER | Age: 25
End: 2025-03-25
Attending: NURSE PRACTITIONER
Payer: MEDICAID

## 2025-03-25 ENCOUNTER — TELEPHONE (OUTPATIENT)
Dept: URGENT CARE | Facility: PHYSICIAN GROUP | Age: 25
End: 2025-03-25

## 2025-03-25 ENCOUNTER — OFFICE VISIT (OUTPATIENT)
Dept: URGENT CARE | Facility: PHYSICIAN GROUP | Age: 25
End: 2025-03-25
Payer: MEDICAID

## 2025-03-25 VITALS
OXYGEN SATURATION: 99 % | DIASTOLIC BLOOD PRESSURE: 70 MMHG | BODY MASS INDEX: 23.95 KG/M2 | SYSTOLIC BLOOD PRESSURE: 110 MMHG | TEMPERATURE: 97.8 F | HEIGHT: 66 IN | RESPIRATION RATE: 18 BRPM | HEART RATE: 99 BPM | WEIGHT: 149 LBS

## 2025-03-25 DIAGNOSIS — R82.90 ABNORMAL URINALYSIS: ICD-10-CM

## 2025-03-25 DIAGNOSIS — R10.2 PELVIC PAIN: ICD-10-CM

## 2025-03-25 DIAGNOSIS — R42 DIZZINESS: ICD-10-CM

## 2025-03-25 DIAGNOSIS — R68.89 FLU-LIKE SYMPTOMS: ICD-10-CM

## 2025-03-25 DIAGNOSIS — Z3A.01 LESS THAN 8 WEEKS GESTATION OF PREGNANCY: ICD-10-CM

## 2025-03-25 DIAGNOSIS — N30.01 ACUTE CYSTITIS WITH HEMATURIA: ICD-10-CM

## 2025-03-25 LAB
APPEARANCE UR: CLEAR
BILIRUB UR STRIP-MCNC: NORMAL MG/DL
COLOR UR AUTO: YELLOW
FLUAV RNA SPEC QL NAA+PROBE: NEGATIVE
FLUBV RNA SPEC QL NAA+PROBE: NEGATIVE
GLUCOSE UR STRIP.AUTO-MCNC: NORMAL MG/DL
KETONES UR STRIP.AUTO-MCNC: NORMAL MG/DL
LEUKOCYTE ESTERASE UR QL STRIP.AUTO: NORMAL
NITRITE UR QL STRIP.AUTO: NEGATIVE
PH UR STRIP.AUTO: 5.5 [PH] (ref 5–8)
POCT INT CON NEG: NEGATIVE
POCT INT CON POS: POSITIVE
POCT URINE PREGNANCY TEST: POSITIVE
PROT UR QL STRIP: 30 MG/DL
RBC UR QL AUTO: NORMAL
RSV RNA SPEC QL NAA+PROBE: NEGATIVE
SARS-COV-2 RNA RESP QL NAA+PROBE: NEGATIVE
SP GR UR STRIP.AUTO: 1.02
UROBILINOGEN UR STRIP-MCNC: 0.2 MG/DL

## 2025-03-25 PROCEDURE — 81025 URINE PREGNANCY TEST: CPT | Performed by: NURSE PRACTITIONER

## 2025-03-25 PROCEDURE — 87086 URINE CULTURE/COLONY COUNT: CPT

## 2025-03-25 PROCEDURE — 3074F SYST BP LT 130 MM HG: CPT | Performed by: NURSE PRACTITIONER

## 2025-03-25 PROCEDURE — 81002 URINALYSIS NONAUTO W/O SCOPE: CPT | Performed by: NURSE PRACTITIONER

## 2025-03-25 PROCEDURE — 87637 SARSCOV2&INF A&B&RSV AMP PRB: CPT | Mod: QW | Performed by: NURSE PRACTITIONER

## 2025-03-25 PROCEDURE — 3078F DIAST BP <80 MM HG: CPT | Performed by: NURSE PRACTITIONER

## 2025-03-25 PROCEDURE — 99214 OFFICE O/P EST MOD 30 MIN: CPT | Mod: 25 | Performed by: NURSE PRACTITIONER

## 2025-03-25 PROCEDURE — 76817 TRANSVAGINAL US OBSTETRIC: CPT

## 2025-03-25 RX ORDER — CEPHALEXIN 500 MG/1
500 CAPSULE ORAL 3 TIMES DAILY
Qty: 21 CAPSULE | Refills: 0 | Status: SHIPPED | OUTPATIENT
Start: 2025-03-25 | End: 2025-04-01

## 2025-03-25 ASSESSMENT — ENCOUNTER SYMPTOMS: DIZZINESS: 1

## 2025-03-25 ASSESSMENT — FIBROSIS 4 INDEX: FIB4 SCORE: 0.44

## 2025-03-25 NOTE — PROGRESS NOTES
Subjective:     Niki Riley is a 24 y.o. female who presents for Dizziness (Positive preg. test, note for work. Dizzy spells, cramping and aches all over body for prenatal appt on 4/11 )      Dizziness    Pt presents for evaluation of a new problem. Niki is a pleasant 24-year-old female who presents to urgent care today with complaints of dizziness, body aches, chills and pelvic pain.  She notes that she is approximately 5 weeks pregnant.  She has not yet had her first OB check and states that this is scheduled for/11/2025.  Her symptoms are progressively worsening.  She denies any vaginal bleeding however, she has been having pelvic cramping.  She has not use any medication for her symptoms.  She is taking prenatal vitamins.  Negative for dysuria, urgency or frequency.  There has been no cough, sore throat or congestion.    Review of Systems   Neurological:  Positive for dizziness.       PMH:   Past Medical History:   Diagnosis Date   • Asthma    • Migraine    • Shoulder pain 8/8/2014   • Urinary tract infection      ALLERGIES:   Allergies   Allergen Reactions   • Sulfa Drugs    • Honey Dew    • Amoxicillin-Pot Clavulanate Hives, Palpitations and Unspecified     Dizziness, nausea, hives    • Kiwi Extract Anaphylaxis     Clarified by dietary staff   • Lime Food Allergy Hives     Rash    • Other Misc Swelling     Hair dye      SURGHX:   Past Surgical History:   Procedure Laterality Date   • PELVISCOPY  9/5/2018    Procedure: PELVISCOPY-  OVARIAN CYSTECTOMY;  Surgeon: Kathleen Chen M.D.;  Location: SURGERY SAME DAY [facility];  Service: Gynecology   • GASTROSCOPY  5/24/2014    Performed by Luis Felipe Decker M.D. at SURGERY [facility]     SOCHX:   Social History     Socioeconomic History   • Marital status: Other   Tobacco Use   • Smoking status: Never   • Smokeless tobacco: Never   • Tobacco comments:     vapes   Vaping Use   • Vaping status: Every Day   • Substances: Flavoring   • Devices:  "Refillable tank   Substance and Sexual Activity   • Alcohol use: Yes     Comment: occ   • Drug use: No   • Sexual activity: Yes     Partners: Male     Birth control/protection: None     Social Drivers of Health     Intimate Partner Violence: Low Risk  (2/25/2024)    [facility]    History of Abuse    • Have you ever been afraid of, threatened, neglected, or abused by someone?: No     FH:   Family History   Problem Relation Age of Onset   • Anxiety disorder Mother    • Heart Disease Maternal Grandfather          Objective:   /70   Pulse 99   Temp 36.6 °C (97.8 °F) (Temporal)   Resp 18   Ht 1.676 m (5' 6\")   Wt 67.6 kg (149 lb)   LMP 11/15/2024   SpO2 99%   BMI 24.05 kg/m²     Physical Exam  Vitals and nursing note reviewed.   Constitutional:       General: She is not in acute distress.     Appearance: Normal appearance. She is ill-appearing.   HENT:      Head: Normocephalic and atraumatic.      Right Ear: External ear normal.      Left Ear: External ear normal.      Nose: No congestion or rhinorrhea.      Mouth/Throat:      Mouth: Mucous membranes are moist.   Eyes:      Extraocular Movements: Extraocular movements intact.      Pupils: Pupils are equal, round, and reactive to light.   Cardiovascular:      Rate and Rhythm: Normal rate and regular rhythm.      Pulses: Normal pulses.      Heart sounds: Normal heart sounds.   Pulmonary:      Effort: Pulmonary effort is normal.      Breath sounds: Normal breath sounds.   Abdominal:      General: Abdomen is flat. Bowel sounds are normal.      Palpations: Abdomen is soft.      Tenderness: There is abdominal tenderness in the suprapubic area and left lower quadrant. There is rebound. There is no right CVA tenderness or left CVA tenderness.   Musculoskeletal:         General: Normal range of motion.      Cervical back: Normal range of motion and neck supple.   Skin:     General: " Skin is warm and dry.      Capillary Refill: Capillary refill takes less than 2 seconds.   Neurological:      General: No focal deficit present.      Mental Status: She is alert and oriented to person, place, and time. Mental status is at baseline.   Psychiatric:         Mood and Affect: Mood normal.         Behavior: Behavior normal.         Thought Content: Thought content normal.         Judgment: Judgment normal.     Results for orders placed or performed in visit on 03/25/25   POCT Pregnancy    Collection Time: 03/25/25 10:50 AM   Result Value Ref Range    POC Urine Pregnancy Test Positive     Internal Control Positive Positive     Internal Control Negative Negative    POCT Urinalysis    Collection Time: 03/25/25 10:51 AM   Result Value Ref Range    POC Color yellow Negative    POC Appearance clear Negative    POC Glucose neg Negative mg/dL    POC Bilirubin neg Negative mg/dL    POC Ketones neg Negative mg/dL    POC Specific Gravity 1.025 <1.005 - >1.030    POC Blood large Negative    POC Urine PH 5.5 5.0 - 8.0    POC Protein 30 Negative mg/dL    POC Urobiligen 0.2 Negative (0.2) mg/dL    POC Nitrites negative Negative    POC Leukocyte Esterase small Negative     *Note: Due to a large number of results and/or encounters for the requested time period, some results have not been displayed. A complete set of results can be found in Results Review.       Assessment/Plan:   Assessment      1. Pelvic pain  POCT Urinalysis    US-OB 1ST TRIMESTER WITH TRANSVAGINAL (COMBO)    POCT Pregnancy    URINE CULTURE(NEW)    cephALEXin (KEFLEX) 500 MG Cap    CBC WITH DIFFERENTIAL    Comp Metabolic Panel      2. Dizziness  POCT CoV-2, Flu A/B, RSV by PCR    POCT Urinalysis    US-OB 1ST TRIMESTER WITH TRANSVAGINAL (COMBO)    POCT Pregnancy    cephALEXin (KEFLEX) 500 MG Cap    CBC WITH DIFFERENTIAL    Comp Metabolic Panel      3. Flu-like symptoms  POCT CoV-2, Flu A/B, RSV by PCR    POCT Urinalysis    US-OB 1ST TRIMESTER WITH  TRANSVAGINAL (COMBO)    POCT Pregnancy    cephALEXin (KEFLEX) 500 MG Cap    CBC WITH DIFFERENTIAL    Comp Metabolic Panel      4. Abnormal urinalysis  URINE CULTURE(NEW)    cephALEXin (KEFLEX) 500 MG Cap    CBC WITH DIFFERENTIAL    Comp Metabolic Panel      5. Acute cystitis with hematuria  cephALEXin (KEFLEX) 500 MG Cap    CBC WITH DIFFERENTIAL    Comp Metabolic Panel      6. Less than 8 weeks gestation of pregnancy          Urine culture sent for evaluation of abnormal urinalysis.  Symptoms are consistent with UTI however, due to new pregnancy and concern for ectopic pregnancy stat ultrasound was ordered.  CBC, CMP also ordered for further evaluation.  Antibiotic sent to preferred pharmacy.  I will notify her of ultrasound report.  ER precautions given.

## 2025-03-25 NOTE — LETTER
March 25, 2025    To Whom It May Concern:         This is confirmation that Niki Riley attended her scheduled appointment with PILY Rene on 3/25/25. Please excuse her absence due to an acute illness. She may return on 3/29/2025 or sooner if better.          If you have any questions please do not hesitate to call me at the phone number listed below.    Sincerely,          ZEHRA ReneRDionneN.  [phone]

## 2025-03-27 LAB
BACTERIA UR CULT: NORMAL
SIGNIFICANT IND 70042: NORMAL
SITE SITE: NORMAL
SOURCE SOURCE: NORMAL

## 2025-04-04 ENCOUNTER — OFFICE VISIT (OUTPATIENT)
Dept: URGENT CARE | Facility: PHYSICIAN GROUP | Age: 25
End: 2025-04-04
Payer: MEDICAID

## 2025-04-04 VITALS
HEART RATE: 100 BPM | HEIGHT: 66 IN | TEMPERATURE: 97.8 F | SYSTOLIC BLOOD PRESSURE: 118 MMHG | DIASTOLIC BLOOD PRESSURE: 78 MMHG | WEIGHT: 153 LBS | RESPIRATION RATE: 16 BRPM | BODY MASS INDEX: 24.59 KG/M2 | OXYGEN SATURATION: 98 %

## 2025-04-04 DIAGNOSIS — O21.9 NAUSEA AND VOMITING DURING PREGNANCY: ICD-10-CM

## 2025-04-04 PROCEDURE — 99213 OFFICE O/P EST LOW 20 MIN: CPT | Performed by: NURSE PRACTITIONER

## 2025-04-04 PROCEDURE — 3074F SYST BP LT 130 MM HG: CPT | Performed by: NURSE PRACTITIONER

## 2025-04-04 PROCEDURE — 3078F DIAST BP <80 MM HG: CPT | Performed by: NURSE PRACTITIONER

## 2025-04-04 RX ORDER — MELOXICAM 7.5 MG/1
1 TABLET ORAL
COMMUNITY

## 2025-04-04 RX ORDER — ALPRAZOLAM 0.25 MG
1 TABLET ORAL
COMMUNITY

## 2025-04-04 RX ORDER — NAPROXEN 500 MG/1
1 TABLET ORAL 2 TIMES DAILY WITH MEALS
COMMUNITY

## 2025-04-04 RX ORDER — NITROFURANTOIN 25; 75 MG/1; MG/1
1 CAPSULE ORAL 2 TIMES DAILY
COMMUNITY

## 2025-04-04 RX ORDER — KETOROLAC TROMETHAMINE 5 MG/ML
SOLUTION OPHTHALMIC
COMMUNITY

## 2025-04-04 RX ORDER — ONDANSETRON 4 MG/1
TABLET, ORALLY DISINTEGRATING ORAL
COMMUNITY

## 2025-04-04 RX ORDER — ONDANSETRON 8 MG/1
1 TABLET, ORALLY DISINTEGRATING ORAL EVERY 8 HOURS PRN
COMMUNITY

## 2025-04-04 RX ORDER — ATOMOXETINE 25 MG/1
1 CAPSULE ORAL
COMMUNITY

## 2025-04-04 RX ORDER — CIPROFLOXACIN HYDROCHLORIDE 3.5 MG/ML
SOLUTION/ DROPS TOPICAL
COMMUNITY

## 2025-04-04 RX ORDER — CYCLOBENZAPRINE HCL 10 MG
TABLET ORAL
COMMUNITY

## 2025-04-04 ASSESSMENT — ENCOUNTER SYMPTOMS
NAUSEA: 1
NUMBER OF EPISODES OF EMESIS TODAY: 1

## 2025-04-04 ASSESSMENT — FIBROSIS 4 INDEX: FIB4 SCORE: 0.44

## 2025-04-05 NOTE — PROGRESS NOTES
[employer]        Answered message from pt        Pt states Diclegis is too expensive      She does not want to continue on zofran      Advised that she can get both ingredients (doxylamine and B6) off the shelf      Pt was satisfied with this plan and will proceed.    I advised her to go to ED for intractable n/v.          She voiced understanding of POC

## 2025-04-05 NOTE — PROGRESS NOTES
Subjective:     Niki Riley is a 24 y.o. female who presents for Emesis (X 1 wk, pt states she can barely keep anything down) and Nausea      Emesis  Associated symptoms include nausea.   Nausea  Associated symptoms include nausea.     Pt presents for evaluation of a new problem. Niki is a very pleasant 24-year-old female who presents to urgent care today with complaints of ongoing emesis and dizziness.  She is in her first trimester pregnancy and does have her first OB appointment next week.  She notes that she had similar nausea and vomiting throughout her first pregnancy in 2015.  She states that she has been unable to keep any food or liquids down in the last 2 days.  She denies any abdominal pain but does note mild occasional cramping.  Negative for bleeding.  She has had no fevers, chills or diarrhea.  She was prescribed Zofran at her last visit which she has not used as she is concerned for birth defects.     Review of Systems   Gastrointestinal:  Positive for nausea.       PMH:   Past Medical History:   Diagnosis Date    Asthma     Migraine     Shoulder pain 8/8/2014    Urinary tract infection      ALLERGIES:   Allergies   Allergen Reactions    Sulfa Drugs     Honey Dew     Amoxicillin-Pot Clavulanate Hives, Palpitations and Unspecified     Dizziness, nausea, hives     Kiwi Extract Anaphylaxis     Clarified by dietary staff    Lime Food Allergy Hives     Rash     Other Misc Swelling     Hair dye      SURGHX:   Past Surgical History:   Procedure Laterality Date    PELVISCOPY  9/5/2018    Procedure: PELVISCOPY-  OVARIAN CYSTECTOMY;  Surgeon: Kathleen Chen M.D.;  Location: SURGERY SAME DAY [facility];  Service: Gynecology    GASTROSCOPY  5/24/2014    Performed by Luis Felipe Decker M.D. at SURGERY [facility]     SOCHX:   Social History     Socioeconomic History    Marital status: Other   Tobacco Use    Smoking status: Never    Smokeless tobacco: Never    Tobacco comments:     vapes  "  Vaping Use    Vaping status: Every Day    Substances: Flavoring    Devices: Refillable tank   Substance and Sexual Activity    Alcohol use: Yes     Comment: occ    Drug use: No    Sexual activity: Yes     Partners: Male     Birth control/protection: None     Social Drivers of Health     Intimate Partner Violence: Low Risk  (2/25/2024)    [facility]    History of Abuse     Have you ever been afraid of, threatened, neglected, or abused by someone?: No     FH:   Family History   Problem Relation Age of Onset    Anxiety disorder Mother     Heart Disease Maternal Grandfather          Objective:   /78   Pulse 100   Temp 36.6 °C (97.8 °F) (Temporal)   Resp 16   Ht 1.676 m (5' 6\")   Wt 69.4 kg (153 lb)   LMP 11/15/2024   SpO2 98%   BMI 24.69 kg/m²     Physical Exam  Vitals and nursing note reviewed.   Constitutional:       General: She is not in acute distress.     Appearance: Normal appearance. She is normal weight. She is ill-appearing.   HENT:      Head: Normocephalic and atraumatic.      Right Ear: External ear normal.      Left Ear: External ear normal.      Nose: No congestion or rhinorrhea.      Mouth/Throat:      Mouth: Mucous membranes are moist.   Eyes:      Extraocular Movements: Extraocular movements intact.      Pupils: Pupils are equal, round, and reactive to light.   Cardiovascular:      Rate and Rhythm: Normal rate and regular rhythm.      Pulses: Normal pulses.      Heart sounds: Normal heart sounds.   Pulmonary:      Effort: Pulmonary effort is normal.      Breath sounds: Normal breath sounds.   Abdominal:      General: Abdomen is flat. Bowel sounds are normal.      Palpations: Abdomen is soft.      Tenderness: There is no abdominal tenderness. There is no right CVA tenderness or left CVA tenderness.   Musculoskeletal:         General: Normal range of motion.      Cervical back: Normal range of motion and " neck supple.   Skin:     General: Skin is warm and dry.      Capillary Refill: Capillary refill takes less than 2 seconds.   Neurological:      General: No focal deficit present.      Mental Status: She is alert and oriented to person, place, and time. Mental status is at baseline.   Psychiatric:         Mood and Affect: Mood normal.         Behavior: Behavior normal.         Thought Content: Thought content normal.         Judgment: Judgment normal.         Assessment/Plan:   Assessment    1. Nausea and vomiting during pregnancy  Doxylamine-Pyridoxine ER 20-20 MG Tab CR        Patient started on Diclegis and vitamin B6 for relief of nausea and vomiting.  We did discuss pregnancy category of Zofran and this is still relatively safe for use if symptoms of nausea and vomiting persist.  We did discuss importance of remaining hydrated with electrolyte replacement.  Patient to return for to urgent care or ER for worsening symptoms.  She is in agreement with plan of care.

## 2025-04-11 ENCOUNTER — HOSPITAL ENCOUNTER (OUTPATIENT)
Facility: MEDICAL CENTER | Age: 25
End: 2025-04-11
Payer: MEDICAID

## 2025-04-11 ENCOUNTER — APPOINTMENT (OUTPATIENT)
Dept: OBGYN | Facility: CLINIC | Age: 25
End: 2025-04-11
Payer: MEDICAID

## 2025-04-11 ENCOUNTER — INITIAL PRENATAL (OUTPATIENT)
Dept: OBGYN | Facility: CLINIC | Age: 25
End: 2025-04-11
Payer: MEDICAID

## 2025-04-11 VITALS — BODY MASS INDEX: 23.24 KG/M2 | DIASTOLIC BLOOD PRESSURE: 64 MMHG | SYSTOLIC BLOOD PRESSURE: 120 MMHG | WEIGHT: 144 LBS

## 2025-04-11 DIAGNOSIS — R00.2 PALPITATIONS: ICD-10-CM

## 2025-04-11 DIAGNOSIS — Z34.81 PRENATAL CARE, SUBSEQUENT PREGNANCY, FIRST TRIMESTER: ICD-10-CM

## 2025-04-11 PROBLEM — Z09 HOSPITAL DISCHARGE FOLLOW-UP: Status: RESOLVED | Noted: 2019-10-01 | Resolved: 2025-04-11

## 2025-04-11 PROBLEM — Z97.5 IUD (INTRAUTERINE DEVICE) IN PLACE: Status: RESOLVED | Noted: 2023-05-24 | Resolved: 2025-04-11

## 2025-04-11 PROBLEM — T83.9XXA IUD COMPLICATION (HCC): Status: RESOLVED | Noted: 2022-09-15 | Resolved: 2025-04-11

## 2025-04-11 PROCEDURE — 87591 N.GONORRHOEAE DNA AMP PROB: CPT

## 2025-04-11 PROCEDURE — 87491 CHLMYD TRACH DNA AMP PROBE: CPT

## 2025-04-11 PROCEDURE — 3078F DIAST BP <80 MM HG: CPT

## 2025-04-11 PROCEDURE — 3074F SYST BP LT 130 MM HG: CPT

## 2025-04-11 PROCEDURE — 0502F SUBSEQUENT PRENATAL CARE: CPT

## 2025-04-11 RX ORDER — ONDANSETRON 4 MG/1
4 TABLET, FILM COATED ORAL EVERY 4 HOURS PRN
Qty: 20 TABLET | Refills: 0 | Status: SHIPPED | OUTPATIENT
Start: 2025-04-11

## 2025-04-11 ASSESSMENT — EDINBURGH POSTNATAL DEPRESSION SCALE (EPDS)
I HAVE BEEN ABLE TO LAUGH AND SEE THE FUNNY SIDE OF THINGS: DEFINITELY NOT SO MUCH NOW
I HAVE BLAMED MYSELF UNNECESSARILY WHEN THINGS WENT WRONG: NOT VERY OFTEN
TOTAL SCORE: 15
THINGS HAVE BEEN GETTING ON TOP OF ME: NO, MOST OF THE TIME I HAVE COPED QUITE WELL
I HAVE FELT SCARED OR PANICKY FOR NO GOOD REASON: YES, SOMETIMES
I HAVE BEEN SO UNHAPPY THAT I HAVE BEEN CRYING: ONLY OCCASIONALLY
I HAVE LOOKED FORWARD WITH ENJOYMENT TO THINGS: DEFINITELY LESS THAN I USED TO
I HAVE FELT SAD OR MISERABLE: YES, QUITE OFTEN
THE THOUGHT OF HARMING MYSELF HAS OCCURRED TO ME: NEVER
I HAVE BEEN SO UNHAPPY THAT I HAVE HAD DIFFICULTY SLEEPING: YES, SOMETIMES
I HAVE BEEN ANXIOUS OR WORRIED FOR NO GOOD REASON: YES, SOMETIMES

## 2025-04-11 ASSESSMENT — FIBROSIS 4 INDEX: FIB4 SCORE: 0.44

## 2025-04-11 NOTE — PROGRESS NOTES
NOB today  LMP:02/13  Last pap: 2023 normal  Phone # [phone]   Pharmacy confirmed  On PNV  Genetic screening none  Vaginal symptoms: none  EPDS= 15  c/o nausea ,dizzy, fainting

## 2025-04-11 NOTE — LETTER
To Whom It May Concern:    Niki Riley was seen and treated in our department on 4/11/2025 and is approximately 8 weeks pregnant. Please excuse her from work for four weeks starting on 4/11/2025 for pregnancy-related concerns.  She may return to work sooner if symptoms resolve.     Sincerely,     Georgina Raya C.N.M.

## 2025-04-11 NOTE — LETTER
[facility]  [address] [address]     April 11, 2025    Patient: Niki Riley   YOB: 2000   Date of Visit: 4/11/2025       To Whom It May Concern:    Niki Riley was seen and treated in our department on 4/11/2025 and is approximately 8 weeks pregnant. Please excuse her from work for four weeks starting on 4/11/2025 for pregnancy-related concerns.  She may return to work sooner if symptoms resolve.     Sincerely,     Georgina Raya C.N.M.

## 2025-04-11 NOTE — LETTER
Niki Riley was seen and treated in our department on 4/11/2025 and is approximately 8 weeks pregnant. Please excuse her from work for four weeks starting on 4/11/2025 for pregnancy-related concerns.  She may return to work sooner if symptoms resolve.     Sincerely,     Georgina Raya C.N.M.
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
## 2025-04-16 ENCOUNTER — TELEPHONE (OUTPATIENT)
Dept: HEALTH INFORMATION MANAGEMENT | Facility: OTHER | Age: 25
End: 2025-04-16
Payer: MEDICAID

## 2025-04-23 ENCOUNTER — APPOINTMENT (OUTPATIENT)
Dept: CARDIOLOGY | Facility: MEDICAL CENTER | Age: 25
End: 2025-04-23
Payer: MEDICAID

## 2025-05-02 ENCOUNTER — HOSPITAL ENCOUNTER (OUTPATIENT)
Dept: LAB | Facility: MEDICAL CENTER | Age: 25
End: 2025-05-02
Payer: MEDICAID

## 2025-05-02 DIAGNOSIS — Z34.81 PRENATAL CARE, SUBSEQUENT PREGNANCY, FIRST TRIMESTER: ICD-10-CM

## 2025-05-02 LAB
ABO GROUP BLD: NORMAL
BLD GP AB SCN SERPL QL: NORMAL
RH BLD: NORMAL

## 2025-05-02 PROCEDURE — 86900 BLOOD TYPING SEROLOGIC ABO: CPT

## 2025-05-02 PROCEDURE — 36415 COLL VENOUS BLD VENIPUNCTURE: CPT

## 2025-05-02 PROCEDURE — 87340 HEPATITIS B SURFACE AG IA: CPT

## 2025-05-02 PROCEDURE — 86780 TREPONEMA PALLIDUM: CPT

## 2025-05-02 PROCEDURE — 87389 HIV-1 AG W/HIV-1&-2 AB AG IA: CPT

## 2025-05-02 PROCEDURE — 87086 URINE CULTURE/COLONY COUNT: CPT

## 2025-05-02 PROCEDURE — 85027 COMPLETE CBC AUTOMATED: CPT

## 2025-05-02 PROCEDURE — 86850 RBC ANTIBODY SCREEN: CPT

## 2025-05-02 PROCEDURE — 80307 DRUG TEST PRSMV CHEM ANLYZR: CPT

## 2025-05-02 PROCEDURE — 86803 HEPATITIS C AB TEST: CPT

## 2025-05-02 PROCEDURE — 86901 BLOOD TYPING SEROLOGIC RH(D): CPT

## 2025-05-02 PROCEDURE — 86762 RUBELLA ANTIBODY: CPT

## 2025-05-03 LAB
ERYTHROCYTE [DISTWIDTH] IN BLOOD BY AUTOMATED COUNT: 37.3 FL (ref 35.9–50)
HBV SURFACE AG SER QL: NORMAL
HCT VFR BLD AUTO: 38.5 % (ref 37–47)
HCV AB SER QL: NORMAL
HGB BLD-MCNC: 13 G/DL (ref 12–16)
HIV 1+2 AB+HIV1 P24 AG SERPL QL IA: NORMAL
MCH RBC QN AUTO: 29.1 PG (ref 27–33)
MCHC RBC AUTO-ENTMCNC: 33.8 G/DL (ref 32.2–35.5)
MCV RBC AUTO: 86.1 FL (ref 81.4–97.8)
PLATELET # BLD AUTO: 299 K/UL (ref 164–446)
PMV BLD AUTO: 10.2 FL (ref 9–12.9)
RBC # BLD AUTO: 4.47 M/UL (ref 4.2–5.4)
RUBV AB SER QL: 30.9 IU/ML
T PALLIDUM AB SER QL IA: NORMAL
WBC # BLD AUTO: 9.8 K/UL (ref 4.8–10.8)

## 2025-05-04 LAB
AMPHET CTO UR CFM-MCNC: NEGATIVE NG/ML
BARBITURATES CTO UR CFM-MCNC: NEGATIVE NG/ML
BENZODIAZ CTO UR CFM-MCNC: NEGATIVE NG/ML
CANNABINOIDS CTO UR CFM-MCNC: NEGATIVE NG/ML
COCAINE CTO UR CFM-MCNC: NEGATIVE NG/ML
CREAT UR-MCNC: 127.7 MG/DL (ref 20–400)
DRUG COMMENT 753798: NORMAL
METHADONE CTO UR CFM-MCNC: NEGATIVE NG/ML
OPIATES CTO UR CFM-MCNC: NEGATIVE NG/ML
PCP CTO UR CFM-MCNC: NEGATIVE NG/ML
PROPOXYPH CTO UR CFM-MCNC: NEGATIVE NG/ML

## 2025-05-05 LAB
BACTERIA UR CULT: NORMAL
SIGNIFICANT IND 70042: NORMAL
SITE SITE: NORMAL
SOURCE SOURCE: NORMAL

## 2025-05-06 ENCOUNTER — ROUTINE PRENATAL (OUTPATIENT)
Dept: OBGYN | Facility: CLINIC | Age: 25
End: 2025-05-06
Payer: MEDICAID

## 2025-05-06 VITALS — SYSTOLIC BLOOD PRESSURE: 102 MMHG | WEIGHT: 142 LBS | DIASTOLIC BLOOD PRESSURE: 66 MMHG | BODY MASS INDEX: 22.92 KG/M2

## 2025-05-06 DIAGNOSIS — Z34.81 PRENATAL CARE, SUBSEQUENT PREGNANCY, FIRST TRIMESTER: ICD-10-CM

## 2025-05-06 PROCEDURE — 3078F DIAST BP <80 MM HG: CPT | Performed by: PHYSICIAN ASSISTANT

## 2025-05-06 PROCEDURE — 0502F SUBSEQUENT PRENATAL CARE: CPT | Performed by: PHYSICIAN ASSISTANT

## 2025-05-06 PROCEDURE — 3074F SYST BP LT 130 MM HG: CPT | Performed by: PHYSICIAN ASSISTANT

## 2025-05-06 ASSESSMENT — FIBROSIS 4 INDEX: FIB4 SCORE: 0.44

## 2025-05-06 NOTE — PROGRESS NOTES
Pt. Here for OB/FU.   Pt. Denies VB, LOF, or UC's.   Chaperone offered   Pt states no concerns   Phone/Pharm verified.

## 2025-05-06 NOTE — PROGRESS NOTES
S: 24 y.o.  at 11w5d presents for routine obstetric follow-up.   No fetal movement.  No contractions, vaginal bleeding, or leakage of fluid.    Questions answered.    O: /66   Wt 142 lb   LMP 2025   BMI 22.92 kg/m²   Patients' weight gain, fluid intake and exercise level discussed.  Vitals, fundal height , fetal position, and FHR reviewed on flowsheet      A/P:  24 y.o.  at 11w5d presents for routine obstetric follow-up.  Size equals dates and/or scan    -Referral to cardiology for palpitations, has appt in 2025  - Unable to see CRL or date pregnancy at last visit. Pt thought she would be getting another US today. However, was only scheduled for routine OB. STAT US ordered for dating.   - NIPT ordered   -Reviewed PNL WNL   - Continue prenatal vitamins- pills not gummies.  - Exercise at least 30 minutes daily. Drink at least 3-4L of water daily      Follow-up in 4 weeks.    Jody Enciso P.A.-C.  [facility]

## 2025-05-07 ENCOUNTER — ANCILLARY PROCEDURE (OUTPATIENT)
Dept: OBGYN | Facility: CLINIC | Age: 25
End: 2025-05-07
Payer: MEDICAID

## 2025-05-07 DIAGNOSIS — N91.2 AMENORRHEA, UNSPECIFIED: ICD-10-CM

## 2025-05-07 DIAGNOSIS — Z34.81 PRENATAL CARE, SUBSEQUENT PREGNANCY, FIRST TRIMESTER: ICD-10-CM

## 2025-05-24 ENCOUNTER — OFFICE VISIT (OUTPATIENT)
Dept: URGENT CARE | Facility: PHYSICIAN GROUP | Age: 25
End: 2025-05-24
Payer: MEDICAID

## 2025-05-24 VITALS
OXYGEN SATURATION: 98 % | SYSTOLIC BLOOD PRESSURE: 108 MMHG | RESPIRATION RATE: 18 BRPM | TEMPERATURE: 97.7 F | WEIGHT: 142.6 LBS | HEIGHT: 66 IN | DIASTOLIC BLOOD PRESSURE: 68 MMHG | HEART RATE: 94 BPM | BODY MASS INDEX: 22.92 KG/M2

## 2025-05-24 DIAGNOSIS — R42 DIZZY: ICD-10-CM

## 2025-05-24 DIAGNOSIS — Z3A.14 14 WEEKS GESTATION OF PREGNANCY: ICD-10-CM

## 2025-05-24 DIAGNOSIS — N30.01 ACUTE CYSTITIS WITH HEMATURIA: ICD-10-CM

## 2025-05-24 LAB
APPEARANCE UR: CLEAR
BILIRUB UR STRIP-MCNC: NORMAL MG/DL
COLOR UR AUTO: YELLOW
GLUCOSE BLD-MCNC: 86 MG/DL (ref 65–99)
GLUCOSE UR STRIP.AUTO-MCNC: NORMAL MG/DL
KETONES UR STRIP.AUTO-MCNC: 15 MG/DL
LEUKOCYTE ESTERASE UR QL STRIP.AUTO: NORMAL
NITRITE UR QL STRIP.AUTO: NORMAL
PH UR STRIP.AUTO: 7 [PH] (ref 5–8)
PROT UR QL STRIP: NORMAL MG/DL
RBC UR QL AUTO: NORMAL
SP GR UR STRIP.AUTO: 1.02
UROBILINOGEN UR STRIP-MCNC: 0.2 MG/DL

## 2025-05-24 PROCEDURE — 99214 OFFICE O/P EST MOD 30 MIN: CPT | Mod: 25 | Performed by: PHYSICIAN ASSISTANT

## 2025-05-24 PROCEDURE — 3078F DIAST BP <80 MM HG: CPT | Performed by: PHYSICIAN ASSISTANT

## 2025-05-24 PROCEDURE — 81002 URINALYSIS NONAUTO W/O SCOPE: CPT | Performed by: PHYSICIAN ASSISTANT

## 2025-05-24 PROCEDURE — 82962 GLUCOSE BLOOD TEST: CPT | Performed by: PHYSICIAN ASSISTANT

## 2025-05-24 PROCEDURE — 3074F SYST BP LT 130 MM HG: CPT | Performed by: PHYSICIAN ASSISTANT

## 2025-05-24 RX ORDER — NITROFURANTOIN 25; 75 MG/1; MG/1
100 CAPSULE ORAL EVERY 12 HOURS
Qty: 10 CAPSULE | Refills: 0 | Status: SHIPPED | OUTPATIENT
Start: 2025-05-24 | End: 2025-05-29

## 2025-05-24 ASSESSMENT — ENCOUNTER SYMPTOMS
NAUSEA: 1
FEVER: 0
VOMITING: 1
VERTIGO: 1
CHILLS: 0
FLANK PAIN: 0
DIZZINESS: 1
WEAKNESS: 1
DIARRHEA: 0
ABDOMINAL PAIN: 0

## 2025-05-24 ASSESSMENT — FIBROSIS 4 INDEX: FIB4 SCORE: 0.46

## 2025-05-24 NOTE — LETTER
May 24, 2025         Patient: Niki Riley   YOB: 2000   Date of Visit: 5/24/2025           To Whom it May Concern:    Niki Riley was seen in my clinic on 5/24/2025. Please excuse any absences from work this week due to acute illness.      If you have any questions or concerns, please don't hesitate to call.        Sincerely,           Shiv Roman P.A.-C.  Electronically Signed

## 2025-05-24 NOTE — PROGRESS NOTES
Subjective     Niki Riley is a very pleasant 25 y.o. female who presents with Shaking (Mostly started today) and Dizziness (Off and on a few days)            G6?P1  Patient 14 weeks pregnant.  Having decreased urine output and slight frequency.  No hematuria or dysuria.  She is felt fatigued and weak with slight lightheadedness.  She notes she has been very nauseous due to pregnancy and her fluid and caloric intake has been inadequate.  She denies fever, vomiting, abdominal pain, vaginal bleeding.    Dizziness  This is a new problem. The current episode started today. The problem occurs constantly. The problem has been gradually worsening. Associated symptoms include nausea, vertigo, vomiting and weakness. Pertinent negatives include no abdominal pain, chills, fever, rash, swollen glands or urinary symptoms. She has tried nothing for the symptoms. The treatment provided no relief.       PMH:  has a past medical history of Asthma, Hypoglycemia, Shoulder pain (08/08/2014), and Urinary tract infection.    She has no past medical history of Blood transfusion without reported diagnosis, Kidney disease, or Migraine.  MEDS: Current Medications[1]  ALLERGIES: Allergies[2]  SURGHX: Past Surgical History[3]  SOCHX:  reports that she has never smoked. She has never used smokeless tobacco. She reports current alcohol use. She reports that she does not use drugs.  FH: family history includes Anxiety disorder in her mother; Heart Disease in her maternal grandfather.      Review of Systems   Constitutional:  Negative for chills and fever.   Gastrointestinal:  Positive for nausea and vomiting. Negative for abdominal pain and diarrhea.   Genitourinary:  Positive for dysuria and frequency. Negative for flank pain, hematuria and urgency.   Skin:  Negative for rash.   Neurological:  Positive for dizziness, vertigo and weakness.       Medications, Allergies, and current problem list reviewed today in Epic      "      Objective     /68   Pulse 94   Temp 36.5 °C (97.7 °F) (Temporal)   Resp 18   Ht 1.676 m (5' 6\")   Wt 64.7 kg (142 lb 9.6 oz)   LMP 02/13/2025   SpO2 98%   BMI 23.02 kg/m²      Physical Exam  Vitals and nursing note reviewed.   Constitutional:       General: She is not in acute distress.     Appearance: Normal appearance. She is well-developed. She is not ill-appearing, toxic-appearing or diaphoretic.   HENT:      Head: Normocephalic and atraumatic.      Right Ear: Tympanic membrane, ear canal and external ear normal.      Left Ear: Tympanic membrane, ear canal and external ear normal.      Nose: Nose normal. No congestion or rhinorrhea.      Mouth/Throat:      Mouth: Mucous membranes are moist.      Pharynx: No oropharyngeal exudate or posterior oropharyngeal erythema.   Eyes:      General:         Right eye: No discharge.         Left eye: No discharge.      Conjunctiva/sclera: Conjunctivae normal.   Cardiovascular:      Rate and Rhythm: Normal rate and regular rhythm.      Pulses: Normal pulses.      Heart sounds: Normal heart sounds.   Pulmonary:      Effort: Pulmonary effort is normal. No respiratory distress.      Breath sounds: Normal breath sounds. No stridor. No wheezing, rhonchi or rales.   Abdominal:      General: Abdomen is flat. There is no distension.      Palpations: Abdomen is soft.      Tenderness: There is no abdominal tenderness. There is no right CVA tenderness, left CVA tenderness, guarding or rebound.   Musculoskeletal:      Cervical back: Normal range of motion and neck supple.      Right lower leg: No edema.      Left lower leg: No edema.   Lymphadenopathy:      Cervical: No cervical adenopathy.   Skin:     General: Skin is warm and dry.   Neurological:      General: No focal deficit present.      Mental Status: She is alert and oriented to person, place, and time. Mental status is at baseline.   Psychiatric:         Mood and Affect: Mood normal.         Behavior: Behavior " normal.         Thought Content: Thought content normal.         Judgment: Judgment normal.                                  Assessment & Plan  Dizzy  EKG: Normal sinus rhythm and rate.  No ST elevation or ischemia.    Orders:    POCT Urinalysis    POCT glucose    EKG - Clinic Performed    Acute cystitis with hematuria  This is a very pleasant 25-year-old female who is 14 weeks pregnant presenting with fatigue, weakness, lightheadedness.  Some decreased urine output and urgency.  She denies fever, hematuria, dysuria, vaginal bleeding, abdominal pain, shortness of breath.  She has been nauseous with vomiting associated with pregnancy therefore her oral intake has been inadequate.  Vital signs are stable.  Her abdominal exam is benign without tenderness rebound guarding or CVA tenderness.  Cardiopulmonary auscultation at baseline.  No lower leg swelling calf tenderness or JVD.  Remainder of exam reassuring.  EKG ordered which was normal and did provide reassurance to patient.  Urinalysis shows cloudy urine with leukocytes.  Her glucose was normal.  She will be treated for acute cystitis without signs of renal involvement.  I do feel her pregnancy associated nausea/vomiting has contributed to inadequate caloric intake which may be causing some weakness and lightheadedness.  I did encourage increased fluids and several small protein rich meals.  I did advise patient to have no access to further labs or imaging in the urgent care.  If there is no improvement in her symptoms in 12 to 24 hours she will need to be seen in the ER for further workup.      Orders:    Urine Culture; Future    nitrofurantoin (MACROBID) 100 MG Cap; Take 1 Capsule by mouth every 12 hours for 5 days.    14 weeks gestation of pregnancy              I personally reviewed prior external notes and test results pertinent to today's visit. Return to clinic or go to ED if symptoms worsen or persist. Red flag symptoms and indications for ED discussed at  length. Patient/Parent/Guardian voices understanding.  AVS with post-visit instructions printed and provided or given verbally.  Follow-up with your primary care provider in 3-5 days. All side effects and potential interactions of prescribed medication discussed including allergic response, GI upset, tendon injury, rash, sedation, OCP effectiveness, etc.    Please note that this dictation was created using voice recognition software. I have made every reasonable attempt to correct obvious errors, but I expect that there are errors of grammar and possibly content that I did not discover before finalizing the note.               [1]   Current Outpatient Medications:     nitrofurantoin (MACROBID) 100 MG Cap, Take 1 Capsule by mouth every 12 hours for 5 days., Disp: 10 Capsule, Rfl: 0    ondansetron (ZOFRAN) 4 MG Tab tablet, Take 1 Tablet by mouth every four hours as needed for Nausea/Vomiting., Disp: 20 Tablet, Rfl: 0    Prenatal Vit-Fe Fumarate-FA (PRENATAL/FOLIC ACID PO), Take  by mouth every day. 2 gummies a day, Disp: , Rfl:     albuterol 108 (90 Base) MCG/ACT Aero Soln inhalation aerosol, Inhale 2 Puffs every 6 hours as needed for Shortness of Breath., Disp: 18 g, Rfl: 2  [2]   Allergies  Allergen Reactions    Sulfa Drugs     Honey Dew     Amoxicillin-Pot Clavulanate Hives, Palpitations and Unspecified     Dizziness, nausea, hives     Kiwi Extract Anaphylaxis     Clarified by dietary staff    Lime Food Allergy Hives     Rash     Other Misc Swelling     Hair dye    [3]   Past Surgical History:  Procedure Laterality Date    PELVISCOPY  9/5/2018    Procedure: PELVISCOPY-  OVARIAN CYSTECTOMY;  Surgeon: Kathleen Chen M.D.;  Location: SURGERY SAME DAY [facility];  Service: Gynecology    GASTROSCOPY  5/24/2014    Performed by Luis Felipe Decker M.D. at SURGERY [facility]

## 2025-05-25 ASSESSMENT — ENCOUNTER SYMPTOMS: SWOLLEN GLANDS: 0

## 2025-05-25 NOTE — ASSESSMENT & PLAN NOTE
This is a very pleasant 25-year-old female who is 14 weeks pregnant presenting with fatigue, weakness, lightheadedness.  Some decreased urine output and urgency.  She denies fever, hematuria, dysuria, vaginal bleeding, abdominal pain, shortness of breath.  She has been nauseous with vomiting associated with pregnancy therefore her oral intake has been inadequate.  Vital signs are stable.  Her abdominal exam is benign without tenderness rebound guarding or CVA tenderness.  Cardiopulmonary auscultation at baseline.  No lower leg swelling calf tenderness or JVD.  Remainder of exam reassuring.  EKG ordered which was normal and did provide reassurance to patient.  Urinalysis shows cloudy urine with leukocytes.  Her glucose was normal.  She will be treated for acute cystitis without signs of renal involvement.  I do feel her pregnancy associated nausea/vomiting has contributed to inadequate caloric intake which may be causing some weakness and lightheadedness.  I did encourage increased fluids and several small protein rich meals.  I did advise patient to have no access to further labs or imaging in the urgent care.  If there is no improvement in her symptoms in 12 to 24 hours she will need to be seen in the ER for further workup.      Orders:    Urine Culture; Future    nitrofurantoin (MACROBID) 100 MG Cap; Take 1 Capsule by mouth every 12 hours for 5 days.

## 2025-05-28 ENCOUNTER — TELEPHONE (OUTPATIENT)
Dept: CARDIOLOGY | Facility: MEDICAL CENTER | Age: 25
End: 2025-05-28
Payer: MEDICAID

## 2025-05-28 NOTE — TELEPHONE ENCOUNTER
Chart Prep    Spoke to patient in regards to records for NP appointment with RONALD 06.04.2025.     Patient has seen a cardiologist before?  Yes   If yes, where?: In [facility]    Any recent cardiac testing outside of [facility]?  No       Were any records requested?  No

## 2025-06-04 ENCOUNTER — OFFICE VISIT (OUTPATIENT)
Dept: CARDIOLOGY | Facility: MEDICAL CENTER | Age: 25
End: 2025-06-04
Payer: MEDICAID

## 2025-06-04 VITALS
BODY MASS INDEX: 22.66 KG/M2 | OXYGEN SATURATION: 100 % | WEIGHT: 141 LBS | HEIGHT: 66 IN | SYSTOLIC BLOOD PRESSURE: 90 MMHG | RESPIRATION RATE: 16 BRPM | HEART RATE: 91 BPM | DIASTOLIC BLOOD PRESSURE: 46 MMHG

## 2025-06-04 DIAGNOSIS — I37.1 MILD PULMONARY VALVE REGURGITATION: ICD-10-CM

## 2025-06-04 DIAGNOSIS — R00.2 PALPITATIONS: Primary | ICD-10-CM

## 2025-06-04 DIAGNOSIS — I34.0 MILD MITRAL REGURGITATION: ICD-10-CM

## 2025-06-04 PROCEDURE — 99213 OFFICE O/P EST LOW 20 MIN: CPT | Performed by: INTERNAL MEDICINE

## 2025-06-04 PROCEDURE — 3078F DIAST BP <80 MM HG: CPT | Performed by: INTERNAL MEDICINE

## 2025-06-04 PROCEDURE — 99204 OFFICE O/P NEW MOD 45 MIN: CPT | Performed by: INTERNAL MEDICINE

## 2025-06-04 PROCEDURE — 3074F SYST BP LT 130 MM HG: CPT | Performed by: INTERNAL MEDICINE

## 2025-06-04 RX ORDER — LORATADINE 10 MG/1
10 TABLET ORAL PRN
COMMUNITY

## 2025-06-04 ASSESSMENT — FIBROSIS 4 INDEX: FIB4 SCORE: 0.46

## 2025-06-04 NOTE — PROGRESS NOTES
Cardiology Initial Consultation Note    Date of note:    6/4/2025    Primary Care Provider: Binta Ware P.A.-C.  Referring Provider: Georgina Raya C.N.M.    Patient Name: Niki Riley   YOB: 2000  MRN:              [id number]    Chief Complaint   Patient presents with    New Patient     Establish Care       History of Present Illness: Ms. Niki Riley is a 25-year-old woman with no significant past cardiac history except for mild mitral regurgitation and pulmonic insufficiency who presents to the cardiology office for evaluation of palpitations.    She is currently pregnant and has been experiencing worsening palpitations since her pregnancy started.  She was previously following with the [facility] cardiology group several years prior.  She was seeing them in the past due to symptoms of palpitations, chest pain and dyspnea.  She had cardiac workup including Holter monitor performed and she was found to have rare PACs.  In addition, she had multiple echocardiograms for structural evaluation.  Was noted to have normal LV function with EF of 60 to 65% and only mild mitral regurgitation and mild pulmonic insufficiency back in 2023.    She tells me that since her pregnancy, she has noticed worsening symptoms of palpitations.  She has been experiencing this for many years prior.  These palpitation events are sporadic in nature.  Can occur almost on a daily basis.  Will typically last for a few minutes before resolving on its own.  With palpitations she experiences lightheadedness, dizziness as well as dyspnea.  Given new pregnancy, she wanted to ensure that she is doing well from a cardiac standpoint.      Cardiovascular Risk Factors:  1. Smoking status: Denies  2. Type II Diabetes Mellitus: Denies   Lab Results   Component Value Date/Time    HBA1C 4.9 12/29/2023 09:01 AM    HBA1C 5.1 12/15/2017 10:05 AM     3. Hypertension: Denies  4. Dyslipidemia: Denies  Cholesterol,Tot   Date  Value Ref Range Status   12/15/2017 156 118 - 207 mg/dL Final     LDL   Date Value Ref Range Status   12/15/2017 96 <100 mg/dL Final     HDL   Date Value Ref Range Status   12/15/2017 54 >=40 mg/dL Final     Triglycerides   Date Value Ref Range Status   12/15/2017 32 (L) 36 - 126 mg/dL Final     5. Family history of early Coronary Artery Disease in a first degree relative (Male less than 55 years of age; Female less than 65 years of age): Denies      Review of Systems:  As per HPI.  Review of systems assessed and are negative except as stated above.      Past Medical History[1]      Past Surgical History[2]      Current Medications[3]      Allergies[4]      Family History   Problem Relation Age of Onset    Anxiety disorder Mother     Heart Disease Maternal Grandfather          Social History     Socioeconomic History    Marital status: Other     Spouse name: Not on file    Number of children: Not on file    Years of education: Not on file    Highest education level: Not on file   Occupational History    Not on file   Tobacco Use    Smoking status: Never    Smokeless tobacco: Never    Tobacco comments:     vapes   Vaping Use    Vaping status: Former    Substances: Flavoring    Devices: Refillable tank   Substance and Sexual Activity    Alcohol use: Not Currently     Comment: occ    Drug use: No    Sexual activity: Yes     Partners: Male     Birth control/protection: None   Other Topics Concern    Behavioral problems Not Asked    Interpersonal relationships Not Asked    Sad or not enjoying activities Not Asked    Suicidal thoughts Not Asked    Poor school performance Not Asked    Reading difficulties Not Asked    Speech difficulties Not Asked    Writing difficulties Not Asked    Inadequate sleep Not Asked    Excessive TV viewing Not Asked    Excessive video game use Not Asked    Inadequate exercise Not Asked    Sports related Not Asked    Poor diet Not Asked    Family concerns for drug/alcohol abuse Not Asked    Poor  "oral hygiene Not Asked    Bike safety Not Asked    Family concerns vehicle safety Not Asked   Social History Narrative    Not on file     Social Drivers of Health     Financial Resource Strain: Not on file   Food Insecurity: Not on file   Transportation Needs: Not on file   Physical Activity: Not on file   Stress: Not on file   Social Connections: Not on file   Intimate Partner Violence: Low Risk  (2/25/2024)    Received from [facility]    History of Abuse     Have you ever been afraid of, threatened, neglected, or abused by someone?: No   Housing Stability: Not on file         Physical Exam:  Ambulatory Vitals  BP 90/46 (BP Location: Left arm, Patient Position: Sitting, BP Cuff Size: Adult)   Pulse 91   Resp 16   Ht 1.676 m (5' 6\")   Wt 64 kg (141 lb)   SpO2 100%    Oxygen Therapy:  Pulse Oximetry: 100 %  BP Readings from Last 4 Encounters:   06/04/25 90/46   05/24/25 108/68   05/06/25 102/66   04/11/25 120/64       Weight/BMI: Body mass index is 22.76 kg/m².  Wt Readings from Last 4 Encounters:   06/04/25 64 kg (141 lb)   05/24/25 64.7 kg (142 lb 9.6 oz)   05/06/25 64.4 kg (142 lb)   04/11/25 65.3 kg (144 lb)         General: Not in acute distress, appears comfortable  HEENT: OP clear   Neck:  No carotid bruits, No JVD appreciated  CVS:  RRR, Normal S1, S2. No murmurs, rubs or gallops  Resp: Normal respiratory effort, lungs CTA bilaterally. No rales or rhonchi  Abdomen: Soft, non-distended  Neurological: Alert and oriented x3, moves all extremities, no focal neurologic deficits  Psychiatric: Appropriate affect  Extremities:   Extremities warm, 2+ bilateral radial pulses.  2+ bilateral dp pulses, no lower extremity edema bilaterally      Lab Data Review:  Lab Results   Component Value Date/Time    CHOLSTRLTOT 156 12/15/2017 10:05 AM    LDL 96 12/15/2017 10:05 AM    HDL 54 12/15/2017 10:05 AM    TRIGLYCERIDE 32 (L) 12/15/2017 10:05 AM       Lab Results   Component Value Date/Time    SODIUM 137 " 12/16/2024 03:13 PM    POTASSIUM 3.7 12/16/2024 03:13 PM    CHLORIDE 106 12/16/2024 03:13 PM    CO2 22 12/16/2024 03:13 PM    GLUCOSE 108 (H) 12/16/2024 03:13 PM    BUN 8 12/16/2024 03:13 PM    CREATININE 0.77 12/16/2024 03:13 PM    GLOMRATE 97 02/27/2023 07:07 PM     Lab Results   Component Value Date/Time    ALKPHOSPHAT 49 12/16/2024 03:13 PM    ASTSGOT 19 12/16/2024 03:13 PM    ALTSGPT 12 12/16/2024 03:13 PM    TBILIRUBIN 0.3 12/16/2024 03:13 PM      Lab Results   Component Value Date/Time    WBC 9.8 05/02/2025 11:25 AM    HEMOGLOBIN 13.0 05/02/2025 11:25 AM     Lab Results   Component Value Date/Time    HBA1C 4.9 12/29/2023 09:01 AM    HBA1C 5.1 12/15/2017 10:05 AM         Cardiac Imaging and Procedures Review:    EKG dated 5/24/2025:   My personal interpretation is sinus rhythm, normal EKG    Echo dated 09/12/2023 ([facility]):   1. Normal LV systolic function. Ejection fraction is 60-65%. Diastolic function is normal.   2. Normal RV size and function.   3. No hemodynamically significant valvular abnormalities.   4. No pericardial effusion.   5. Compared to prior study in 02/2020, RV function appears normal.       Cardiac Monitor:  Procedure: BioTel for 6 days beginning 10/7/2023   DOS: 11/7/2023     Findings:   Underlying rhythm: Sinus   The average heart rate is 77 BPM, and ranges from  BPM     Atrial events: Rare. No atrial fibrillation     Ventricular events: Rare. No ventricular tachycardia     Pauses, bradyarrhythmia or heart block: None     Patient events: 6 events were submitted for review.  Events correspond to sinus rhythm between 82 and 146 bpm     Impressions:   Symptomatic 1 week monitor, symptoms correspond to sinus rhythm between 82 and 146 bpm   No significant arrhythmia         Assessment & Plan     1. Palpitations  Cardiac Event Monitor      2. Mild mitral regurgitation        3. Mild pulmonary valve regurgitation              Medical Decision Making:  Ms. Niki Riley is a 25-year-old woman  with no significant past cardiac history except for mild mitral regurgitation and pulmonic insufficiency who presents to the cardiology office for evaluation of palpitations.    1. Palpitations (Primary)  - She has a known history of rare PACs based on prior cardiac monitoring.  Was previously under the care of the [facility] cardiology group.  We reviewed possible causes of increase in palpitations especially during pregnancy.  Discussed that certainly given pregnancy there can be hormonal changes that may increase likelihood of palpitations.  Her EKG that was performed recently shows sinus rhythm and no evidence of ectopy or arrhythmias.  At this point in time I recommend checking a 2-week Zio patch to rule out any arrhythmias.  In addition we will assess for burden of ectopy.  Given pregnancy, I do recommend conservative therapy unless there are other concerning findings on the cardiac monitor.  Her prior echocardiogram especially in the last 1 performed in 2023 shows normal LV function and no major structural abnormalities.  She does have mild mitral regurgitation and pulmonary regurgitation.    2. Mild mitral regurgitation  3. Mild pulmonary valve regurgitation  - Findings of mild valvular regurgitation involving mitral and pulmonic valve.  We discussed the pathophysiology and natural history of valve regurgitation.  Explained that not unusual to see mild leakiness involving these valves.  She is currently stable without evidence of volume overload or heart failure.  Does not require any diuretics.  She will simply require repeat echocardiogram in approximately 5 years to monitor for progression.  Otherwise no additional workup is needed.      It was a pleasure seeing Ms. Niki Riley in the office today. Return if symptoms worsen or fail to improve. Patient is aware to call the cardiology clinic with any questions or concerns.      Chip Kulkarni MD, [facility]  Cardiologist, [facility] and  [facility], [address]  [address] [address]  [place] [address]  Phone: [phone]  Fax: [phone]    Please note that this dictation was created using voice recognition software. I have made every reasonable attempt to correct obvious errors, but it is possible there are errors of grammar and possibly content that I did not discover before finalizing the note.         [1]   Past Medical History:  Diagnosis Date    Asthma     Hypoglycemia     Shoulder pain 08/08/2014    Urinary tract infection    [2]   Past Surgical History:  Procedure Laterality Date    PELVISCOPY  9/5/2018    Procedure: PELVISCOPY-  OVARIAN CYSTECTOMY;  Surgeon: Kathleen Chen M.D.;  Location: SURGERY SAME DAY [facility];  Service: Gynecology    GASTROSCOPY  5/24/2014    Performed by Luis Felipe Decker M.D. at SURGERY [facility]   [3]   Current Outpatient Medications   Medication Sig Dispense Refill    loratadine (CLARITIN) 10 MG Tab Take 10 mg by mouth as needed.      ondansetron (ZOFRAN) 4 MG Tab tablet Take 1 Tablet by mouth every four hours as needed for Nausea/Vomiting. 20 Tablet 0    Prenatal Vit-Fe Fumarate-FA (PRENATAL/FOLIC ACID PO) Take  by mouth every day. 2 gummies a day      albuterol 108 (90 Base) MCG/ACT Aero Soln inhalation aerosol Inhale 2 Puffs every 6 hours as needed for Shortness of Breath. 18 g 2     No current facility-administered medications for this visit.   [4]   Allergies  Allergen Reactions    Sulfa Drugs     Honey Dew     Amoxicillin-Pot Clavulanate Hives, Palpitations and Unspecified     Dizziness, nausea, hives     Kiwi Extract Anaphylaxis     Clarified by dietary staff    Lime Food Allergy Hives     Rash     Other Misc Swelling     Hair dye

## 2025-06-05 ENCOUNTER — NON-PROVIDER VISIT (OUTPATIENT)
Dept: CARDIOLOGY | Facility: MEDICAL CENTER | Age: 25
End: 2025-06-05
Attending: INTERNAL MEDICINE
Payer: MEDICAID

## 2025-06-05 DIAGNOSIS — R00.2 PALPITATIONS: ICD-10-CM

## 2025-06-05 PROCEDURE — 93246 EXT ECG>7D<15D RECORDING: CPT

## 2025-06-05 NOTE — PROGRESS NOTES
Patient enrolled in the 14 day o Holter monitoring program per Chip Kulkarni MD.  >Office hook-up, serial # [id number]  >Currently pending EOS.

## 2025-06-11 ENCOUNTER — ROUTINE PRENATAL (OUTPATIENT)
Dept: OBGYN | Facility: CLINIC | Age: 25
End: 2025-06-11
Payer: MEDICAID

## 2025-06-11 VITALS — DIASTOLIC BLOOD PRESSURE: 54 MMHG | SYSTOLIC BLOOD PRESSURE: 104 MMHG | WEIGHT: 145 LBS | BODY MASS INDEX: 23.4 KG/M2

## 2025-06-11 DIAGNOSIS — Z34.82 PRENATAL CARE, SUBSEQUENT PREGNANCY, SECOND TRIMESTER: Primary | ICD-10-CM

## 2025-06-11 PROCEDURE — 3074F SYST BP LT 130 MM HG: CPT

## 2025-06-11 PROCEDURE — 0502F SUBSEQUENT PRENATAL CARE: CPT

## 2025-06-11 PROCEDURE — 3078F DIAST BP <80 MM HG: CPT

## 2025-06-11 ASSESSMENT — FIBROSIS 4 INDEX: FIB4 SCORE: 0.46

## 2025-06-11 NOTE — ASSESSMENT & PLAN NOTE
Pt is a 25 y.o.  at 16w6d gestation here today for routine prenatal care.     Discuss 2nd trimester warning signs  Address concerns: dizziness - self care measures discussed, will let us know if ineffective. Warning signs given, discussed when to report to ER.   MSAFP ordered  NIPT ordered earlier this pregnancy, pt to completed with MSAFP  Anatomy scan scheduled.  FU with cardiology after Zio patch competed, will await further recommendations.    RTC 4 wks

## 2025-06-11 NOTE — PROGRESS NOTES
Pt here for a OB follow up   GA: 16w 6d    Pt states: wants to know if the Loratadine is safe, wants to discuss her weight. Pt states she has been getting dizzy   + fetal movement.  No VB, LOF, UC's.  Phone # [phone]  Pharmacy Verified.  Ultrasound : 07/16  Labs : done  NIPT : will do   AFP : not interested

## 2025-06-11 NOTE — PROGRESS NOTES
S: Pt is a 25 y.o.  at 16w6d gestation here today for routine prenatal care.     Concerns today include:  Has complaints of dizziness today which she reports has been a concern she has had for years but has been worse in pregnancy.   Was seen by cardiology on  for history of palpitations, mild mitral valve regurgitation and mild pulmonary valve regurgitation. Is currently wearing Zio patch to rule out arrhythmias.   Denies: vaginal bleeding, pelvic and abdominal pain, cramping, contractions, leaking of fluid, urinary and vaginal symptoms    Pt reports fetal movement as Present     O: /54   Wt 145 lb   LMP 2025   BMI 23.40 kg/m²    *see prenatal flowsheet*     Labs:     Prenatal labs: Completed and normal  GTT: not due    GBS: Not yet collected  Genetic testing: none  STI testing: negative    Ultrasound: none since last visit     A/P:     Problem List Items Addressed This Visit       Prenatal care, subsequent pregnancy, second trimester - Primary    Pt is a 25 y.o.  at 16w6d gestation here today for routine prenatal care.     Discuss 2nd trimester warning signs  Address concerns: dizziness - self care measures discussed, will let us know if ineffective. Warning signs given, discussed when to report to ER.   MSAFP ordered  NIPT ordered earlier this pregnancy, pt to completed with MSAFP  Anatomy scan scheduled.  FU with cardiology after Zio patch competed, will await further recommendations.    RTC 4 wks           Relevant Orders    AFP MATERNAL SERUM ALPHA-FETOPROTEIN

## 2025-06-13 ENCOUNTER — RESULTS FOLLOW-UP (OUTPATIENT)
Dept: OBGYN | Facility: CLINIC | Age: 25
End: 2025-06-13

## 2025-07-14 ENCOUNTER — TELEPHONE (OUTPATIENT)
Dept: CARDIOLOGY | Facility: MEDICAL CENTER | Age: 25
End: 2025-07-14
Payer: MEDICAID

## 2025-07-14 ENCOUNTER — RESULTS FOLLOW-UP (OUTPATIENT)
Dept: CARDIOLOGY | Facility: MEDICAL CENTER | Age: 25
End: 2025-07-14
Payer: MEDICAID

## 2025-07-15 NOTE — PROGRESS NOTES
S: Pt is a 25 y.o.  at 21w6d gestation here today for routine prenatal care.     Concerns today include:  Having back pain that resolves with stretching.  Results of Zio patch came back normal, no additional medications or interventions required per cardiology.     Denies: vaginal bleeding, pelvic and abdominal pain, cramping, contractions, leaking of fluid, urinary and vaginal symptoms and headaches, visual changes, epigastric pain, loss of urine or feces.     Pt reports fetal movement as Present     O: /50   Wt 143 lb   LMP 2025   BMI 23.08 kg/m²    *see prenatal flowsheet*     Labs:     Prenatal labs: Completed and normal  GTT: not due     GBS: Not yet collected  Genetic testing: none  STI testing: negative     Ultrasound: US-OB 2ND-3RD TRIMESTER TRANSVAGINAL (COMBO)  Narrative: Indication  ===========    Indication: Supervision of Normal Pregnancy  Indication:  Screening for Malformation  Comment: Pt has not completed NIPT Testing  Indication: Anatomy survey. Fetal anatomy survey  Method  =======    Transabdominal ultrasound examination. View: Sufficient  Pregnancy  ==========    Pedersen pregnancy. Number of fetuses: 1  Dating  =======    LMP on: 2025  GA by LMP 21 w + 6 d  ROMELIA by LMP: 2025  GA by prior assessment 21 w + 6 d  ROMELIA by prior assessment: 2025  Ultrasound examination on: 2025  GA by U/S based upon: AC, BPD, Femur, HC  GA by U/S 22 w + 0 d  ROMELIA by U/S: 2025  Assigned: based on the LMP, selected on 2025  Assigned GA 21 w + 6 d  Assigned ROMELIA: 2025  General Evaluation  ===================    Cardiac activity present.  bpm. Fetal movements: visualized. Presentation: cephalic  Placenta: posterior, previa  Umbilical cord: 3 vessel cord, normal insertion  Amniotic fluid: MVP 3.7 cm, normal amount  Fetal Biometry  ===============    Standard  BPD 51.8 mm 21w 5d 41% Hadlock  OFD 67.8 mm 22w 5d 79% Larry  .1 mm 21w 4d 26%  Hadlock  Cerebellum tr 22.6 mm  41% [place]burg  Nuchal fold 4.5 mm  .2 mm 21w 5d 38% Hadlock  Femur 39.3 mm 22w 5d 67% Hadlock  Humerus 36.2 mm 22w 4d 73% Larry  HC / AC 1.15   g 22w 0d 54% Hadlock  EFW (lb) 1 lb  EFW (oz) 1 oz  EFW by: Hadlock (BPD-HC-AC-FL)  Extended   5.2 mm  CM 7.4 mm  93% Nicolaides  Head / Face / Neck  Cephalic index 0.76  26% Nicolaides  Nasal bone: present  Extremities / Bony Struc  FL / BPD 0.76  FL / HC 0.20  FL / AC 0.24  Other Structures   bpm  Fetal Anatomy  ==============    Cranium: normal  Lateral ventricles: normal  Choroid plexus: normal  Midline falx: normal  Cavum septi pellucidi: normal  Cerebellum: normal  Cisterna magna: normal  Head / Neck  Head size: normal  Head shape: normal  Rt lateral ventricle: normal  Lt lateral ventricle: normal  Rt choroid plexus: normal  Lt choroid plexus: normal  Parenchyma: normal  [place]: normal  Thalami: normal  Third ventricle: normal  Fourth ventricle: normal  Cerebellar lobes: normal  Vermis: normal  Neck: normal  Lips: normal  Profile: normal  Nose: normal  Face  Coronal face: normal  Nasal bone: present  Tongue: normal  Maxilla: normal  Mandible: normal  Orbits: normal  Lens: normal  4-chamber view: normal  RVOT view: normal  LVOT view: normal  3-vessel view: normal  3-vessel-trachea view: normal  Heart / Thorax  Situs: situs solitus (normal)  Aortic arch view: normal  Bicaval view: normal  Ductal arch view: normal  SVC: normal  IVC: normal  Interventricular septum: normal  Great vessels: normal  Cardiac position: levocardia (normal)  Cardiac axis: normal  Cardiac size: normal (approx. 1/3 of thoracic area)  Cardiac rhythm: regular (normal)  Cardiac function: good contractility (normal)  Rt lung: normal  Lt lung: normal  Diaphragm: normal  Trachea: normal  Ribs: normal  Cord insertion: normal  Stomach: normal  Kidneys: normal  Bladder: normal  Genitals: normal  Abdomen  Abdom. wall: normal  Rt  kidney: normal  Lt kidney: Lt Mild Fetal UTD 4.0mm  Liver: normal  Gallbladder: normal  Spleen: normal  Small bowel: normal  Large bowel: normal  Rt renal artery: normal  Lt renal artery: normal  Cervical spine: normal  Thoracic spine: normal  Lumbar spine: normal  Sacral spine: normal  Arms: normal  Hands: normal  Legs: normal  Feet: normal  Rt arm: normal  Lt arm: normal  Rt hand: normal  Rt fingers: normal  Lt hand: normal  Lt fingers: normal  Rt leg: normal  Lt leg: normal  Rt foot: normal  Rt toes: normal  Lt foot: normal  Lt toes: normal  Position of hands: normal  Position of feet: normal  Position of joints: normal  Fetal sex: male  Wants to know fetal sex: no  Maternal Structures  ====================    Uterus / Cervix  Approach: Transabdominal  Cervical length 37.1 mm  Ovaries / Tubes / Adnexa  Rt ovary: Normal  Lt ovary: Normal  Recommendations  ================    Recommend repeat US in 6 weeks to assess fetal growth,LIZBETH,Placental location and fetal kidneys.  Recommend No sexual activity.  Recommend NIPT,if not done.  Impression: Impression  ===========    IUP C?W her date and edc of 25.  Lt Mild Fetal renal UTD 4.0 mm  Posterior Placenta Previa. No vaginal bleeding.  Fetal anatomy was WNL.       A/P:     Problem List Items Addressed This Visit       Mild intermittent asthma without complication - Primary    Relevant Medications    loratadine (CLARITIN) 10 MG Tab    Prenatal care, subsequent pregnancy, second trimester    Pt is a 25 y.o.  at 21w6d gestation here today for routine prenatal care.   Size equal to dates    Discuss 2nd trimester warning signs  Address concerns: back pain - recommended continued stretching, heat, tylenol.   Encouraged completing NIPT and MSAFP previously ordered earlier this pregnancy.  Anatomy scan reviewed. Findings: previa, hydronephrosis. Repeat US needed and ordered today. Previa precautions reviewed.   RTC 4 wks           Placenta previa in second trimester     Relevant Orders    US-OB LIMITED GROWTH FOLLOW UP    Abnormal fetal ultrasound    Relevant Orders    US-OB LIMITED GROWTH FOLLOW UP

## 2025-07-16 ENCOUNTER — ROUTINE PRENATAL (OUTPATIENT)
Dept: OBGYN | Facility: CLINIC | Age: 25
End: 2025-07-16
Payer: MEDICAID

## 2025-07-16 ENCOUNTER — ANCILLARY PROCEDURE (OUTPATIENT)
Dept: OBGYN | Facility: CLINIC | Age: 25
End: 2025-07-16
Payer: MEDICAID

## 2025-07-16 VITALS
HEIGHT: 66 IN | DIASTOLIC BLOOD PRESSURE: 59 MMHG | BODY MASS INDEX: 23.01 KG/M2 | HEART RATE: 101 BPM | SYSTOLIC BLOOD PRESSURE: 89 MMHG | WEIGHT: 143.2 LBS

## 2025-07-16 VITALS — BODY MASS INDEX: 23.08 KG/M2 | SYSTOLIC BLOOD PRESSURE: 100 MMHG | DIASTOLIC BLOOD PRESSURE: 50 MMHG | WEIGHT: 143 LBS

## 2025-07-16 DIAGNOSIS — O44.02 PLACENTA PREVIA IN SECOND TRIMESTER: ICD-10-CM

## 2025-07-16 DIAGNOSIS — Z34.82 PRENATAL CARE, SUBSEQUENT PREGNANCY, SECOND TRIMESTER: ICD-10-CM

## 2025-07-16 DIAGNOSIS — O28.3 ABNORMAL FETAL ULTRASOUND: ICD-10-CM

## 2025-07-16 DIAGNOSIS — J45.20 MILD INTERMITTENT ASTHMA WITHOUT COMPLICATION: Primary | ICD-10-CM

## 2025-07-16 DIAGNOSIS — Z34.81 PRENATAL CARE, SUBSEQUENT PREGNANCY, FIRST TRIMESTER: ICD-10-CM

## 2025-07-16 PROCEDURE — 3074F SYST BP LT 130 MM HG: CPT

## 2025-07-16 PROCEDURE — 0502F SUBSEQUENT PRENATAL CARE: CPT

## 2025-07-16 PROCEDURE — 3078F DIAST BP <80 MM HG: CPT

## 2025-07-16 PROCEDURE — 76817 TRANSVAGINAL US OBSTETRIC: CPT | Performed by: OBSTETRICS & GYNECOLOGY

## 2025-07-16 PROCEDURE — 76805 OB US >/= 14 WKS SNGL FETUS: CPT | Performed by: OBSTETRICS & GYNECOLOGY

## 2025-07-16 RX ORDER — ACETAMINOPHEN 120 MG/1
120 SUPPOSITORY RECTAL EVERY 4 HOURS PRN
COMMUNITY

## 2025-07-16 RX ORDER — LORATADINE 10 MG/1
10 TABLET ORAL DAILY
Qty: 30 TABLET | Refills: 3 | Status: SHIPPED | OUTPATIENT
Start: 2025-07-16

## 2025-07-16 ASSESSMENT — FIBROSIS 4 INDEX
FIB4 SCORE: 0.46
FIB4 SCORE: 0.46

## 2025-07-16 NOTE — ASSESSMENT & PLAN NOTE
Pt is a 25 y.o.  at 21w6d gestation here today for routine prenatal care.   Size equal to dates    Discuss 2nd trimester warning signs  Address concerns: back pain - recommended continued stretching, heat, tylenol.   Encouraged completing NIPT and MSAFP previously ordered earlier this pregnancy.  Anatomy scan reviewed. Findings: previa, hydronephrosis. Repeat US needed and ordered today. Previa precautions reviewed.   RTC 4 wks

## 2025-07-16 NOTE — PROGRESS NOTES
Pt here for a OB follow up   GA: 21w 6d   Pt states: Loratadine (claritin) refilled, intense lower back pain that makes leg go numb  + fetal movement.  No VB, LOF, UC's.  Phone # [phone]  Pharmacy Verified.  Ultrasound : 07/16  Labs : done  Genetic : will do

## 2025-07-17 ENCOUNTER — TELEPHONE (OUTPATIENT)
Dept: OBGYN | Facility: CLINIC | Age: 25
End: 2025-07-17
Payer: MEDICAID

## 2025-07-17 DIAGNOSIS — Z34.82 PRENATAL CARE, SUBSEQUENT PREGNANCY, SECOND TRIMESTER: Primary | ICD-10-CM

## 2025-07-17 NOTE — TELEPHONE ENCOUNTER
Pt called stating that she was seen by Josse MELENDEZ and forgot to mention that she would like to have Thyroid labs ordered. Pt states that both her father and sister have Thyroid issues, and it was recommended that she get checked since it can be genetic. Informed pt that I would consult with Josse and that once labs were ordered it would pop up in [facility] then she could complete them at any [facility] lab. Pt agreed to plan and had no further questions or concerns.

## 2025-07-18 PROBLEM — O44.00 PLACENTA PREVIA: Status: ACTIVE | Noted: 2025-07-18

## 2025-07-19 ENCOUNTER — HOSPITAL ENCOUNTER (OUTPATIENT)
Dept: LAB | Facility: MEDICAL CENTER | Age: 25
End: 2025-07-19
Payer: MEDICAID

## 2025-07-19 DIAGNOSIS — Z34.82 PRENATAL CARE, SUBSEQUENT PREGNANCY, SECOND TRIMESTER: ICD-10-CM

## 2025-07-19 PROCEDURE — 82105 ALPHA-FETOPROTEIN SERUM: CPT

## 2025-07-19 PROCEDURE — 36415 COLL VENOUS BLD VENIPUNCTURE: CPT

## 2025-07-22 LAB
# FETUSES US: NORMAL
AFP MOM SERPL: 1.36
AFP SERPL-MCNC: 120 NG/ML
AGE - REPORTED: 25.5 YR
CURRENT SMOKER: NO
FAMILY MEMBER DISEASES HX: NO
GA METHOD: NORMAL
GA: NORMAL WK
IDDM PATIENT QL: NO
INTEGRATED SCN PATIENT-IMP: NORMAL
SPECIMEN DRAWN SERPL: NORMAL

## 2025-07-28 DIAGNOSIS — Z34.81 PRENATAL CARE, SUBSEQUENT PREGNANCY, FIRST TRIMESTER: ICD-10-CM

## 2025-08-13 ENCOUNTER — HOSPITAL ENCOUNTER (EMERGENCY)
Facility: MEDICAL CENTER | Age: 25
End: 2025-08-13
Attending: STUDENT IN AN ORGANIZED HEALTH CARE EDUCATION/TRAINING PROGRAM | Admitting: STUDENT IN AN ORGANIZED HEALTH CARE EDUCATION/TRAINING PROGRAM
Payer: MEDICAID

## 2025-08-13 ENCOUNTER — TELEPHONE (OUTPATIENT)
Facility: MEDICAL CENTER | Age: 25
End: 2025-08-13
Payer: MEDICAID

## 2025-08-13 VITALS
DIASTOLIC BLOOD PRESSURE: 61 MMHG | HEIGHT: 66 IN | HEART RATE: 92 BPM | TEMPERATURE: 97.3 F | OXYGEN SATURATION: 98 % | BODY MASS INDEX: 23.78 KG/M2 | SYSTOLIC BLOOD PRESSURE: 104 MMHG | WEIGHT: 148 LBS

## 2025-08-13 PROCEDURE — 99282 EMERGENCY DEPT VISIT SF MDM: CPT

## 2025-08-13 PROCEDURE — 99284 EMERGENCY DEPT VISIT MOD MDM: CPT

## 2025-08-13 ASSESSMENT — FIBROSIS 4 INDEX: FIB4 SCORE: 0.46

## 2025-08-21 ENCOUNTER — APPOINTMENT (OUTPATIENT)
Dept: OBGYN | Facility: CLINIC | Age: 25
End: 2025-08-21
Payer: MEDICAID

## 2025-08-21 ENCOUNTER — HOSPITAL ENCOUNTER (OUTPATIENT)
Facility: MEDICAL CENTER | Age: 25
End: 2025-08-21
Payer: MEDICAID

## 2025-08-21 DIAGNOSIS — N76.0 ACUTE VAGINITIS: ICD-10-CM

## 2025-08-21 PROBLEM — R10.9 ABDOMINAL CRAMPING: Status: RESOLVED | Noted: 2024-06-27 | Resolved: 2025-08-21

## 2025-08-21 PROBLEM — R73.09 ABNORMAL GLUCOSE: Status: RESOLVED | Noted: 2023-12-20 | Resolved: 2025-08-21

## 2025-08-21 PROBLEM — N30.01 ACUTE CYSTITIS WITH HEMATURIA: Status: RESOLVED | Noted: 2025-01-27 | Resolved: 2025-08-21

## 2025-08-21 PROBLEM — R10.11 RUQ PAIN: Status: RESOLVED | Noted: 2024-10-17 | Resolved: 2025-08-21

## 2025-08-21 PROBLEM — R63.4 WEIGHT LOSS: Status: RESOLVED | Noted: 2022-03-21 | Resolved: 2025-08-21

## 2025-08-21 PROCEDURE — 87480 CANDIDA DNA DIR PROBE: CPT

## 2025-08-21 PROCEDURE — 87510 GARDNER VAG DNA DIR PROBE: CPT

## 2025-08-21 PROCEDURE — 87660 TRICHOMONAS VAGIN DIR PROBE: CPT

## 2025-08-21 ASSESSMENT — FIBROSIS 4 INDEX: FIB4 SCORE: 0.46

## 2025-08-22 LAB
CANDIDA DNA VAG QL PROBE+SIG AMP: NEGATIVE
G VAGINALIS DNA VAG QL PROBE+SIG AMP: POSITIVE
T VAGINALIS DNA VAG QL PROBE+SIG AMP: NEGATIVE

## 2025-08-23 ENCOUNTER — RESULTS FOLLOW-UP (OUTPATIENT)
Dept: OBGYN | Facility: MEDICAL CENTER | Age: 25
End: 2025-08-23
Payer: MEDICAID

## 2025-08-23 DIAGNOSIS — B96.89 BACTERIAL VAGINOSIS IN PREGNANCY: Primary | ICD-10-CM

## 2025-08-23 DIAGNOSIS — O23.599 BACTERIAL VAGINOSIS IN PREGNANCY: Primary | ICD-10-CM

## 2025-08-23 RX ORDER — METRONIDAZOLE 500 MG/1
500 TABLET ORAL 2 TIMES DAILY
Qty: 14 TABLET | Refills: 0 | Status: SHIPPED | OUTPATIENT
Start: 2025-08-23 | End: 2025-08-30

## 2025-08-28 ENCOUNTER — ANCILLARY PROCEDURE (OUTPATIENT)
Dept: OBGYN | Facility: CLINIC | Age: 25
End: 2025-08-28
Attending: OBSTETRICS & GYNECOLOGY
Payer: MEDICAID

## 2025-08-28 VITALS
DIASTOLIC BLOOD PRESSURE: 61 MMHG | HEART RATE: 95 BPM | WEIGHT: 151.8 LBS | SYSTOLIC BLOOD PRESSURE: 109 MMHG | BODY MASS INDEX: 24.4 KG/M2 | HEIGHT: 66 IN

## 2025-08-28 DIAGNOSIS — Z87.59 HISTORY OF PLACENTA PREVIA: ICD-10-CM

## 2025-08-28 ASSESSMENT — FIBROSIS 4 INDEX: FIB4 SCORE: 0.46

## (undated) DEVICE — TUBE E-T HI-LO CUFF 7.0MM (10EA/PK)

## (undated) DEVICE — SODIUM CHL IRRIGATION 0.9% 1000ML (12EA/CA)

## (undated) DEVICE — KIT  I.V. START (100EA/CA)

## (undated) DEVICE — CANISTER SUCTION RIGID RED 1500CC (40EA/CA)

## (undated) DEVICE — GLOVE SZ 6.5 BIOGEL PI MICRO - PF LF (50PR/BX)

## (undated) DEVICE — KIT ANESTHESIA W/CIRCUIT & 3/LT BAG W/FILTER (20EA/CA)

## (undated) DEVICE — GOWN SURGEONS LARGE - (32/CA)

## (undated) DEVICE — CANISTER SUCTION 3000ML MECHANICAL FILTER AUTO SHUTOFF MEDI-VAC NONSTERILE LF DISP  (40EA/CA)

## (undated) DEVICE — NEEDLE INSFL 120MM 14GA VRRS - (20/BX)

## (undated) DEVICE — ARMREST CRADLE FOAM - (2PR/PK 12PR/CA)

## (undated) DEVICE — LACTATED RINGERS INJ 1000 ML - (14EA/CA 60CA/PF)

## (undated) DEVICE — CATHETER IV 20 GA X 1-1/4 ---SURG.& SDS ONLY--- (50EA/BX)

## (undated) DEVICE — GLOVE BIOGEL SZ 7 SURGICAL PF LTX - (50PR/BX 4BX/CA)

## (undated) DEVICE — DRESSING TRANSPARENT FILM TEGADERM 2.375 X 2.75"  (100EA/BX)"

## (undated) DEVICE — SET LEADWIRE 5 LEAD BEDSIDE DISPOSABLE ECG (1SET OF 5/EA)

## (undated) DEVICE — GLOVE SZ 7.5 BIOGEL PI MICRO - PF LF (50PR/BX)

## (undated) DEVICE — CANNULA W/SEAL 5X100 Z-THRE - ADED KII (12/BX)

## (undated) DEVICE — TRAY SRGPRP PVP IOD WT PRP - (20/CA)

## (undated) DEVICE — DRAPE STRLE REG TOWEL 18X24 - (10/BX 4BX/CA)"

## (undated) DEVICE — HEAD HOLDER JUNIOR/ADULT

## (undated) DEVICE — GLOVE BIOGEL SZ 6.5 SURGICAL PF LTX (50PR/BX 4BX/CA)

## (undated) DEVICE — TUBING CLEARLINK DUO-VENT - C-FLO (48EA/CA)

## (undated) DEVICE — SUCTION INSTRUMENT YANKAUER BULBOUS TIP W/O VENT (50EA/CA)

## (undated) DEVICE — GOWN WARMING STANDARD FLEX - (30/CA)

## (undated) DEVICE — MANIFOLD NEPTUNE 1 PORT (20/PK)

## (undated) DEVICE — SUTURE 0 VICRYL PLUS CT-2 - 27 INCH (36/BX)

## (undated) DEVICE — GLOVE BIOGEL PI INDICATOR SZ 8.0 SURGICAL PF LF -(50/BX 4BX/CA)

## (undated) DEVICE — TROCAR Z THREAD 11 X 100 - BLADED (6/BX)

## (undated) DEVICE — SUTURE GENERAL

## (undated) DEVICE — ELECTRODE 850 FOAM ADHESIVE - HYDROGEL RADIOTRNSPRNT (50/PK)

## (undated) DEVICE — SENSOR SPO2 NEO LNCS ADHESIVE (20/BX) SEE USER NOTES

## (undated) DEVICE — SET EXTENSION WITH 2 PORTS (48EA/CA) ***PART #2C8610 IS A SUBSTITUTE*****

## (undated) DEVICE — SET SUCTION/IRRIGATION WITH DISPOSABLE TIP (6/CA )PART #0250-070-520 IS A SUB

## (undated) DEVICE — PROTECTOR ULNA NERVE - (36PR/CA)

## (undated) DEVICE — GOWN SURGEONS X-LARGE - DISP. (30/CA)

## (undated) DEVICE — TUBING SETDISPOS HIGH FLOW II - (10/BX)

## (undated) DEVICE — SUTURE 4-0 MONOCRYL PLUS PS-2 - 27 INCH (36/BX)

## (undated) DEVICE — TUBE CONNECTING SUCTION - CLEAR PLASTIC STERILE 72 IN (50EA/CA)

## (undated) DEVICE — PAD SANITARY 11IN MAXI IND WRAPPED  (12EA/PK 24PK/CA)

## (undated) DEVICE — PACK LAPAROSCOPY - (1/CA)

## (undated) DEVICE — CHLORAPREP 26 ML APPLICATOR - ORANGE TINT(25/CA)

## (undated) DEVICE — DRAPESURG STERI-DRAPE LONG - (10/BX 4BX/CA)

## (undated) DEVICE — MASK ANESTHESIA ADULT  - (100/CA)

## (undated) DEVICE — ADHESIVE DERMABOND HVD MINI (12EA/BX)

## (undated) DEVICE — TROCAR 5X100 BLADED Z-THREAD - KII (6/BX)

## (undated) DEVICE — GLOVE BIOGEL PI INDICATOR SZ 7.0 SURGICAL PF LF - (50/BX 4BX/CA)

## (undated) DEVICE — ELECTRODE DUAL RETURN W/ CORD - (50/PK)